# Patient Record
Sex: FEMALE | Race: WHITE | NOT HISPANIC OR LATINO
[De-identification: names, ages, dates, MRNs, and addresses within clinical notes are randomized per-mention and may not be internally consistent; named-entity substitution may affect disease eponyms.]

---

## 2017-01-17 ENCOUNTER — APPOINTMENT (OUTPATIENT)
Dept: GASTROENTEROLOGY | Facility: CLINIC | Age: 72
End: 2017-01-17

## 2017-01-17 VITALS
BODY MASS INDEX: 27.82 KG/M2 | RESPIRATION RATE: 16 BRPM | WEIGHT: 157 LBS | OXYGEN SATURATION: 95 % | SYSTOLIC BLOOD PRESSURE: 150 MMHG | TEMPERATURE: 98.5 F | HEIGHT: 63 IN | DIASTOLIC BLOOD PRESSURE: 68 MMHG | HEART RATE: 75 BPM

## 2017-03-12 ENCOUNTER — RESULT REVIEW (OUTPATIENT)
Age: 72
End: 2017-03-12

## 2017-03-13 ENCOUNTER — OUTPATIENT (OUTPATIENT)
Dept: OUTPATIENT SERVICES | Facility: HOSPITAL | Age: 72
LOS: 1 days | End: 2017-03-13
Payer: MEDICARE

## 2017-03-13 ENCOUNTER — APPOINTMENT (OUTPATIENT)
Dept: GASTROENTEROLOGY | Facility: HOSPITAL | Age: 72
End: 2017-03-13

## 2017-03-13 DIAGNOSIS — K21.9 GASTRO-ESOPHAGEAL REFLUX DISEASE WITHOUT ESOPHAGITIS: ICD-10-CM

## 2017-03-13 DIAGNOSIS — Z90.49 ACQUIRED ABSENCE OF OTHER SPECIFIED PARTS OF DIGESTIVE TRACT: Chronic | ICD-10-CM

## 2017-03-13 PROCEDURE — 43239 EGD BIOPSY SINGLE/MULTIPLE: CPT

## 2017-03-15 LAB — SURGICAL PATHOLOGY STUDY: SIGNIFICANT CHANGE UP

## 2017-03-16 ENCOUNTER — MESSAGE (OUTPATIENT)
Age: 72
End: 2017-03-16

## 2017-04-19 ENCOUNTER — APPOINTMENT (OUTPATIENT)
Dept: PULMONOLOGY | Facility: CLINIC | Age: 72
End: 2017-04-19

## 2017-04-19 VITALS
WEIGHT: 157 LBS | RESPIRATION RATE: 19 BRPM | SYSTOLIC BLOOD PRESSURE: 160 MMHG | DIASTOLIC BLOOD PRESSURE: 90 MMHG | HEIGHT: 63 IN | OXYGEN SATURATION: 97 % | HEART RATE: 100 BPM | BODY MASS INDEX: 27.82 KG/M2

## 2017-04-19 RX ORDER — ALBUTEROL SULFATE 90 UG/1
108 (90 BASE) AEROSOL, METERED RESPIRATORY (INHALATION)
Qty: 1 | Refills: 1 | Status: ACTIVE | COMMUNITY
Start: 2017-04-19 | End: 1900-01-01

## 2017-04-28 RX ORDER — AZELASTINE HYDROCHLORIDE 137 UG/1
0.1 SPRAY, METERED NASAL
Qty: 30 | Refills: 0 | Status: COMPLETED | COMMUNITY
Start: 2016-10-25

## 2017-04-28 RX ORDER — CELECOXIB 200 MG/1
200 CAPSULE ORAL
Qty: 30 | Refills: 0 | Status: COMPLETED | COMMUNITY
Start: 2017-01-03

## 2017-04-28 RX ORDER — TIZANIDINE 4 MG/1
4 TABLET ORAL
Qty: 30 | Refills: 0 | Status: COMPLETED | COMMUNITY
Start: 2017-01-03

## 2017-04-28 RX ORDER — TRAMADOL HYDROCHLORIDE 50 MG/1
50 TABLET, COATED ORAL
Qty: 30 | Refills: 0 | Status: COMPLETED | COMMUNITY
Start: 2016-11-23

## 2017-04-30 ENCOUNTER — FORM ENCOUNTER (OUTPATIENT)
Age: 72
End: 2017-04-30

## 2017-05-01 ENCOUNTER — LABORATORY RESULT (OUTPATIENT)
Age: 72
End: 2017-05-01

## 2017-05-01 ENCOUNTER — OUTPATIENT (OUTPATIENT)
Dept: OUTPATIENT SERVICES | Facility: HOSPITAL | Age: 72
LOS: 1 days | End: 2017-05-01
Payer: MEDICARE

## 2017-05-01 ENCOUNTER — APPOINTMENT (OUTPATIENT)
Dept: CT IMAGING | Facility: CLINIC | Age: 72
End: 2017-05-01

## 2017-05-01 DIAGNOSIS — R05 COUGH: ICD-10-CM

## 2017-05-01 DIAGNOSIS — R06.2 WHEEZING: ICD-10-CM

## 2017-05-01 DIAGNOSIS — Z90.49 ACQUIRED ABSENCE OF OTHER SPECIFIED PARTS OF DIGESTIVE TRACT: Chronic | ICD-10-CM

## 2017-05-01 PROCEDURE — 71250 CT THORAX DX C-: CPT

## 2017-05-02 ENCOUNTER — APPOINTMENT (OUTPATIENT)
Dept: PULMONOLOGY | Facility: CLINIC | Age: 72
End: 2017-05-02

## 2017-05-02 VITALS
SYSTOLIC BLOOD PRESSURE: 140 MMHG | DIASTOLIC BLOOD PRESSURE: 78 MMHG | HEART RATE: 107 BPM | BODY MASS INDEX: 27.46 KG/M2 | RESPIRATION RATE: 17 BRPM | OXYGEN SATURATION: 95 % | HEIGHT: 63 IN | WEIGHT: 155 LBS

## 2017-05-02 DIAGNOSIS — Z82.62 FAMILY HISTORY OF OSTEOPOROSIS: ICD-10-CM

## 2017-05-02 DIAGNOSIS — Z84.1 FAMILY HISTORY OF DISORDERS OF KIDNEY AND URETER: ICD-10-CM

## 2017-05-02 LAB
BASOPHILS # BLD AUTO: 0.02 K/UL
BASOPHILS NFR BLD AUTO: 0.3 %
EOSINOPHIL # BLD AUTO: 0.01 K/UL
EOSINOPHIL NFR BLD AUTO: 0.1 %
HCT VFR BLD CALC: 39.6 %
HGB BLD-MCNC: 13.8 G/DL
HIV1+2 AB SPEC QL IA.RAPID: NONREACTIVE
HIVABINT: NORMAL
IMM GRANULOCYTES NFR BLD AUTO: 0.3 %
LYMPHOCYTES # BLD AUTO: 0.88 K/UL
LYMPHOCYTES NFR BLD AUTO: 11.8 %
MAN DIFF?: NORMAL
MCHC RBC-ENTMCNC: 33.1 PG
MCHC RBC-ENTMCNC: 34.8 GM/DL
MCV RBC AUTO: 95 FL
MONOCYTES # BLD AUTO: 0.21 K/UL
MONOCYTES NFR BLD AUTO: 2.8 %
NEUTROPHILS # BLD AUTO: 6.33 K/UL
NEUTROPHILS NFR BLD AUTO: 84.7 %
NT-PROBNP SERPL-MCNC: 143 PG/ML
PLATELET # BLD AUTO: 274 K/UL
RBC # BLD: 4.17 M/UL
RBC # FLD: 15.1 %
WBC # FLD AUTO: 7.47 K/UL

## 2017-05-02 RX ORDER — LEVOFLOXACIN 500 MG/1
500 TABLET, FILM COATED ORAL DAILY
Qty: 10 | Refills: 0 | Status: DISCONTINUED | COMMUNITY
Start: 2017-04-19 | End: 2017-05-02

## 2017-05-03 LAB
B PERT IGG SER-ACNC: <0.95 INDEX
B PERT IGG SER-ACNC: <0.95 INDEX
B PERT IGM SER-ACNC: <1 INDEX
B PERT IGM SER-ACNC: <1 INDEX

## 2017-05-04 LAB
CHLAMYDIA PNEUMONIAE AB IGA: NORMAL TITER
CHLAMYDIA PNEUMONIAE AB IGG: NORMAL TITER
CHLAMYDIA PNEUMONIAE AB IGM: NORMAL TITER
M PNEUMO IGG SER IA-ACNC: POSITIVE
M PNEUMO IGG SER QL IA: 2.8 INDEX
M PNEUMO IGM SER QL IA: 1577 UNITS/ML
MYCOPLASMA AG SPEC QL: POSITIVE

## 2017-05-08 LAB
A FLAVUS AB FLD QL: NEGATIVE
A FUMIGATUS AB FLD QL: NEGATIVE
A NIGER AB FLD QL: NEGATIVE
ASPERGILLUS FLAVUS PRECIPITINS: NEGATIVE
ASPERGILLUS FUMIGATES PRECIPTINS: NEGATIVE
ASPERGILLUS NIGER PRECIPITINS: NEGATIVE

## 2017-05-18 ENCOUNTER — APPOINTMENT (OUTPATIENT)
Dept: THORACIC SURGERY | Facility: CLINIC | Age: 72
End: 2017-05-18

## 2017-05-30 ENCOUNTER — APPOINTMENT (OUTPATIENT)
Dept: PULMONOLOGY | Facility: CLINIC | Age: 72
End: 2017-05-30

## 2017-05-30 VITALS
BODY MASS INDEX: 28.35 KG/M2 | RESPIRATION RATE: 17 BRPM | DIASTOLIC BLOOD PRESSURE: 80 MMHG | WEIGHT: 160 LBS | HEART RATE: 79 BPM | HEIGHT: 63 IN | SYSTOLIC BLOOD PRESSURE: 120 MMHG | OXYGEN SATURATION: 96 %

## 2017-05-30 RX ORDER — BENZONATATE 200 MG/1
200 CAPSULE ORAL
Qty: 90 | Refills: 1 | Status: DISCONTINUED | COMMUNITY
Start: 2017-04-19 | End: 2017-05-30

## 2017-05-30 RX ORDER — BUDESONIDE 0.5 MG/2ML
0.5 INHALANT ORAL
Qty: 60 | Refills: 5 | Status: DISCONTINUED | COMMUNITY
Start: 2017-05-02 | End: 2017-05-30

## 2017-05-30 RX ORDER — HYDROCODONE BITARTRATE AND HOMATROPINE METHYLBROMIDE 5; 1.5 MG/5ML; MG/5ML
5-1.5 SYRUP ORAL
Qty: 240 | Refills: 0 | Status: DISCONTINUED | COMMUNITY
Start: 2017-04-19 | End: 2017-05-30

## 2017-05-30 RX ORDER — LEVALBUTEROL HYDROCHLORIDE 1.25 MG/3ML
1.25 SOLUTION RESPIRATORY (INHALATION)
Qty: 90 | Refills: 2 | Status: DISCONTINUED | COMMUNITY
Start: 2017-05-02 | End: 2017-05-30

## 2017-05-30 RX ORDER — PREDNISONE 10 MG/1
10 TABLET ORAL
Qty: 100 | Refills: 0 | Status: DISCONTINUED | COMMUNITY
Start: 2017-05-02 | End: 2017-05-30

## 2017-06-20 ENCOUNTER — APPOINTMENT (OUTPATIENT)
Dept: PULMONOLOGY | Facility: CLINIC | Age: 72
End: 2017-06-20

## 2017-06-20 VITALS
HEART RATE: 76 BPM | SYSTOLIC BLOOD PRESSURE: 130 MMHG | DIASTOLIC BLOOD PRESSURE: 80 MMHG | RESPIRATION RATE: 17 BRPM | OXYGEN SATURATION: 99 % | HEIGHT: 63 IN | BODY MASS INDEX: 28.35 KG/M2 | WEIGHT: 160 LBS

## 2017-07-11 ENCOUNTER — APPOINTMENT (OUTPATIENT)
Dept: PULMONOLOGY | Facility: CLINIC | Age: 72
End: 2017-07-11

## 2017-07-11 VITALS
RESPIRATION RATE: 16 BRPM | WEIGHT: 160 LBS | OXYGEN SATURATION: 95 % | HEIGHT: 63 IN | HEART RATE: 84 BPM | SYSTOLIC BLOOD PRESSURE: 135 MMHG | BODY MASS INDEX: 28.35 KG/M2 | DIASTOLIC BLOOD PRESSURE: 60 MMHG

## 2017-07-11 DIAGNOSIS — Z87.01 PERSONAL HISTORY OF PNEUMONIA (RECURRENT): ICD-10-CM

## 2017-07-11 RX ORDER — AMOXICILLIN AND CLAVULANATE POTASSIUM 875; 125 MG/1; MG/1
875-125 TABLET, COATED ORAL
Qty: 20 | Refills: 0 | Status: DISCONTINUED | COMMUNITY
Start: 2017-04-06

## 2017-07-11 RX ORDER — AZITHROMYCIN 250 MG/1
250 TABLET, FILM COATED ORAL
Qty: 27 | Refills: 0 | Status: DISCONTINUED | COMMUNITY
Start: 2017-05-04 | End: 2017-07-11

## 2017-07-13 ENCOUNTER — APPOINTMENT (OUTPATIENT)
Dept: THORACIC SURGERY | Facility: CLINIC | Age: 72
End: 2017-07-13
Payer: MEDICARE

## 2017-07-13 ENCOUNTER — OUTPATIENT (OUTPATIENT)
Dept: OUTPATIENT SERVICES | Facility: HOSPITAL | Age: 72
LOS: 1 days | End: 2017-07-13
Payer: MEDICARE

## 2017-07-13 VITALS
BODY MASS INDEX: 27.82 KG/M2 | WEIGHT: 157 LBS | OXYGEN SATURATION: 98 % | DIASTOLIC BLOOD PRESSURE: 78 MMHG | HEART RATE: 70 BPM | RESPIRATION RATE: 18 BRPM | TEMPERATURE: 98 F | SYSTOLIC BLOOD PRESSURE: 155 MMHG | HEIGHT: 63 IN

## 2017-07-13 DIAGNOSIS — Z87.898 PERSONAL HISTORY OF OTHER SPECIFIED CONDITIONS: ICD-10-CM

## 2017-07-13 DIAGNOSIS — J39.8 OTHER SPECIFIED DISEASES OF UPPER RESPIRATORY TRACT: ICD-10-CM

## 2017-07-13 DIAGNOSIS — Z09 ENCOUNTER FOR FOLLOW-UP EXAMINATION AFTER COMPLETED TREATMENT FOR CONDITIONS OTHER THAN MALIGNANT NEOPLASM: ICD-10-CM

## 2017-07-13 DIAGNOSIS — R53.83 OTHER MALAISE: ICD-10-CM

## 2017-07-13 DIAGNOSIS — R53.81 OTHER MALAISE: ICD-10-CM

## 2017-07-13 DIAGNOSIS — Z90.49 ACQUIRED ABSENCE OF OTHER SPECIFIED PARTS OF DIGESTIVE TRACT: Chronic | ICD-10-CM
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
## 2017-07-13 DIAGNOSIS — K52.831 COLLAGENOUS COLITIS: ICD-10-CM

## 2017-07-13 DIAGNOSIS — R10.31 RIGHT LOWER QUADRANT PAIN: ICD-10-CM

## 2017-07-13 LAB
ALBUMIN SERPL ELPH-MCNC: 4.8 G/DL — SIGNIFICANT CHANGE UP (ref 3.3–5)
ALP SERPL-CCNC: 57 U/L — SIGNIFICANT CHANGE UP (ref 40–120)
ALT FLD-CCNC: 12 U/L — SIGNIFICANT CHANGE UP (ref 10–45)
ANION GAP SERPL CALC-SCNC: 15 MMOL/L — SIGNIFICANT CHANGE UP (ref 5–17)
APPEARANCE UR: CLEAR — SIGNIFICANT CHANGE UP
APTT BLD: 28.7 SEC — SIGNIFICANT CHANGE UP (ref 27.5–37.4)
AST SERPL-CCNC: 20 U/L — SIGNIFICANT CHANGE UP (ref 10–40)
BASOPHILS NFR BLD AUTO: 0.1 % — SIGNIFICANT CHANGE UP (ref 0–2)
BILIRUB SERPL-MCNC: 0.5 MG/DL — SIGNIFICANT CHANGE UP (ref 0.2–1.2)
BILIRUB UR-MCNC: NEGATIVE — SIGNIFICANT CHANGE UP
BLD GP AB SCN SERPL QL: NEGATIVE — SIGNIFICANT CHANGE UP
BLD GP AB SCN SERPL QL: NEGATIVE — SIGNIFICANT CHANGE UP
BUN SERPL-MCNC: 17 MG/DL — SIGNIFICANT CHANGE UP (ref 7–23)
CALCIUM SERPL-MCNC: 9.7 MG/DL — SIGNIFICANT CHANGE UP (ref 8.4–10.5)
CHLORIDE SERPL-SCNC: 102 MMOL/L — SIGNIFICANT CHANGE UP (ref 96–108)
CHOLEST SERPL-MCNC: 257 MG/DL — HIGH (ref 10–199)
CO2 SERPL-SCNC: 24 MMOL/L — SIGNIFICANT CHANGE UP (ref 22–31)
COLOR SPEC: YELLOW — SIGNIFICANT CHANGE UP
CREAT SERPL-MCNC: 1 MG/DL — SIGNIFICANT CHANGE UP (ref 0.5–1.3)
DIFF PNL FLD: NEGATIVE — SIGNIFICANT CHANGE UP
EOSINOPHIL NFR BLD AUTO: 1.7 % — SIGNIFICANT CHANGE UP (ref 0–6)
GLUCOSE SERPL-MCNC: 100 MG/DL — HIGH (ref 70–99)
GLUCOSE UR QL: NEGATIVE — SIGNIFICANT CHANGE UP
HBA1C BLD-MCNC: 5.7 % — HIGH (ref 4–5.6)
HBV SURFACE AG SER-ACNC: SIGNIFICANT CHANGE UP
HCT VFR BLD CALC: 41.9 % — SIGNIFICANT CHANGE UP (ref 34.5–45)
HDLC SERPL-MCNC: 101 MG/DL — SIGNIFICANT CHANGE UP (ref 40–125)
HGB BLD-MCNC: 14.1 G/DL — SIGNIFICANT CHANGE UP (ref 11.5–15.5)
INR BLD: 1.04 — SIGNIFICANT CHANGE UP (ref 0.88–1.16)
KETONES UR-MCNC: (no result) MG/DL
LEUKOCYTE ESTERASE UR-ACNC: NEGATIVE — SIGNIFICANT CHANGE UP
LIPID PNL WITH DIRECT LDL SERPL: 145 MG/DL — HIGH
LYMPHOCYTES # BLD AUTO: 34.8 % — SIGNIFICANT CHANGE UP (ref 13–44)
MCHC RBC-ENTMCNC: 31.1 PG — SIGNIFICANT CHANGE UP (ref 27–34)
MCHC RBC-ENTMCNC: 33.7 G/DL — SIGNIFICANT CHANGE UP (ref 32–36)
MCV RBC AUTO: 92.5 FL — SIGNIFICANT CHANGE UP (ref 80–100)
MONOCYTES NFR BLD AUTO: 8.4 % — SIGNIFICANT CHANGE UP (ref 2–14)
NEUTROPHILS NFR BLD AUTO: 55 % — SIGNIFICANT CHANGE UP (ref 43–77)
NITRITE UR-MCNC: NEGATIVE — SIGNIFICANT CHANGE UP
PH UR: 6 — SIGNIFICANT CHANGE UP (ref 5–8)
PLATELET # BLD AUTO: 302 K/UL — SIGNIFICANT CHANGE UP (ref 150–400)
POTASSIUM SERPL-MCNC: 4.4 MMOL/L — SIGNIFICANT CHANGE UP (ref 3.5–5.3)
POTASSIUM SERPL-SCNC: 4.4 MMOL/L — SIGNIFICANT CHANGE UP (ref 3.5–5.3)
PROT SERPL-MCNC: 7.7 G/DL — SIGNIFICANT CHANGE UP (ref 6–8.3)
PROT UR-MCNC: NEGATIVE MG/DL — SIGNIFICANT CHANGE UP
PROTHROM AB SERPL-ACNC: 11.5 SEC — SIGNIFICANT CHANGE UP (ref 9.8–12.7)
RBC # BLD: 4.53 M/UL — SIGNIFICANT CHANGE UP (ref 3.8–5.2)
RBC # FLD: 13.5 % — SIGNIFICANT CHANGE UP (ref 10.3–16.9)
RH IG SCN BLD-IMP: POSITIVE — SIGNIFICANT CHANGE UP
RH IG SCN BLD-IMP: POSITIVE — SIGNIFICANT CHANGE UP
SODIUM SERPL-SCNC: 141 MMOL/L — SIGNIFICANT CHANGE UP (ref 135–145)
SP GR SPEC: 1.02 — SIGNIFICANT CHANGE UP (ref 1–1.03)
TOTAL CHOLESTEROL/HDL RATIO MEASUREMENT: 2.5 RATIO — LOW (ref 3.3–7.1)
TRIGL SERPL-MCNC: 57 MG/DL — SIGNIFICANT CHANGE UP (ref 10–149)
TSH SERPL-MCNC: 0.78 UIU/ML — SIGNIFICANT CHANGE UP (ref 0.35–4.94)
UROBILINOGEN FLD QL: 0.2 E.U./DL — SIGNIFICANT CHANGE UP
WBC # BLD: 7.3 K/UL — SIGNIFICANT CHANGE UP (ref 3.8–10.5)
WBC # FLD AUTO: 7.3 K/UL — SIGNIFICANT CHANGE UP (ref 3.8–10.5)

## 2017-07-13 PROCEDURE — 80061 LIPID PANEL: CPT

## 2017-07-13 PROCEDURE — 80053 COMPREHEN METABOLIC PANEL: CPT

## 2017-07-13 PROCEDURE — 84443 ASSAY THYROID STIM HORMONE: CPT

## 2017-07-13 PROCEDURE — 99205 OFFICE O/P NEW HI 60 MIN: CPT

## 2017-07-13 PROCEDURE — 85730 THROMBOPLASTIN TIME PARTIAL: CPT

## 2017-07-13 PROCEDURE — 85025 COMPLETE CBC W/AUTO DIFF WBC: CPT

## 2017-07-13 PROCEDURE — 85610 PROTHROMBIN TIME: CPT

## 2017-07-13 PROCEDURE — 81003 URINALYSIS AUTO W/O SCOPE: CPT

## 2017-07-13 PROCEDURE — 93010 ELECTROCARDIOGRAM REPORT: CPT

## 2017-07-13 PROCEDURE — 87340 HEPATITIS B SURFACE AG IA: CPT

## 2017-07-13 PROCEDURE — 93005 ELECTROCARDIOGRAM TRACING: CPT

## 2017-07-13 PROCEDURE — 86850 RBC ANTIBODY SCREEN: CPT

## 2017-07-13 PROCEDURE — 86900 BLOOD TYPING SEROLOGIC ABO: CPT

## 2017-07-13 PROCEDURE — 86901 BLOOD TYPING SEROLOGIC RH(D): CPT

## 2017-07-13 PROCEDURE — 83036 HEMOGLOBIN GLYCOSYLATED A1C: CPT

## 2017-07-24 ENCOUNTER — FORM ENCOUNTER (OUTPATIENT)
Age: 72
End: 2017-07-24

## 2017-07-25 ENCOUNTER — APPOINTMENT (OUTPATIENT)
Dept: THORACIC SURGERY | Facility: HOSPITAL | Age: 72
End: 2017-07-25

## 2017-07-25 ENCOUNTER — OUTPATIENT (OUTPATIENT)
Dept: OUTPATIENT SERVICES | Facility: HOSPITAL | Age: 72
LOS: 1 days | Discharge: HOME | End: 2017-07-25
Payer: MEDICARE

## 2017-07-25 VITALS — WEIGHT: 153.44 LBS | OXYGEN SATURATION: 99 % | RESPIRATION RATE: 18 BRPM | HEIGHT: 63.5 IN

## 2017-07-25 VITALS
DIASTOLIC BLOOD PRESSURE: 98 MMHG | OXYGEN SATURATION: 100 % | SYSTOLIC BLOOD PRESSURE: 166 MMHG | HEART RATE: 72 BPM | RESPIRATION RATE: 15 BRPM

## 2017-07-25 DIAGNOSIS — J98.19 OTHER PULMONARY COLLAPSE: ICD-10-CM

## 2017-07-25 DIAGNOSIS — J39.8 OTHER SPECIFIED DISEASES OF UPPER RESPIRATORY TRACT: ICD-10-CM

## 2017-07-25 DIAGNOSIS — Z90.49 ACQUIRED ABSENCE OF OTHER SPECIFIED PARTS OF DIGESTIVE TRACT: Chronic | ICD-10-CM

## 2017-07-25 DIAGNOSIS — K21.9 GASTRO-ESOPHAGEAL REFLUX DISEASE WITHOUT ESOPHAGITIS: ICD-10-CM

## 2017-07-25 DIAGNOSIS — J98.09 OTHER DISEASES OF BRONCHUS, NOT ELSEWHERE CLASSIFIED: ICD-10-CM

## 2017-07-25 PROCEDURE — 31622 DX BRONCHOSCOPE/WASH: CPT

## 2017-07-25 PROCEDURE — 71045 X-RAY EXAM CHEST 1 VIEW: CPT

## 2017-07-25 PROCEDURE — 71010: CPT | Mod: 26

## 2017-07-25 PROCEDURE — 31624 DX BRONCHOSCOPE/LAVAGE: CPT

## 2017-07-25 RX ORDER — LISINOPRIL 2.5 MG/1
5 TABLET ORAL DAILY
Qty: 0 | Refills: 0 | Status: DISCONTINUED | OUTPATIENT
Start: 2017-07-25 | End: 2017-07-25

## 2017-07-25 RX ADMIN — LISINOPRIL 5 MILLIGRAM(S): 2.5 TABLET ORAL at 13:37

## 2017-07-25 NOTE — BRIEF OPERATIVE NOTE - PROCEDURE
Flexible bronchoscopy  07/25/2017    Active  TAMY Flexible bronchoscopy  07/25/2017    Active  Dwayne Whitmore

## 2017-07-25 NOTE — BRIEF OPERATIVE NOTE - OPERATION/FINDINGS
Left bronchus with 100% collapse  Right bronchus with 90% collapse  Distal Trachea with 70% collapse

## 2017-08-08 ENCOUNTER — APPOINTMENT (OUTPATIENT)
Dept: PULMONOLOGY | Facility: CLINIC | Age: 72
End: 2017-08-08
Payer: MEDICARE

## 2017-08-08 VITALS
SYSTOLIC BLOOD PRESSURE: 125 MMHG | DIASTOLIC BLOOD PRESSURE: 80 MMHG | WEIGHT: 157 LBS | BODY MASS INDEX: 26.8 KG/M2 | HEIGHT: 64 IN | RESPIRATION RATE: 15 BRPM | HEART RATE: 75 BPM | OXYGEN SATURATION: 97 %

## 2017-08-08 PROCEDURE — 99214 OFFICE O/P EST MOD 30 MIN: CPT | Mod: 25

## 2017-08-08 PROCEDURE — 94010 BREATHING CAPACITY TEST: CPT

## 2017-08-10 ENCOUNTER — APPOINTMENT (OUTPATIENT)
Dept: THORACIC SURGERY | Facility: CLINIC | Age: 72
End: 2017-08-10
Payer: MEDICARE

## 2017-08-10 VITALS
SYSTOLIC BLOOD PRESSURE: 156 MMHG | TEMPERATURE: 98 F | OXYGEN SATURATION: 97 % | BODY MASS INDEX: 28.32 KG/M2 | HEART RATE: 82 BPM | DIASTOLIC BLOOD PRESSURE: 90 MMHG | WEIGHT: 165 LBS | RESPIRATION RATE: 18 BRPM

## 2017-08-10 PROCEDURE — 99214 OFFICE O/P EST MOD 30 MIN: CPT

## 2017-08-22 ENCOUNTER — APPOINTMENT (OUTPATIENT)
Dept: PULMONOLOGY | Facility: CLINIC | Age: 72
End: 2017-08-22
Payer: MEDICARE

## 2017-08-22 VITALS
HEART RATE: 83 BPM | BODY MASS INDEX: 28.17 KG/M2 | WEIGHT: 165 LBS | HEIGHT: 64 IN | OXYGEN SATURATION: 97 % | SYSTOLIC BLOOD PRESSURE: 120 MMHG | DIASTOLIC BLOOD PRESSURE: 70 MMHG | RESPIRATION RATE: 17 BRPM

## 2017-08-22 PROCEDURE — 94010 BREATHING CAPACITY TEST: CPT

## 2017-08-22 PROCEDURE — 99214 OFFICE O/P EST MOD 30 MIN: CPT | Mod: 25

## 2017-09-10 ENCOUNTER — RX RENEWAL (OUTPATIENT)
Age: 72
End: 2017-09-10

## 2017-09-28 ENCOUNTER — APPOINTMENT (OUTPATIENT)
Dept: SLEEP CENTER | Facility: CLINIC | Age: 72
End: 2017-09-28
Payer: MEDICARE

## 2017-09-28 ENCOUNTER — OUTPATIENT (OUTPATIENT)
Dept: OUTPATIENT SERVICES | Facility: HOSPITAL | Age: 72
LOS: 1 days | End: 2017-09-28
Payer: MEDICARE

## 2017-09-28 DIAGNOSIS — Z90.49 ACQUIRED ABSENCE OF OTHER SPECIFIED PARTS OF DIGESTIVE TRACT: Chronic | ICD-10-CM

## 2017-09-28 PROCEDURE — G0400: CPT | Mod: 26

## 2017-09-28 PROCEDURE — G0400: CPT

## 2017-10-02 ENCOUNTER — APPOINTMENT (OUTPATIENT)
Dept: PULMONOLOGY | Facility: CLINIC | Age: 72
End: 2017-10-02
Payer: MEDICARE

## 2017-10-02 VITALS
BODY MASS INDEX: 26.22 KG/M2 | SYSTOLIC BLOOD PRESSURE: 154 MMHG | RESPIRATION RATE: 17 BRPM | WEIGHT: 148 LBS | HEART RATE: 79 BPM | OXYGEN SATURATION: 97 % | HEIGHT: 63 IN | DIASTOLIC BLOOD PRESSURE: 86 MMHG

## 2017-10-02 PROCEDURE — 99214 OFFICE O/P EST MOD 30 MIN: CPT | Mod: 25

## 2017-10-02 PROCEDURE — 71020: CPT

## 2017-10-06 ENCOUNTER — APPOINTMENT (OUTPATIENT)
Dept: THORACIC SURGERY | Facility: CLINIC | Age: 72
End: 2017-10-06
Payer: MEDICARE

## 2017-10-06 DIAGNOSIS — I10 ESSENTIAL (PRIMARY) HYPERTENSION: ICD-10-CM

## 2017-10-06 DIAGNOSIS — G47.33 OBSTRUCTIVE SLEEP APNEA (ADULT) (PEDIATRIC): ICD-10-CM

## 2017-10-06 PROCEDURE — 99215 OFFICE O/P EST HI 40 MIN: CPT

## 2017-10-08 ENCOUNTER — EMERGENCY (EMERGENCY)
Facility: HOSPITAL | Age: 72
LOS: 1 days | Discharge: HOME | End: 2017-10-08
Attending: EMERGENCY MEDICINE | Admitting: EMERGENCY MEDICINE
Payer: MEDICARE

## 2017-10-08 VITALS
SYSTOLIC BLOOD PRESSURE: 163 MMHG | RESPIRATION RATE: 18 BRPM | DIASTOLIC BLOOD PRESSURE: 79 MMHG | TEMPERATURE: 98 F | OXYGEN SATURATION: 95 % | HEART RATE: 102 BPM

## 2017-10-08 VITALS
SYSTOLIC BLOOD PRESSURE: 135 MMHG | OXYGEN SATURATION: 94 % | TEMPERATURE: 98 F | DIASTOLIC BLOOD PRESSURE: 76 MMHG | RESPIRATION RATE: 20 BRPM | HEART RATE: 102 BPM | WEIGHT: 147.05 LBS | HEIGHT: 63 IN

## 2017-10-08 DIAGNOSIS — Z90.49 ACQUIRED ABSENCE OF OTHER SPECIFIED PARTS OF DIGESTIVE TRACT: Chronic | ICD-10-CM

## 2017-10-08 LAB
ANION GAP SERPL CALC-SCNC: 19 MMOL/L — HIGH (ref 5–17)
BASOPHILS # BLD AUTO: 0 K/UL — SIGNIFICANT CHANGE UP (ref 0–0.2)
BASOPHILS NFR BLD AUTO: 0.3 % — SIGNIFICANT CHANGE UP (ref 0–2)
BUN SERPL-MCNC: 13 MG/DL — SIGNIFICANT CHANGE UP (ref 7–23)
CALCIUM SERPL-MCNC: 9.5 MG/DL — SIGNIFICANT CHANGE UP (ref 8.4–10.5)
CHLORIDE SERPL-SCNC: 100 MMOL/L — SIGNIFICANT CHANGE UP (ref 96–108)
CO2 SERPL-SCNC: 21 MMOL/L — LOW (ref 22–31)
CREAT SERPL-MCNC: 0.88 MG/DL — SIGNIFICANT CHANGE UP (ref 0.5–1.3)
EOSINOPHIL # BLD AUTO: 1 K/UL — HIGH (ref 0–0.5)
EOSINOPHIL NFR BLD AUTO: 6.7 % — HIGH (ref 0–6)
GLUCOSE SERPL-MCNC: 61 MG/DL — LOW (ref 70–99)
HCT VFR BLD CALC: 41.2 % — SIGNIFICANT CHANGE UP (ref 34.5–45)
HGB BLD-MCNC: 13.7 G/DL — SIGNIFICANT CHANGE UP (ref 11.5–15.5)
LYMPHOCYTES # BLD AUTO: 1.5 K/UL — SIGNIFICANT CHANGE UP (ref 1–3.3)
LYMPHOCYTES # BLD AUTO: 10.6 % — LOW (ref 13–44)
MCHC RBC-ENTMCNC: 32.9 PG — SIGNIFICANT CHANGE UP (ref 27–34)
MCHC RBC-ENTMCNC: 33.2 GM/DL — SIGNIFICANT CHANGE UP (ref 32–36)
MCV RBC AUTO: 99.2 FL — SIGNIFICANT CHANGE UP (ref 80–100)
MONOCYTES # BLD AUTO: 1.4 K/UL — HIGH (ref 0–0.9)
MONOCYTES NFR BLD AUTO: 10.3 % — SIGNIFICANT CHANGE UP (ref 2–14)
NEUTROPHILS # BLD AUTO: 10.2 K/UL — HIGH (ref 1.8–7.4)
NEUTROPHILS NFR BLD AUTO: 72.1 % — SIGNIFICANT CHANGE UP (ref 43–77)
PLATELET # BLD AUTO: 310 K/UL — SIGNIFICANT CHANGE UP (ref 150–400)
POTASSIUM SERPL-MCNC: 4.5 MMOL/L — SIGNIFICANT CHANGE UP (ref 3.5–5.3)
POTASSIUM SERPL-SCNC: 4.5 MMOL/L — SIGNIFICANT CHANGE UP (ref 3.5–5.3)
RAPID RVP RESULT: SIGNIFICANT CHANGE UP
RBC # BLD: 4.15 M/UL — SIGNIFICANT CHANGE UP (ref 3.8–5.2)
RBC # FLD: 12.9 % — SIGNIFICANT CHANGE UP (ref 10.3–14.5)
SODIUM SERPL-SCNC: 140 MMOL/L — SIGNIFICANT CHANGE UP (ref 135–145)
WBC # BLD: 14.1 K/UL — HIGH (ref 3.8–10.5)
WBC # FLD AUTO: 14.1 K/UL — HIGH (ref 3.8–10.5)

## 2017-10-08 PROCEDURE — 96375 TX/PRO/DX INJ NEW DRUG ADDON: CPT

## 2017-10-08 PROCEDURE — 87581 M.PNEUMON DNA AMP PROBE: CPT

## 2017-10-08 PROCEDURE — 71046 X-RAY EXAM CHEST 2 VIEWS: CPT

## 2017-10-08 PROCEDURE — 87486 CHLMYD PNEUM DNA AMP PROBE: CPT

## 2017-10-08 PROCEDURE — 96374 THER/PROPH/DIAG INJ IV PUSH: CPT

## 2017-10-08 PROCEDURE — 71020: CPT | Mod: 26

## 2017-10-08 PROCEDURE — 85027 COMPLETE CBC AUTOMATED: CPT

## 2017-10-08 PROCEDURE — 99284 EMERGENCY DEPT VISIT MOD MDM: CPT | Mod: 25

## 2017-10-08 PROCEDURE — 87633 RESP VIRUS 12-25 TARGETS: CPT

## 2017-10-08 PROCEDURE — 87798 DETECT AGENT NOS DNA AMP: CPT

## 2017-10-08 PROCEDURE — 99284 EMERGENCY DEPT VISIT MOD MDM: CPT

## 2017-10-08 PROCEDURE — 80048 BASIC METABOLIC PNL TOTAL CA: CPT

## 2017-10-08 RX ORDER — AZITHROMYCIN 500 MG/1
500 TABLET, FILM COATED ORAL ONCE
Qty: 0 | Refills: 0 | Status: COMPLETED | OUTPATIENT
Start: 2017-10-08 | End: 2017-10-08

## 2017-10-08 RX ORDER — AZITHROMYCIN 500 MG/1
1 TABLET, FILM COATED ORAL
Qty: 4 | Refills: 0 | OUTPATIENT
Start: 2017-10-08 | End: 2017-10-12

## 2017-10-08 RX ORDER — ONDANSETRON 8 MG/1
1 TABLET, FILM COATED ORAL
Qty: 9 | Refills: 0 | OUTPATIENT
Start: 2017-10-08 | End: 2017-10-11

## 2017-10-08 RX ORDER — SODIUM CHLORIDE 9 MG/ML
1000 INJECTION INTRAMUSCULAR; INTRAVENOUS; SUBCUTANEOUS ONCE
Qty: 0 | Refills: 0 | Status: COMPLETED | OUTPATIENT
Start: 2017-10-08 | End: 2017-10-08

## 2017-10-08 RX ORDER — CEFUROXIME AXETIL 250 MG
1 TABLET ORAL
Qty: 18 | Refills: 0 | OUTPATIENT
Start: 2017-10-08 | End: 2017-10-17

## 2017-10-08 RX ORDER — ACETAMINOPHEN 500 MG
975 TABLET ORAL ONCE
Qty: 0 | Refills: 0 | Status: COMPLETED | OUTPATIENT
Start: 2017-10-08 | End: 2017-10-08

## 2017-10-08 RX ORDER — ONDANSETRON 8 MG/1
4 TABLET, FILM COATED ORAL ONCE
Qty: 0 | Refills: 0 | Status: COMPLETED | OUTPATIENT
Start: 2017-10-08 | End: 2017-10-08

## 2017-10-08 RX ORDER — FAMOTIDINE 10 MG/ML
20 INJECTION INTRAVENOUS ONCE
Qty: 0 | Refills: 0 | Status: COMPLETED | OUTPATIENT
Start: 2017-10-08 | End: 2017-10-08

## 2017-10-08 RX ADMIN — Medication 975 MILLIGRAM(S): at 11:55

## 2017-10-08 RX ADMIN — AZITHROMYCIN 500 MILLIGRAM(S): 500 TABLET, FILM COATED ORAL at 16:09

## 2017-10-08 RX ADMIN — Medication 60 MILLIGRAM(S): at 11:55

## 2017-10-08 RX ADMIN — Medication 30 MILLILITER(S): at 20:07

## 2017-10-08 RX ADMIN — ONDANSETRON 4 MILLIGRAM(S): 8 TABLET, FILM COATED ORAL at 20:03

## 2017-10-08 RX ADMIN — Medication 100 MILLIGRAM(S): at 11:55

## 2017-10-08 RX ADMIN — FAMOTIDINE 20 MILLIGRAM(S): 10 INJECTION INTRAVENOUS at 19:40

## 2017-10-08 RX ADMIN — SODIUM CHLORIDE 1000 MILLILITER(S): 9 INJECTION INTRAMUSCULAR; INTRAVENOUS; SUBCUTANEOUS at 12:00

## 2017-10-08 NOTE — ED PROVIDER NOTE - NS_ ATTENDINGSCRIBEDETAILS _ED_A_ED_FT
I, Warren Tabor, performed the initial face to face bedside interview with this patient regarding history of present illness, review of symptoms and relevant past medical, social and family history.  I completed an independent physical examination.  I was the initial provider who evaluated this patient. The history, relevant review of systems, past medical and surgical history, medical decision making, and physical examination was documented by the scribe in my presence and I attest to the accuracy of the documentation.

## 2017-10-08 NOTE — ED PROVIDER NOTE - PHYSICAL EXAMINATION
VITALS: reviewed  GEN: NAD, A & O x 4  HEAD/EYES: NCAT, PERRL, EOMI, anicteric sclerae, no conjunctival pallor  ENT: mild pharyngeal edema.  CHEST: diffuse crackles and wheezing throughout lungs  CV: heart with reg rhythm S1, S2, no murmur; distal pulses intact and symmetric bilaterally  ABDOMEN: normoactive bowel sounds, soft, nondistended, nontender, no masses  : no CVAT  MSK: extremities atraumatic and nontender, no edema. the back is without midline or lateral tenderness, there is no spinal deformity or stepoff and the back is ranged painlessly. the neck has no midline tenderness, deformity, or stepoff, and is ranged painlessly.  SKIN: warm, dry, no rash, no bruising, no cyanosis. color appropriate for ethnicity  NEURO: alert, mentating appropriately, no facial asymmetry. gross sensation, motor, coordination are intact  PSYCH: Affect appropriate VITALS: reviewed  GEN: NAD, A & O x 4  HEAD/EYES: NCAT, PERRL, EOMI, anicteric sclerae, no conjunctival pallor  ENT: mild pharyngeal edema, pharyngeal cobblestonining. mucus membranes are moist, neck supple, no sinus tenderness  CHEST: diffuse crackles and wheezing throughout lungs  CV: heart with reg rhythm S1, S2, no murmur; distal pulses intact and symmetric bilaterally  ABDOMEN: normoactive bowel sounds, soft, nondistended, nontender, no masses  : no CVAT  MSK: extremities atraumatic and nontender, no edema, no assymetry. the back is without midline or lateral tenderness, there is no spinal deformity or stepoff and the back is ranged painlessly. the neck has no midline tenderness, deformity, or stepoff, and is ranged painlessly.  SKIN: warm, dry, no rash, no bruising, no cyanosis. color appropriate for ethnicity  NEURO: alert, mentating appropriately, no facial asymmetry. gross sensation, motor, coordination are intact  PSYCH: Affect appropriate

## 2017-10-08 NOTE — ED CDU PROVIDER NOTE - MEDICAL DECISION MAKING DETAILS
ATTENDING MD Leander: Please see original ED provider note MDM and progress notes for full medical decision making leading to CDU stay.

## 2017-10-08 NOTE — CONSULT NOTE ADULT - ASSESSMENT
Ms. Phipps is a 72 year old woman with tracheobronchomalacia, GERD, mild SUSAN, presents with 4-5 d of worsening cough. Found to have focal inspiratory crackles with likely LLL infiltrate. Likely PNA.     Normal oxygenation. Walking around.     Recommend:   - broaden to levofloxacin 750 mg - total of 5 d   - was noted to be wheezing on presentation -- can get prednisone 20 mg x4 more d.   - she will f/u with Dr. Saunders in 1 wk for the resolution of her sx.   - should get a rpt CXR in 4-6 wks.   - c/w her home inhalers and nebulizers.     Yoni Young MD   Pulmonary Fellow

## 2017-10-08 NOTE — ED CDU PROVIDER NOTE - ATTENDING CONTRIBUTION TO CARE
ATTENDING MD: I have personally performed a face to face diagnostic evaluation on this patient.  I have reviewed the ACP note and agree with the history, exam, and plan of care, except as noted here. Progress notes and further evaluation to be reviewed by observation and discharging attending.    Charts made in real time. Please forgive typos.

## 2017-10-08 NOTE — ED ADULT NURSE NOTE - OBJECTIVE STATEMENT
71 y/o female c/o excessive, persistent  cough x 3-4 days, even with nebulizer treatment, "nonstop 24/7"  with shortness of breath, neck pain, and headache. Symptoms worsens when laying down.   Pt denies fever, chills,  pain, n/v/d, or any other complaints at this time.  (+) wheezing, HOB elevated.

## 2017-10-08 NOTE — ED PROVIDER NOTE - CARE PLAN
Principal Discharge DX:	Bronchitis  Instructions for follow-up, activity and diet:	You were seen and evaluated in the emergency department for cough. You improved with medication. You will take a short course of steroids. You should stop the augmentin and instead teak the azithromycin as prescribed. Use your nebulizers and inhalers as needed. Please follow up with your pulmonologist, Dr. Saunders, this week.    Please return if you develop pain, worsening trouble breathing, fevers, chills, weakness, or other concerning or worsening new symptoms.  Secondary Diagnosis:	Bronchomalacia, acquired Principal Discharge DX:	Bronchitis  Instructions for follow-up, activity and diet:	You were seen and evaluated in the emergency department for cough. You improved with medication. You will take a short course of steroids. You should stop the augmentin and instead take the levofloxacin as prescribed. Use your nebulizers and inhalers as needed. Please follow up with your pulmonologist, Dr. Saunders, this week.    Please return if you develop pain, worsening trouble breathing, fevers, chills, weakness, or other concerning or worsening new symptoms.  Secondary Diagnosis:	Bronchomalacia, acquired

## 2017-10-08 NOTE — ED ADULT NURSE REASSESSMENT NOTE - NS ED NURSE REASSESS COMMENT FT1
Addendum note:  As per Dr. Tabor , pt was discharged at 18:15pm under stable condition with instructions.  Pt verbalizes understanding,  IV lock removed.  At about 18:50pm, pt c/o nausea, states she's not feeling well.  Dr Tabor informed and MD spoke to pt.   Discharge held as per MD.  Report given to night nurse Najma Piña.

## 2017-10-08 NOTE — ED PROVIDER NOTE - PLAN OF CARE
You were seen and evaluated in the emergency department for cough. You improved with medication. You will take a short course of steroids. You should stop the augmentin and instead teak the azithromycin as prescribed. Use your nebulizers and inhalers as needed. Please follow up with your pulmonologist, Dr. Saunders, this week.    Please return if you develop pain, worsening trouble breathing, fevers, chills, weakness, or other concerning or worsening new symptoms. You were seen and evaluated in the emergency department for cough. You improved with medication. You will take a short course of steroids. You should stop the augmentin and instead take the levofloxacin as prescribed. Use your nebulizers and inhalers as needed. Please follow up with your pulmonologist, Dr. Saunders, this week.    Please return if you develop pain, worsening trouble breathing, fevers, chills, weakness, or other concerning or worsening new symptoms.

## 2017-10-08 NOTE — ED PROVIDER NOTE - MEDICAL DECISION MAKING DETAILS
72 y.o. F with persistent cough in bronchotracheal malesia, similar to previous episodes. Not responding to usual treatment, no red flag symptoms. Will obtain CXR, RBP to rule out infectious causes, give nebulizer, steroids and reevaluate. 72 y.o. F with persistent cough in bronchotracheomalacia, similar to previous episodes. Exam suggests mild dehydration, clear evidence of pharyngeal cobblestoning and irritation, clear clinical signs of bronchospasm. Insufficient responding to usual nebs, no red flag symptoms. No concern for VTE clinically .Will obtain CXR, RBP to rule out infectious causes, give nebulizer, steroids and reevaluate.

## 2017-10-08 NOTE — ED PROVIDER NOTE - OBJECTIVE STATEMENT
72 y.o. F with PMHx of HTN, colitis, ulcer, PSHx of appendectomy, presents to the ED today with complaints of excessive cough x3-4 days. Pt admits that cough is persistent even with nebulizer treatment, "nonstop 24/7" associated with shortness of breath, neck pain, and headache. Symptoms exacerbated when laying down. Denies pain, fever, chills, or any other complaints at this time.     Dr. Denzel Saunders (PMD/Pulm.) #[phone] 72 y.o. F with PMHx of HTN, colitis, ulcer, PSHx of appendectomy, presents to the ED today with complaints of excessive cough x3-4 days. Pt admits that cough is persistent even with nebulizer treatment, "nonstop 24/7" associated with shortness of breath, neck pain, and headache. Symptoms exacerbated when laying down. Denies pain, fever, chills, or any other complaints at this time.     Dr. Denzel Saunders (PMD/Pulm.) #[phone]  Pharmacy: [facility] [id number] [place] [address] #[phone] 72 y.o. F with PMHx of HTN, colitis, ulcer, PSHx of appendectomy, presents to the ED today with complaints of excessive cough x3-4 days. Pt admits that cough is persistent, mildly improvd with even with nebulizer treatment, "nonstop 24/7" associated with shortness of breath, neck pain, and headache. Symptoms exacerbated when laying down. Denies pain, fever, chills, or any other complaints at this time.     Dr. Denzel Saunders (PMD/Pulm.) #[phone]  Pharmacy: [facility] [id number] [place] [address] #[phone]

## 2017-10-08 NOTE — ED PROVIDER NOTE - PROGRESS NOTE DETAILS
called Dr. Saunders called Dr. Saunders's coverage, Dr. Rozina, agrees to treat with Z-melony, stop Augmentin. Agrees steroid burst. Recommends follow up with clinic pulmonary came by, feels she has pneumonia, recommends levofloxacin instead of Zithromax and discharge Pt feels very nauseated after levofloxain not tolerating PO, will place in CDU for obs until symptoms control. If taking PO and feel simproved no contraindication to DC Pt was offered observation in the CDU to make sure she could tolerate PO medications for her bronchitis, declined.  Feeling better now, tolerationg PO and wishes to go home.  Will give ceftin, azithromycin, and zofran if needed for nausea.  Return to ER if any concerns.  Mohsen called Dr. Saunders's coverage, Dr. Rozina, agrees to treat with Z-melony, stop Augmentin. Agrees steroid burst. Recommends follow up with clinic. Pt markedly improved after nebs and benzonatate, neg RVP, neg CXR pulmonary came by, feels she has clinical pneumonia despite, recommends levofloxacin instead of Zithromax and discharge Pt feels very nauseated after levofloxacin not tolerating PO, will place in CDU for obs until symptoms control. If taking PO and feels improved no contraindication to DC Pt was offered observation in the CDU to make sure she could tolerate PO medications for her bronchitis, declined.  Feeling better now, tolerationg PO and wishes to go home.  Will give ceftin, azithromycin, and zofran if needed for nausea.  Hold levofloxacin given nausea. Return to ER if any concerns.  ~ Mohsen

## 2017-10-08 NOTE — CONSULT NOTE ADULT - SUBJECTIVE AND OBJECTIVE BOX
CHIEF COMPLAINT: cough    HPI: Ms. Phipps is a 72 year old woman with tracheobronchomalacia, GERD, mild SUSAN, presents with 4-5 d of worsening cough. She has not been able to bring it up. Minimal post nasal drip. No fevers. Cough is worsening. No myalgias. She went to her pulmonologist, and was given Augmentin. She took it for 4-5 d without much relief. Decided to come to the ED. She is on inhalers and nebulizers at home, they are not helping.     in the ED, she was given IVF, prednisone 60 mg.     PAST MEDICAL & SURGICAL HISTORY:  Colitis  Ulcer  Hypertension  History of appendectomy      FAMILY HISTORY:  No pertinent family history in first degree relatives      SOCIAL HISTORY:  Smoking: none  EtOH Use: none  Lives alone    Allergies  Breo Ellipta (Other)  quinidine (Unknown)    HOME MEDICATIONS: reviewed    REVIEW OF SYSTEMS:  [x] All other systems negative  [ ] Unable to assess ROS because ________    OBJECTIVE:  ICU Vital Signs Last 24 Hrs  T(C): 36.6 (08 Oct 2017 15:43), Max: 36.7 (08 Oct 2017 11:05)  T(F): 97.9 (08 Oct 2017 15:43), Max: 98.1 (08 Oct 2017 11:05)  HR: 90 (08 Oct 2017 15:43) (90 - 111)  BP: 150/80 (08 Oct 2017 15:43) (135/76 - 150/80)  RR: 18 (08 Oct 2017 16:09) (18 - 20)  SpO2: 95% (08 Oct 2017 16:09) (94% - 96%)    PHYSICAL EXAM:  General: no respiratory distress; but continued cough.   HEENT: mild erythema in the posterior pharynx  Respiratory:  focal LLL inspiratory crackles. no significant wheezing.  Cardiovascular:  nl rate and reg rhythm. nl s1, s2  Abdomen: soft. nt. nd  Extremities: no edema  Neurological: AOx3    HOSPITAL MEDICATIONS:  MEDICATIONS  (STANDING):  levoFLOXacin  Tablet 750 milliGRAM(s) Oral every 24 hours    MEDICATIONS  (PRN):      LABS:    MICROBIOLOGY:   RVP: negative    RADIOLOGY:  CXR: likely LLL infiltrate

## 2017-10-08 NOTE — ED ADULT NURSE NOTE - HARM RISK FACTORS
Obstetrical Discharge Summary     Name: Stef Brownlee MRN: [id number]  SSN: [id number]    YOB: 1993  Age: 21 y.o. Sex: female      Admit Date: 2017    Discharge Date: 2017     Admitting Physician: Kade Townsend MD     Attending Physician:  Kade Townsend MD     Admission Diagnoses: Induction ; Active labor    Discharge Diagnoses:   Information for the patient's :  Peyton Reis, Female [[id number]]   Delivery of a 7 lb 7.6 oz (3.39 kg) female infant via Vaginal, Spontaneous Delivery on 2017 at 6:44 PM  by . Apgars were 9 and 9. Additional Diagnoses:   Hospital Problems  Never Reviewed          Codes Class Noted POA    Normal delivery ICD-10-CM: [de-identified], Z37.9  ICD-9-CM: 542  2017 Unknown             Lab Results   Component Value Date/Time    Rubella, External Non-immune 2016    GrBStrep, External Negative 2017       Immunization(s):   Immunization History   Administered Date(s) Administered    Influenza Vaccine (Quad) PF 2016    Tdap 2016        Hospital Course: Normal hospital course following the delivery. The patient was released to her home in good condition. Patient Instructions:     Reference my discharge instructions.       Follow-up Appointments   Procedures    FOLLOW UP VISIT Appointment in: 6 Weeks     Standing Status:   Standing     Number of Occurrences:   1     Order Specific Question:   Appointment in     Answer:   6 Weeks        Signed By:  Kade Townsend MD     2017 no

## 2017-10-09 VITALS
HEIGHT: 63 IN | OXYGEN SATURATION: 95 % | DIASTOLIC BLOOD PRESSURE: 82 MMHG | WEIGHT: 148 LBS | HEART RATE: 80 BPM | BODY MASS INDEX: 26.22 KG/M2 | TEMPERATURE: 98 F | SYSTOLIC BLOOD PRESSURE: 154 MMHG | RESPIRATION RATE: 18 BRPM

## 2017-10-09 RX ORDER — RANITIDINE 300 MG/1
300 TABLET ORAL
Qty: 30 | Refills: 3 | Status: DISCONTINUED | COMMUNITY
Start: 2017-04-19 | End: 2017-10-09

## 2017-10-10 ENCOUNTER — APPOINTMENT (OUTPATIENT)
Dept: PULMONOLOGY | Facility: CLINIC | Age: 72
End: 2017-10-10
Payer: MEDICARE

## 2017-10-10 VITALS
DIASTOLIC BLOOD PRESSURE: 62 MMHG | HEART RATE: 100 BPM | SYSTOLIC BLOOD PRESSURE: 140 MMHG | WEIGHT: 147 LBS | BODY MASS INDEX: 26.05 KG/M2 | RESPIRATION RATE: 17 BRPM | OXYGEN SATURATION: 93 % | HEIGHT: 63 IN

## 2017-10-10 PROCEDURE — 99214 OFFICE O/P EST MOD 30 MIN: CPT | Mod: 25

## 2017-10-10 PROCEDURE — 71020: CPT

## 2017-10-10 RX ORDER — AMOXICILLIN 500 MG/1
500 TABLET, FILM COATED ORAL
Qty: 21 | Refills: 0 | Status: DISCONTINUED | COMMUNITY
Start: 2017-08-19 | End: 2017-10-10

## 2017-10-30 ENCOUNTER — APPOINTMENT (OUTPATIENT)
Dept: PULMONOLOGY | Facility: CLINIC | Age: 72
End: 2017-10-30
Payer: MEDICARE

## 2017-10-30 ENCOUNTER — APPOINTMENT (OUTPATIENT)
Dept: CV DIAGNOSITCS | Facility: HOSPITAL | Age: 72
End: 2017-10-30

## 2017-10-30 PROCEDURE — 94727 GAS DIL/WSHOT DETER LNG VOL: CPT

## 2017-10-30 PROCEDURE — 94010 BREATHING CAPACITY TEST: CPT | Mod: 59

## 2017-10-30 PROCEDURE — 94620 PULMONARY STRESS TESTING SIMPLE: CPT

## 2017-10-30 PROCEDURE — 94729 DIFFUSING CAPACITY: CPT

## 2017-10-31 VITALS
RESPIRATION RATE: 16 BRPM | SYSTOLIC BLOOD PRESSURE: 165 MMHG | WEIGHT: 149.69 LBS | HEIGHT: 63 IN | TEMPERATURE: 98 F | HEART RATE: 75 BPM | DIASTOLIC BLOOD PRESSURE: 67 MMHG | OXYGEN SATURATION: 98 %

## 2017-10-31 RX ORDER — CELECOXIB 200 MG/1
0 CAPSULE ORAL
Qty: 0 | Refills: 0 | COMMUNITY

## 2017-10-31 NOTE — PATIENT PROFILE ADULT. - PMH
Asthma    Colitis    GERD (gastroesophageal reflux disease)    Hiatal hernia    Hypertension    Nephrolithiasis  2006  Ulcer Asthma    Colitis    GERD (gastroesophageal reflux disease)    H/O gastric ulcer    Hiatal hernia    HLD (hyperlipidemia)    Hypertension    Nephrolithiasis  2006  Sleep apnea    Tracheobronchomalacia    Ulcer

## 2017-11-01 ENCOUNTER — INPATIENT (INPATIENT)
Facility: HOSPITAL | Age: 72
LOS: 3 days | Discharge: HOME HEALTH | DRG: 165 | End: 2017-11-05
Attending: SPECIALIST | Admitting: SPECIALIST
Payer: MEDICARE

## 2017-11-01 ENCOUNTER — RESULT REVIEW (OUTPATIENT)
Age: 72
End: 2017-11-01

## 2017-11-01 ENCOUNTER — APPOINTMENT (OUTPATIENT)
Dept: THORACIC SURGERY | Facility: HOSPITAL | Age: 72
End: 2017-11-01
Payer: MEDICARE

## 2017-11-01 DIAGNOSIS — Z90.49 ACQUIRED ABSENCE OF OTHER SPECIFIED PARTS OF DIGESTIVE TRACT: Chronic | ICD-10-CM

## 2017-11-01 DIAGNOSIS — Z98.41 CATARACT EXTRACTION STATUS, RIGHT EYE: Chronic | ICD-10-CM

## 2017-11-01 LAB
ANION GAP SERPL CALC-SCNC: 14 MMOL/L — SIGNIFICANT CHANGE UP (ref 5–17)
APTT BLD: 27 SEC — LOW (ref 27.5–37.4)
APTT BLD: 28.9 SEC — SIGNIFICANT CHANGE UP (ref 27.5–37.4)
BASE EXCESS BLDA CALC-SCNC: -2.8 MMOL/L — LOW (ref -2–3)
BASE EXCESS BLDA CALC-SCNC: 0.3 MMOL/L — SIGNIFICANT CHANGE UP (ref -2–3)
BASOPHILS NFR BLD AUTO: 0.1 % — SIGNIFICANT CHANGE UP (ref 0–2)
BUN SERPL-MCNC: 11 MG/DL — SIGNIFICANT CHANGE UP (ref 7–23)
CA-I BLDA-SCNC: 1.12 MMOL/L — SIGNIFICANT CHANGE UP (ref 1.12–1.3)
CALCIUM SERPL-MCNC: 8.6 MG/DL — SIGNIFICANT CHANGE UP (ref 8.4–10.5)
CHLORIDE SERPL-SCNC: 102 MMOL/L — SIGNIFICANT CHANGE UP (ref 96–108)
CO2 SERPL-SCNC: 21 MMOL/L — LOW (ref 22–31)
COHGB MFR BLDA: 0.7 % — SIGNIFICANT CHANGE UP
CREAT SERPL-MCNC: 0.74 MG/DL — SIGNIFICANT CHANGE UP (ref 0.5–1.3)
EOSINOPHIL NFR BLD AUTO: 0.2 % — SIGNIFICANT CHANGE UP (ref 0–6)
GAS PNL BLDA: SIGNIFICANT CHANGE UP
GLUCOSE BLDC GLUCOMTR-MCNC: 104 MG/DL — HIGH (ref 70–99)
GLUCOSE SERPL-MCNC: 178 MG/DL — HIGH (ref 70–99)
HCO3 BLDA-SCNC: 21 MMOL/L — SIGNIFICANT CHANGE UP (ref 21–28)
HCO3 BLDA-SCNC: 24 MMOL/L — SIGNIFICANT CHANGE UP (ref 21–28)
HCT VFR BLD CALC: 33.3 % — LOW (ref 34.5–45)
HCT VFR BLD CALC: 36.1 % — SIGNIFICANT CHANGE UP (ref 34.5–45)
HGB BLD-MCNC: 11.2 G/DL — LOW (ref 11.5–15.5)
HGB BLD-MCNC: 12.4 G/DL — SIGNIFICANT CHANGE UP (ref 11.5–15.5)
HGB BLDA-MCNC: 13.9 G/DL — SIGNIFICANT CHANGE UP (ref 11.5–15.5)
INR BLD: 1.04 — SIGNIFICANT CHANGE UP (ref 0.88–1.16)
INR BLD: 1.09 — SIGNIFICANT CHANGE UP (ref 0.88–1.16)
LYMPHOCYTES # BLD AUTO: 4.8 % — LOW (ref 13–44)
MCHC RBC-ENTMCNC: 31.4 PG — SIGNIFICANT CHANGE UP (ref 27–34)
MCHC RBC-ENTMCNC: 31.6 PG — SIGNIFICANT CHANGE UP (ref 27–34)
MCHC RBC-ENTMCNC: 33.6 G/DL — SIGNIFICANT CHANGE UP (ref 32–36)
MCHC RBC-ENTMCNC: 34.3 G/DL — SIGNIFICANT CHANGE UP (ref 32–36)
MCV RBC AUTO: 92.1 FL — SIGNIFICANT CHANGE UP (ref 80–100)
MCV RBC AUTO: 93.3 FL — SIGNIFICANT CHANGE UP (ref 80–100)
METHGB MFR BLDA: 0.4 % — SIGNIFICANT CHANGE UP
MONOCYTES NFR BLD AUTO: 1.6 % — LOW (ref 2–14)
NEUTROPHILS NFR BLD AUTO: 93.3 % — HIGH (ref 43–77)
O2 CT VFR BLDA CALC: 19.6 ML/DL — SIGNIFICANT CHANGE UP (ref 15–23)
OXYHGB MFR BLDA: 98 % — SIGNIFICANT CHANGE UP (ref 94–100)
PCO2 BLDA: 34 MMHG — SIGNIFICANT CHANGE UP (ref 32–45)
PCO2 BLDA: 35 MMHG — SIGNIFICANT CHANGE UP (ref 32–45)
PH BLDA: 7.41 — SIGNIFICANT CHANGE UP (ref 7.35–7.45)
PH BLDA: 7.45 — SIGNIFICANT CHANGE UP (ref 7.35–7.45)
PLATELET # BLD AUTO: 235 K/UL — SIGNIFICANT CHANGE UP (ref 150–400)
PLATELET # BLD AUTO: 261 K/UL — SIGNIFICANT CHANGE UP (ref 150–400)
PO2 BLDA: 194 MMHG — HIGH (ref 83–108)
PO2 BLDA: 95 MMHG — SIGNIFICANT CHANGE UP (ref 83–108)
POTASSIUM BLDA-SCNC: 3.8 MMOL/L — SIGNIFICANT CHANGE UP (ref 3.5–4.9)
POTASSIUM SERPL-MCNC: 4.2 MMOL/L — SIGNIFICANT CHANGE UP (ref 3.5–5.3)
POTASSIUM SERPL-SCNC: 4.2 MMOL/L — SIGNIFICANT CHANGE UP (ref 3.5–5.3)
PROTHROM AB SERPL-ACNC: 11.6 SEC — SIGNIFICANT CHANGE UP (ref 9.8–12.7)
PROTHROM AB SERPL-ACNC: 12.1 SEC — SIGNIFICANT CHANGE UP (ref 9.8–12.7)
RBC # BLD: 3.57 M/UL — LOW (ref 3.8–5.2)
RBC # BLD: 3.92 M/UL — SIGNIFICANT CHANGE UP (ref 3.8–5.2)
RBC # FLD: 15.1 % — SIGNIFICANT CHANGE UP (ref 10.3–16.9)
RBC # FLD: 15.3 % — SIGNIFICANT CHANGE UP (ref 10.3–16.9)
SAO2 % BLDA: 98 % — SIGNIFICANT CHANGE UP (ref 95–100)
SAO2 % BLDA: 99 % — SIGNIFICANT CHANGE UP (ref 95–100)
SODIUM BLDA-SCNC: 137 MMOL/L — LOW (ref 138–146)
SODIUM SERPL-SCNC: 137 MMOL/L — SIGNIFICANT CHANGE UP (ref 135–145)
WBC # BLD: 10 K/UL — SIGNIFICANT CHANGE UP (ref 3.8–10.5)
WBC # BLD: 15.3 K/UL — HIGH (ref 3.8–10.5)
WBC # FLD AUTO: 10 K/UL — SIGNIFICANT CHANGE UP (ref 3.8–10.5)
WBC # FLD AUTO: 15.3 K/UL — HIGH (ref 3.8–10.5)

## 2017-11-01 PROCEDURE — S2900 ROBOTIC SURGICAL SYSTEM: CPT | Mod: NC

## 2017-11-01 PROCEDURE — 31760 TRACHEOPLASTY INTRATHORACIC: CPT | Mod: 80

## 2017-11-01 PROCEDURE — 31770 REPAIR/GRAFT OF BRONCHUS: CPT

## 2017-11-01 PROCEDURE — 71010: CPT | Mod: 26

## 2017-11-01 PROCEDURE — 31770 REPAIR/GRAFT OF BRONCHUS: CPT | Mod: 80

## 2017-11-01 PROCEDURE — 31760 TRACHEOPLASTY INTRATHORACIC: CPT

## 2017-11-01 PROCEDURE — 31622 DX BRONCHOSCOPE/WASH: CPT

## 2017-11-01 PROCEDURE — 99291 CRITICAL CARE FIRST HOUR: CPT

## 2017-11-01 RX ORDER — LISINOPRIL 2.5 MG/1
1 TABLET ORAL
Qty: 0 | Refills: 0 | COMMUNITY

## 2017-11-01 RX ORDER — KETOROLAC TROMETHAMINE 30 MG/ML
15 SYRINGE (ML) INJECTION ONCE
Qty: 0 | Refills: 0 | Status: DISCONTINUED | OUTPATIENT
Start: 2017-11-01 | End: 2017-11-01

## 2017-11-01 RX ORDER — SENNA PLUS 8.6 MG/1
2 TABLET ORAL AT BEDTIME
Qty: 0 | Refills: 0 | Status: DISCONTINUED | OUTPATIENT
Start: 2017-11-01 | End: 2017-11-05

## 2017-11-01 RX ORDER — HYDROMORPHONE HYDROCHLORIDE 2 MG/ML
0.5 INJECTION INTRAMUSCULAR; INTRAVENOUS; SUBCUTANEOUS ONCE
Qty: 0 | Refills: 0 | Status: DISCONTINUED | OUTPATIENT
Start: 2017-11-01 | End: 2017-11-01

## 2017-11-01 RX ORDER — INFLUENZA VIRUS VACCINE 15; 15; 15; 15 UG/.5ML; UG/.5ML; UG/.5ML; UG/.5ML
0.5 SUSPENSION INTRAMUSCULAR ONCE
Qty: 0 | Refills: 0 | Status: DISCONTINUED | OUTPATIENT
Start: 2017-11-01 | End: 2017-11-02

## 2017-11-01 RX ORDER — BUPIVACAINE 13.3 MG/ML
20 INJECTION, SUSPENSION, LIPOSOMAL INFILTRATION ONCE
Qty: 0 | Refills: 0 | Status: DISCONTINUED | OUTPATIENT
Start: 2017-11-01 | End: 2017-11-02

## 2017-11-01 RX ORDER — KETOROLAC TROMETHAMINE 30 MG/ML
30 SYRINGE (ML) INJECTION ONCE
Qty: 0 | Refills: 0 | Status: DISCONTINUED | OUTPATIENT
Start: 2017-11-01 | End: 2017-11-01

## 2017-11-01 RX ORDER — BUDESONIDE AND FORMOTEROL FUMARATE DIHYDRATE 160; 4.5 UG/1; UG/1
2 AEROSOL RESPIRATORY (INHALATION)
Qty: 0 | Refills: 0 | Status: DISCONTINUED | OUTPATIENT
Start: 2017-11-01 | End: 2017-11-05

## 2017-11-01 RX ORDER — SODIUM CHLORIDE 9 MG/ML
1000 INJECTION, SOLUTION INTRAVENOUS
Qty: 0 | Refills: 0 | Status: DISCONTINUED | OUTPATIENT
Start: 2017-11-01 | End: 2017-11-02

## 2017-11-01 RX ORDER — LIDOCAINE 4 G/100G
1 CREAM TOPICAL DAILY
Qty: 0 | Refills: 0 | Status: DISCONTINUED | OUTPATIENT
Start: 2017-11-01 | End: 2017-11-05

## 2017-11-01 RX ORDER — DOCUSATE SODIUM 100 MG
100 CAPSULE ORAL THREE TIMES A DAY
Qty: 0 | Refills: 0 | Status: DISCONTINUED | OUTPATIENT
Start: 2017-11-01 | End: 2017-11-05

## 2017-11-01 RX ORDER — MONTELUKAST 4 MG/1
10 TABLET, CHEWABLE ORAL AT BEDTIME
Qty: 0 | Refills: 0 | Status: DISCONTINUED | OUTPATIENT
Start: 2017-11-01 | End: 2017-11-05

## 2017-11-01 RX ORDER — PANTOPRAZOLE SODIUM 20 MG/1
40 TABLET, DELAYED RELEASE ORAL
Qty: 0 | Refills: 0 | Status: DISCONTINUED | OUTPATIENT
Start: 2017-11-01 | End: 2017-11-05

## 2017-11-01 RX ORDER — CEFAZOLIN SODIUM 1 G
2000 VIAL (EA) INJECTION EVERY 8 HOURS
Qty: 0 | Refills: 0 | Status: COMPLETED | OUTPATIENT
Start: 2017-11-01 | End: 2017-11-03

## 2017-11-01 RX ORDER — ALBUTEROL 90 UG/1
2 AEROSOL, METERED ORAL EVERY 6 HOURS
Qty: 0 | Refills: 0 | Status: DISCONTINUED | OUTPATIENT
Start: 2017-11-01 | End: 2017-11-05

## 2017-11-01 RX ORDER — TIOTROPIUM BROMIDE 18 UG/1
1 CAPSULE ORAL; RESPIRATORY (INHALATION) DAILY
Qty: 0 | Refills: 0 | Status: DISCONTINUED | OUTPATIENT
Start: 2017-11-01 | End: 2017-11-05

## 2017-11-01 RX ADMIN — Medication 15 MILLIGRAM(S): at 22:32

## 2017-11-01 RX ADMIN — HYDROMORPHONE HYDROCHLORIDE 0.5 MILLIGRAM(S): 2 INJECTION INTRAMUSCULAR; INTRAVENOUS; SUBCUTANEOUS at 14:30

## 2017-11-01 RX ADMIN — Medication 30 MILLIGRAM(S): at 13:20

## 2017-11-01 RX ADMIN — HYDROMORPHONE HYDROCHLORIDE 0.5 MILLIGRAM(S): 2 INJECTION INTRAMUSCULAR; INTRAVENOUS; SUBCUTANEOUS at 13:40

## 2017-11-01 RX ADMIN — HYDROMORPHONE HYDROCHLORIDE 0.5 MILLIGRAM(S): 2 INJECTION INTRAMUSCULAR; INTRAVENOUS; SUBCUTANEOUS at 16:00

## 2017-11-01 RX ADMIN — Medication 100 MILLIGRAM(S): at 17:58

## 2017-11-01 RX ADMIN — LIDOCAINE 1 PATCH: 4 CREAM TOPICAL at 22:32

## 2017-11-01 RX ADMIN — MONTELUKAST 10 MILLIGRAM(S): 4 TABLET, CHEWABLE ORAL at 22:32

## 2017-11-01 RX ADMIN — Medication 15 MILLIGRAM(S): at 22:47

## 2017-11-01 RX ADMIN — HYDROMORPHONE HYDROCHLORIDE 0.5 MILLIGRAM(S): 2 INJECTION INTRAMUSCULAR; INTRAVENOUS; SUBCUTANEOUS at 16:15

## 2017-11-01 RX ADMIN — Medication 30 MILLIGRAM(S): at 14:00

## 2017-11-01 RX ADMIN — SENNA PLUS 2 TABLET(S): 8.6 TABLET ORAL at 22:32

## 2017-11-01 RX ADMIN — Medication 100 MILLIGRAM(S): at 22:32

## 2017-11-01 NOTE — H&P PST ADULT - PMH
Asthma    Colitis    GERD (gastroesophageal reflux disease)    H/O gastric ulcer    Hiatal hernia    HLD (hyperlipidemia)    Hypertension    Nephrolithiasis  2006  Sleep apnea    Tracheobronchomalacia    Ulcer

## 2017-11-01 NOTE — H&P PST ADULT - HISTORY OF PRESENT ILLNESS
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  SHe was referred by Dr. Denzel Saunders and presents today for a follow up visit for her persistent cough, GUALLPA, and TBM found on CT chest.    The patient had an EGD on 3/13/17 revealing:  -Normal esophagus  -Gastric mucosal atrophy  -Normal examined duodenum  -Pathology revealing collagenous colitis    Her CXR on 5/1/17 revealed:  -small cluster of tree-in-bud opacities within the right lower lobe new since March 2016 representing impacted distal airway likely due to mucous  -Flattening of the trachea on dynamic expiration by about 75%, with significant flattening of themain stem bronchi    PFTs on 5/30/17 then showed FEV1 2.33, 96% pre, DLCO 19.2    She then underwent an awake bronchoscopy on July 25 2017 revealing:  -approximately 70% collapse of the distal trachea   -near complete collapse of the left mainstem bronchus  - 80-90% of collapse of the right mainstem bronchus    Since her last visit she has continued to follow up with her pulmonologist Dr. Saunders whom had the patient undergo a sleep study on 8/30/17 revealing mild sleep apnea - AHI 8.5, CARLOS 8.5, snore index 2.2%    She presented with persistent worsening cough rarely productive of yellow/white mucous and worse for the past 2 weeks after patient clean an air conditioning unit at home.  She self prescribed augmentin which she has been on for 8 days without relief.  She denies hemoptysis, SOB, chest pain, palpitations, H/A's, dizziness, PND, orthopnea, LE edema, F/C/N/V/D, syncope, or any recent illneess.

## 2017-11-01 NOTE — H&P PST ADULT - ASSESSMENT
70 y/o F with tracheobronchomalacia who presents today for elective R VATS RA tracheobronchoplasty   - type and screen x 2   - 2 u PRBC on hold for OR   - pt consented for above procedure

## 2017-11-01 NOTE — PROGRESS NOTE ADULT - SUBJECTIVE AND OBJECTIVE BOX
CTICU  CRITICAL  CARE  attending     Hand off received 					   Pertinent clinical, laboratory, radiographic, hemodynamic, echocardiographic, respiratory data, microbiologic data and chart were reviewed and analyzed frequently throughout the course of the day and night  Patient seen and examined with CTS/ SH attending at bedside  Pt is a 72y , Female, HEALTH ISSUES - PROBLEM Dx:      , FAMILY HISTORY:  No pertinent family history in first degree relatives  PAST MEDICAL & SURGICAL HISTORY:  Tracheobronchomalacia  Sleep apnea  H/O gastric ulcer  HLD (hyperlipidemia)  GERD (gastroesophageal reflux disease)  Nephrolithiasis: 2006  Hiatal hernia  Asthma  Colitis  Ulcer  Hypertension  History of cataract surgery, right  History of appendectomy    Patient is a 72y old  Female who presents with a chief complaint of TBM (01 Nov 2017 12:52)      14 system review was unremarkable  acute changes include acute respiratory failure  Vital signs, hemodynamic and respiratory parameters were reviewed from the bedside nursing flowsheet.  ICU Vital Signs Last 24 Hrs  T(C): 36.3 (01 Nov 2017 17:31), Max: 36.4 (01 Nov 2017 14:24)  T(F): 97.4 (01 Nov 2017 17:31), Max: 97.6 (01 Nov 2017 14:24)  HR: 88 (01 Nov 2017 20:00) (64 - 90)  BP: 126/65 (01 Nov 2017 13:00) (126/65 - 126/65)  BP(mean): 85 (01 Nov 2017 13:00) (85 - 85)  ABP: 128/52 (01 Nov 2017 20:00) (122/48 - 192/70)  ABP(mean): 80 (01 Nov 2017 20:00) (74 - 108)  RR: 21 (01 Nov 2017 20:00) (11 - 28)  SpO2: 98% (01 Nov 2017 20:00) (97% - 100%)    Adult Advanced Hemodynamics Last 24 Hrs  CVP(mm Hg): --  CVP(cm H2O): --  CO: --  CI: --  PA: --  PA(mean): --  PCWP: --  SVR: --  SVRI: --  PVR: --  PVRI: --, ABG - ( 01 Nov 2017 13:46 )  pH: 7.39  /  pCO2: 37    /  pO2: 91    / HCO3: 22    / Base Excess: -2.5  /  SaO2: 97                  Intake and output was reviewed and the fluid balance was calculated  Daily     Daily   I&O's Summary    01 Nov 2017 07:01  -  01 Nov 2017 20:54  --------------------------------------------------------  IN: 690 mL / OUT: 860 mL / NET: -170 mL        All lines and drain sites were assessed  Glycemic trend was reviewedCAPILLARY BLOOD GLUCOSE  104 (01 Nov 2017 09:00)      POCT Blood Glucose.: 104 mg/dL (01 Nov 2017 09:01)    No acute change in mental status  Auscultation of the chest reveals equal bs  Abdomen is soft  Extremities are warm and well perfused  Wounds appear clean and unremarkable  Antibiotics are periop    labs  CBC Full  -  ( 01 Nov 2017 13:47 )  WBC Count : 15.3 K/uL  Hemoglobin : 12.4 g/dL  Hematocrit : 36.1 %  Platelet Count - Automated : 261 K/uL  Mean Cell Volume : 92.1 fL  Mean Cell Hemoglobin : 31.6 pg  Mean Cell Hemoglobin Concentration : 34.3 g/dL  Auto Neutrophil # : x  Auto Lymphocyte # : x  Auto Monocyte # : x  Auto Eosinophil # : x  Auto Basophil # : x  Auto Neutrophil % : 93.3 %  Auto Lymphocyte % : 4.8 %  Auto Monocyte % : 1.6 %  Auto Eosinophil % : 0.2 %  Auto Basophil % : 0.1 %    11-01    137  |  102  |  11  ----------------------------<  178<H>  4.2   |  21<L>  |  0.74    Ca    8.6      01 Nov 2017 13:47      PT/INR - ( 01 Nov 2017 13:47 )   PT: 11.6 sec;   INR: 1.04          PTT - ( 01 Nov 2017 13:47 )  PTT:28.9 sec  The current medications were reviewed   MEDICATIONS  (STANDING):  buDESOnide 160 MICROgram(s)/formoterol 4.5 MICROgram(s) Inhaler 2 Puff(s) Inhalation two times a day  BUpivacaine liposome 1.3% Injectable (no eMAR) 20 milliLiter(s) Local Injection once  ceFAZolin   IVPB 2000 milliGRAM(s) IV Intermittent every 8 hours  docusate sodium 100 milliGRAM(s) Oral three times a day  influenza   Vaccine 0.5 milliLiter(s) IntraMuscular once  lactated ringers. 1000 milliLiter(s) (50 mL/Hr) IV Continuous <Continuous>  montelukast 10 milliGRAM(s) Oral at bedtime  pantoprazole    Tablet 40 milliGRAM(s) Oral before breakfast  senna 2 Tablet(s) Oral at bedtime  tiotropium 18 MICROgram(s) Capsule 1 Capsule(s) Inhalation daily    MEDICATIONS  (PRN):  ALBUTerol    90 MICROgram(s) HFA Inhaler 2 Puff(s) Inhalation every 6 hours PRN Shortness of Breath and/or Wheezing       PROBLEM LIST/ ASSESSMENT:  HEALTH ISSUES - PROBLEM Dx:      ,   Patient is a 72y old  Female who presents with a chief complaint of TBM (01 Nov 2017 12:52)     s/p tbm repair  acute changes include acute respiratory failure    My plan includes :  close hemodynamic, ventilatory and drain monitoring and management per post op routine    Monitor for arrhythmias and monitor parameters for organ perfusion  monitor neurologic status  Head of the bed should remain elevated to 45 deg .   chest PT and IS will be encouraged  monitor adequacy of oxygenation and ventilation and attempt to wean oxygen  Nutritional goals will be met using po eventually , ensure adequate caloric intake and montior the same  Stress ulcer and VTE prophylaxis will be achieved    Glycemic control is satisfactory  Electrolytes have been repleted as necessary and wound care has been carried out. Pain control has been achieved.   agressive physical therapy and early mobility and ambulation goals will be met   The family was updated about the course and plan  CRITICAL CARE TIME SPENT in evaluation and management, reassessments, review and interpretation of labs and x-rays, ventilator and hemodynamic management, formulating a plan and coordinating care: ___90____ MIN.  Time does not include procedural time.  CTICU ATTENDING     					    Donte Lepe MD

## 2017-11-02 LAB
ANION GAP SERPL CALC-SCNC: 11 MMOL/L — SIGNIFICANT CHANGE UP (ref 5–17)
ANION GAP SERPL CALC-SCNC: 13 MMOL/L — SIGNIFICANT CHANGE UP (ref 5–17)
APTT BLD: 27.3 SEC — LOW (ref 27.5–37.4)
BASOPHILS NFR BLD AUTO: 0 % — SIGNIFICANT CHANGE UP (ref 0–2)
BUN SERPL-MCNC: 10 MG/DL — SIGNIFICANT CHANGE UP (ref 7–23)
BUN SERPL-MCNC: 9 MG/DL — SIGNIFICANT CHANGE UP (ref 7–23)
CALCIUM SERPL-MCNC: 8.2 MG/DL — LOW (ref 8.4–10.5)
CALCIUM SERPL-MCNC: 8.5 MG/DL — SIGNIFICANT CHANGE UP (ref 8.4–10.5)
CHLORIDE SERPL-SCNC: 102 MMOL/L — SIGNIFICANT CHANGE UP (ref 96–108)
CHLORIDE SERPL-SCNC: 102 MMOL/L — SIGNIFICANT CHANGE UP (ref 96–108)
CO2 SERPL-SCNC: 23 MMOL/L — SIGNIFICANT CHANGE UP (ref 22–31)
CO2 SERPL-SCNC: 23 MMOL/L — SIGNIFICANT CHANGE UP (ref 22–31)
CREAT SERPL-MCNC: 0.74 MG/DL — SIGNIFICANT CHANGE UP (ref 0.5–1.3)
CREAT SERPL-MCNC: 0.75 MG/DL — SIGNIFICANT CHANGE UP (ref 0.5–1.3)
EOSINOPHIL NFR BLD AUTO: 1.2 % — SIGNIFICANT CHANGE UP (ref 0–6)
GAS PNL BLDA: SIGNIFICANT CHANGE UP
GLUCOSE SERPL-MCNC: 102 MG/DL — HIGH (ref 70–99)
GLUCOSE SERPL-MCNC: 134 MG/DL — HIGH (ref 70–99)
HCT VFR BLD CALC: 32.5 % — LOW (ref 34.5–45)
HGB BLD-MCNC: 11 G/DL — LOW (ref 11.5–15.5)
INR BLD: 1.12 — SIGNIFICANT CHANGE UP (ref 0.88–1.16)
LYMPHOCYTES # BLD AUTO: 15.9 % — SIGNIFICANT CHANGE UP (ref 13–44)
MCHC RBC-ENTMCNC: 31.3 PG — SIGNIFICANT CHANGE UP (ref 27–34)
MCHC RBC-ENTMCNC: 33.8 G/DL — SIGNIFICANT CHANGE UP (ref 32–36)
MCV RBC AUTO: 92.6 FL — SIGNIFICANT CHANGE UP (ref 80–100)
MONOCYTES NFR BLD AUTO: 7.8 % — SIGNIFICANT CHANGE UP (ref 2–14)
NEUTROPHILS NFR BLD AUTO: 75.1 % — SIGNIFICANT CHANGE UP (ref 43–77)
PLATELET # BLD AUTO: 225 K/UL — SIGNIFICANT CHANGE UP (ref 150–400)
POTASSIUM SERPL-MCNC: 3.8 MMOL/L — SIGNIFICANT CHANGE UP (ref 3.5–5.3)
POTASSIUM SERPL-MCNC: 4 MMOL/L — SIGNIFICANT CHANGE UP (ref 3.5–5.3)
POTASSIUM SERPL-SCNC: 3.8 MMOL/L — SIGNIFICANT CHANGE UP (ref 3.5–5.3)
POTASSIUM SERPL-SCNC: 4 MMOL/L — SIGNIFICANT CHANGE UP (ref 3.5–5.3)
PROTHROM AB SERPL-ACNC: 12.5 SEC — SIGNIFICANT CHANGE UP (ref 9.8–12.7)
RBC # BLD: 3.51 M/UL — LOW (ref 3.8–5.2)
RBC # FLD: 15.4 % — SIGNIFICANT CHANGE UP (ref 10.3–16.9)
SODIUM SERPL-SCNC: 136 MMOL/L — SIGNIFICANT CHANGE UP (ref 135–145)
SODIUM SERPL-SCNC: 138 MMOL/L — SIGNIFICANT CHANGE UP (ref 135–145)
WBC # BLD: 12.9 K/UL — HIGH (ref 3.8–10.5)
WBC # FLD AUTO: 12.9 K/UL — HIGH (ref 3.8–10.5)

## 2017-11-02 PROCEDURE — 71010: CPT | Mod: 26,76

## 2017-11-02 RX ORDER — OXYCODONE HYDROCHLORIDE 5 MG/1
5 TABLET ORAL EVERY 6 HOURS
Qty: 0 | Refills: 0 | Status: DISCONTINUED | OUTPATIENT
Start: 2017-11-02 | End: 2017-11-05

## 2017-11-02 RX ORDER — KETOROLAC TROMETHAMINE 30 MG/ML
15 SYRINGE (ML) INJECTION EVERY 8 HOURS
Qty: 0 | Refills: 0 | Status: DISCONTINUED | OUTPATIENT
Start: 2017-11-02 | End: 2017-11-03

## 2017-11-02 RX ORDER — SODIUM CHLORIDE 9 MG/ML
3 INJECTION INTRAMUSCULAR; INTRAVENOUS; SUBCUTANEOUS EVERY 8 HOURS
Qty: 0 | Refills: 0 | Status: DISCONTINUED | OUTPATIENT
Start: 2017-11-02 | End: 2017-11-05

## 2017-11-02 RX ORDER — POTASSIUM CHLORIDE 20 MEQ
20 PACKET (EA) ORAL ONCE
Qty: 0 | Refills: 0 | Status: COMPLETED | OUTPATIENT
Start: 2017-11-02 | End: 2017-11-02

## 2017-11-02 RX ORDER — ACETAMINOPHEN 500 MG
1000 TABLET ORAL ONCE
Qty: 0 | Refills: 0 | Status: COMPLETED | OUTPATIENT
Start: 2017-11-02 | End: 2017-11-02

## 2017-11-02 RX ORDER — HEPARIN SODIUM 5000 [USP'U]/ML
5000 INJECTION INTRAVENOUS; SUBCUTANEOUS EVERY 8 HOURS
Qty: 0 | Refills: 0 | Status: DISCONTINUED | OUTPATIENT
Start: 2017-11-02 | End: 2017-11-05

## 2017-11-02 RX ORDER — ACETAMINOPHEN 500 MG
650 TABLET ORAL EVERY 6 HOURS
Qty: 0 | Refills: 0 | Status: DISCONTINUED | OUTPATIENT
Start: 2017-11-02 | End: 2017-11-05

## 2017-11-02 RX ORDER — FAMOTIDINE 10 MG/ML
20 INJECTION INTRAVENOUS
Qty: 0 | Refills: 0 | Status: DISCONTINUED | OUTPATIENT
Start: 2017-11-02 | End: 2017-11-05

## 2017-11-02 RX ORDER — HYDROMORPHONE HYDROCHLORIDE 2 MG/ML
0.5 INJECTION INTRAMUSCULAR; INTRAVENOUS; SUBCUTANEOUS ONCE
Qty: 0 | Refills: 0 | Status: DISCONTINUED | OUTPATIENT
Start: 2017-11-02 | End: 2017-11-02

## 2017-11-02 RX ORDER — AMITRIPTYLINE HCL 25 MG
10 TABLET ORAL
Qty: 0 | Refills: 0 | Status: DISCONTINUED | OUTPATIENT
Start: 2017-11-02 | End: 2017-11-05

## 2017-11-02 RX ADMIN — Medication 100 MILLIGRAM(S): at 17:36

## 2017-11-02 RX ADMIN — SENNA PLUS 2 TABLET(S): 8.6 TABLET ORAL at 22:41

## 2017-11-02 RX ADMIN — SODIUM CHLORIDE 3 MILLILITER(S): 9 INJECTION INTRAMUSCULAR; INTRAVENOUS; SUBCUTANEOUS at 10:52

## 2017-11-02 RX ADMIN — MONTELUKAST 10 MILLIGRAM(S): 4 TABLET, CHEWABLE ORAL at 22:41

## 2017-11-02 RX ADMIN — Medication 100 MILLIGRAM(S): at 09:37

## 2017-11-02 RX ADMIN — Medication 0.5 MILLIGRAM(S): at 02:33

## 2017-11-02 RX ADMIN — OXYCODONE HYDROCHLORIDE 5 MILLIGRAM(S): 5 TABLET ORAL at 16:00

## 2017-11-02 RX ADMIN — BUDESONIDE AND FORMOTEROL FUMARATE DIHYDRATE 2 PUFF(S): 160; 4.5 AEROSOL RESPIRATORY (INHALATION) at 21:41

## 2017-11-02 RX ADMIN — Medication 100 MILLIGRAM(S): at 22:41

## 2017-11-02 RX ADMIN — Medication 100 MILLIGRAM(S): at 06:30

## 2017-11-02 RX ADMIN — TIOTROPIUM BROMIDE 1 CAPSULE(S): 18 CAPSULE ORAL; RESPIRATORY (INHALATION) at 22:47

## 2017-11-02 RX ADMIN — OXYCODONE HYDROCHLORIDE 5 MILLIGRAM(S): 5 TABLET ORAL at 23:19

## 2017-11-02 RX ADMIN — Medication 15 MILLIGRAM(S): at 16:00

## 2017-11-02 RX ADMIN — BUDESONIDE AND FORMOTEROL FUMARATE DIHYDRATE 2 PUFF(S): 160; 4.5 AEROSOL RESPIRATORY (INHALATION) at 05:47

## 2017-11-02 RX ADMIN — OXYCODONE HYDROCHLORIDE 5 MILLIGRAM(S): 5 TABLET ORAL at 15:08

## 2017-11-02 RX ADMIN — Medication 400 MILLIGRAM(S): at 08:10

## 2017-11-02 RX ADMIN — HEPARIN SODIUM 5000 UNIT(S): 5000 INJECTION INTRAVENOUS; SUBCUTANEOUS at 22:41

## 2017-11-02 RX ADMIN — Medication 100 MILLIGRAM(S): at 00:48

## 2017-11-02 RX ADMIN — HYDROMORPHONE HYDROCHLORIDE 0.5 MILLIGRAM(S): 2 INJECTION INTRAMUSCULAR; INTRAVENOUS; SUBCUTANEOUS at 08:25

## 2017-11-02 RX ADMIN — LIDOCAINE 1 PATCH: 4 CREAM TOPICAL at 15:08

## 2017-11-02 RX ADMIN — Medication 10 MILLIGRAM(S): at 17:36

## 2017-11-02 RX ADMIN — PANTOPRAZOLE SODIUM 40 MILLIGRAM(S): 20 TABLET, DELAYED RELEASE ORAL at 06:30

## 2017-11-02 RX ADMIN — SODIUM CHLORIDE 3 MILLILITER(S): 9 INJECTION INTRAMUSCULAR; INTRAVENOUS; SUBCUTANEOUS at 22:21

## 2017-11-02 RX ADMIN — FAMOTIDINE 20 MILLIGRAM(S): 10 INJECTION INTRAVENOUS at 17:36

## 2017-11-02 RX ADMIN — Medication 100 MILLIEQUIVALENT(S): at 00:48

## 2017-11-02 RX ADMIN — Medication 15 MILLIGRAM(S): at 15:08

## 2017-11-02 RX ADMIN — HYDROMORPHONE HYDROCHLORIDE 0.5 MILLIGRAM(S): 2 INJECTION INTRAMUSCULAR; INTRAVENOUS; SUBCUTANEOUS at 08:10

## 2017-11-02 NOTE — PROGRESS NOTE ADULT - SUBJECTIVE AND OBJECTIVE BOX
CTICU TO 9LA TRANSFER NOTE     Operation / Date:     SUBJECTIVE ASSESSMENT:  72y Female     Acute overnight Events:  Post-op BM: y/n  Incentive spirometry:     Vital Signs Last 24 Hrs  T(C): 36.9 (02 Nov 2017 14:46), Max: 37.7 (02 Nov 2017 02:00)  T(F): 98.4 (02 Nov 2017 14:46), Max: 99.8 (02 Nov 2017 02:00)  HR: 784 (02 Nov 2017 10:24) (74 - 784)  BP: 104/53 (02 Nov 2017 10:24) (104/53 - 117/56)  BP(mean): 74 (02 Nov 2017 09:00) (74 - 74)  RR: 14 (02 Nov 2017 10:24) (13 - 28)  SpO2: 95% (02 Nov 2017 10:24) (95% - 99%)  I&O's Detail    01 Nov 2017 07:01  -  02 Nov 2017 07:00  --------------------------------------------------------  IN:    IV PiggyBack: 250 mL    lactated ringers.: 110 mL    Oral Fluid: 940 mL  Total IN: 1300 mL    OUT:    Chest Tube: 135 mL    Indwelling Catheter - Urethral: 1830 mL  Total OUT: 1965 mL    Total NET: -665 mL      02 Nov 2017 07:01  -  02 Nov 2017 15:08  --------------------------------------------------------  IN:    Oral Fluid: 120 mL  Total IN: 120 mL    OUT:    Chest Tube: 20 mL    Indwelling Catheter - Urethral: 150 mL  Total OUT: 170 mL    Total NET: -50 mL          CHEST TUBE:  Yes/No. AIR LEAKS: Yes/No. Suction / H2O SEAL.   LETI DRAIN:  Yes/No.  EPICARDIAL WIRES: Yes/No.  TIE DOWNS: Yes/No.  OMER: Yes/No.    PHYSICAL EXAM:    General:     Neurological:    Cardiovascular:    Respiratory:    Gastrointestinal:    Extremities:    Vascular:    Incision Sites:    LABS:                        11.0   12.9  )-----------( 225      ( 02 Nov 2017 03:57 )             32.5       COUMADIN:  Yes/No. REASON: .    PT/INR - ( 02 Nov 2017 03:57 )   PT: 12.5 sec;   INR: 1.12          PTT - ( 02 Nov 2017 03:57 )  PTT:27.3 sec    11-02    136  |  102  |  9   ----------------------------<  102<H>  4.0   |  23  |  0.74    Ca    8.2<L>      02 Nov 2017 03:57            MEDICATIONS  (STANDING):  amitriptyline 10 milliGRAM(s) Oral two times a day  buDESOnide 160 MICROgram(s)/formoterol 4.5 MICROgram(s) Inhaler 2 Puff(s) Inhalation two times a day  ceFAZolin   IVPB 2000 milliGRAM(s) IV Intermittent every 8 hours  docusate sodium 100 milliGRAM(s) Oral three times a day  famotidine    Tablet 20 milliGRAM(s) Oral two times a day  lidocaine   Patch 1 Patch Transdermal daily  montelukast 10 milliGRAM(s) Oral at bedtime  pantoprazole    Tablet 40 milliGRAM(s) Oral before breakfast  senna 2 Tablet(s) Oral at bedtime  sodium chloride 0.9% lock flush 3 milliLiter(s) IV Push every 8 hours  tiotropium 18 MICROgram(s) Capsule 1 Capsule(s) Inhalation daily    MEDICATIONS  (PRN):  acetaminophen   Tablet. 650 milliGRAM(s) Oral every 6 hours PRN Mild Pain (1 - 3)  ALBUTerol    90 MICROgram(s) HFA Inhaler 2 Puff(s) Inhalation every 6 hours PRN Shortness of Breath and/or Wheezing  ketorolac   Injectable 15 milliGRAM(s) IV Push every 8 hours PRN Moderate Pain (4 - 6)  oxyCODONE    IR 5 milliGRAM(s) Oral every 6 hours PRN Severe Pain (7 - 10)        RADIOLOGY & ADDITIONAL TESTS: CTICU TO [facility] TRANSFER NOTE     Operation / Date: R VATS Tracheobronchoplasty on 11/1/17    SUBJECTIVE ASSESSMENT:  72y Female seen at bed after transfer from CTICU.  She is doing much better after the removal of her chest tube this AM with notably less pain.  She is tolerating room air and denies any acute overnight events as well as HA, AMS, CP, palpitations, SOB, n/v/d, fever.       Vital Signs Last 24 Hrs  T(C): 36.9 (02 Nov 2017 14:46), Max: 37.7 (02 Nov 2017 02:00)  T(F): 98.4 (02 Nov 2017 14:46), Max: 99.8 (02 Nov 2017 02:00)  HR: 784 (02 Nov 2017 10:24) (74 - 784)  BP: 104/53 (02 Nov 2017 10:24) (104/53 - 117/56)  BP(mean): 74 (02 Nov 2017 09:00) (74 - 74)  RR: 14 (02 Nov 2017 10:24) (13 - 28)  SpO2: 95% (02 Nov 2017 10:24) (95% - 99%)  I&O's Detail    01 Nov 2017 07:01  -  02 Nov 2017 07:00  --------------------------------------------------------  IN:    IV PiggyBack: 250 mL    lactated ringers.: 110 mL    Oral Fluid: 940 mL  Total IN: 1300 mL    OUT:    Chest Tube: 135 mL    Indwelling Catheter - Urethral: 1830 mL  Total OUT: 1965 mL    Total NET: -665 mL      02 Nov 2017 07:01  -  02 Nov 2017 15:08  --------------------------------------------------------  IN:    Oral Fluid: 120 mL  Total IN: 120 mL    OUT:    Chest Tube: 20 mL    Indwelling Catheter - Urethral: 150 mL  Total OUT: 170 mL    Total NET: -50 mL    CHEST TUBE:  No.   LETI DRAIN:  No.  EPICARDIAL WIRES:No.  TIE DOWNS: Yes.  OMER: No.    PHYSICAL EXAM:    General: OOB to chair, no acute distress.     Neurological: AAOx3, no AMS or focal deficits.     Cardiovascular: RRR, S1/S2, no m/r/g.     Respiratory: No acute distress on RA, intermittent cough, CTA b/l, no w/r/r.     Gastrointestinal: ND, NBS, non-TTP.    Extremities: Warm and well perfused, no calf ttp or edema b/l.     Vascular: Pulses 2+    Incision Sites: R VATS incisions: C/D/I    LABS:                        11.0   12.9  )-----------( 225      ( 02 Nov 2017 03:57 )             32.5       COUMADIN:  No  PT/INR - ( 02 Nov 2017 03:57 )   PT: 12.5 sec;   INR: 1.12          PTT - ( 02 Nov 2017 03:57 )  PTT:27.3 sec    11-02    136  |  102  |  9   ----------------------------<  102<H>  4.0   |  23  |  0.74    Ca    8.2<L>      02 Nov 2017 03:57    MEDICATIONS  (STANDING):  amitriptyline 10 milliGRAM(s) Oral two times a day  buDESOnide 160 MICROgram(s)/formoterol 4.5 MICROgram(s) Inhaler 2 Puff(s) Inhalation two times a day  ceFAZolin   IVPB 2000 milliGRAM(s) IV Intermittent every 8 hours  docusate sodium 100 milliGRAM(s) Oral three times a day  famotidine    Tablet 20 milliGRAM(s) Oral two times a day  lidocaine   Patch 1 Patch Transdermal daily  montelukast 10 milliGRAM(s) Oral at bedtime  pantoprazole    Tablet 40 milliGRAM(s) Oral before breakfast  senna 2 Tablet(s) Oral at bedtime  sodium chloride 0.9% lock flush 3 milliLiter(s) IV Push every 8 hours  tiotropium 18 MICROgram(s) Capsule 1 Capsule(s) Inhalation daily    MEDICATIONS  (PRN):  acetaminophen   Tablet. 650 milliGRAM(s) Oral every 6 hours PRN Mild Pain (1 - 3)  ALBUTerol    90 MICROgram(s) HFA Inhaler 2 Puff(s) Inhalation every 6 hours PRN Shortness of Breath and/or Wheezing  ketorolac   Injectable 15 milliGRAM(s) IV Push every 8 hours PRN Moderate Pain (4 - 6)  oxyCODONE    IR 5 milliGRAM(s) Oral every 6 hours PRN Severe Pain (7 - 10)      RADIOLOGY & ADDITIONAL TESTS:  < from: Xray Chest 1 View AP-PORTABLE IMMEDIATE (11.02.17 @ 08:42) >    EXAM:  XR CHEST 1 VIEW PORT IMMEDIATE                          PROCEDURE DATE:  11/02/2017        INTERPRETATION:    PORTABLE CHEST X-RAY    Indication: CT removal    Bedside examination of the chest dated 11/2/2017 8:42 AM is comparedto   the previous study of 11/2/2017.    Findings: Right-sided chest tube has been removed. No pneumothorax is   seen. No new pleural parenchymal abnormality. There is still small   subcutaneous emphysema.    Impression: Right chest tube removed. No pneumothorax seen.    < end of copied text >

## 2017-11-02 NOTE — PHYSICAL THERAPY INITIAL EVALUATION ADULT - PERTINENT HX OF CURRENT PROBLEM, REHAB EVAL
71 year old female referred from follow up visit presenting with persistent cough, GUALLPA, and TBM found on CT chest.

## 2017-11-03 PROBLEM — E78.5 HYPERLIPIDEMIA, UNSPECIFIED: Chronic | Status: ACTIVE | Noted: 2017-10-31

## 2017-11-03 PROBLEM — G47.30 SLEEP APNEA, UNSPECIFIED: Chronic | Status: ACTIVE | Noted: 2017-10-31

## 2017-11-03 PROBLEM — K44.9 DIAPHRAGMATIC HERNIA WITHOUT OBSTRUCTION OR GANGRENE: Chronic | Status: ACTIVE | Noted: 2017-10-31

## 2017-11-03 PROBLEM — N20.0 CALCULUS OF KIDNEY: Chronic | Status: ACTIVE | Noted: 2017-10-31

## 2017-11-03 PROBLEM — K21.9 GASTRO-ESOPHAGEAL REFLUX DISEASE WITHOUT ESOPHAGITIS: Chronic | Status: ACTIVE | Noted: 2017-10-31

## 2017-11-03 PROBLEM — J39.8 OTHER SPECIFIED DISEASES OF UPPER RESPIRATORY TRACT: Chronic | Status: ACTIVE | Noted: 2017-10-31

## 2017-11-03 PROBLEM — J45.909 UNSPECIFIED ASTHMA, UNCOMPLICATED: Chronic | Status: ACTIVE | Noted: 2017-10-31

## 2017-11-03 PROBLEM — Z87.19 PERSONAL HISTORY OF OTHER DISEASES OF THE DIGESTIVE SYSTEM: Chronic | Status: ACTIVE | Noted: 2017-10-31

## 2017-11-03 LAB
ANION GAP SERPL CALC-SCNC: 13 MMOL/L — SIGNIFICANT CHANGE UP (ref 5–17)
APTT BLD: 26.9 SEC — LOW (ref 27.5–37.4)
BUN SERPL-MCNC: 10 MG/DL — SIGNIFICANT CHANGE UP (ref 7–23)
CALCIUM SERPL-MCNC: 8.5 MG/DL — SIGNIFICANT CHANGE UP (ref 8.4–10.5)
CHLORIDE SERPL-SCNC: 101 MMOL/L — SIGNIFICANT CHANGE UP (ref 96–108)
CO2 SERPL-SCNC: 23 MMOL/L — SIGNIFICANT CHANGE UP (ref 22–31)
CREAT SERPL-MCNC: 0.87 MG/DL — SIGNIFICANT CHANGE UP (ref 0.5–1.3)
GLUCOSE SERPL-MCNC: 97 MG/DL — SIGNIFICANT CHANGE UP (ref 70–99)
HCT VFR BLD CALC: 32.1 % — LOW (ref 34.5–45)
HGB BLD-MCNC: 10.7 G/DL — LOW (ref 11.5–15.5)
INR BLD: 1.15 — SIGNIFICANT CHANGE UP (ref 0.88–1.16)
MAGNESIUM SERPL-MCNC: 2.1 MG/DL — SIGNIFICANT CHANGE UP (ref 1.6–2.6)
MCHC RBC-ENTMCNC: 31.4 PG — SIGNIFICANT CHANGE UP (ref 27–34)
MCHC RBC-ENTMCNC: 33.3 G/DL — SIGNIFICANT CHANGE UP (ref 32–36)
MCV RBC AUTO: 94.1 FL — SIGNIFICANT CHANGE UP (ref 80–100)
PHOSPHATE SERPL-MCNC: 2.6 MG/DL — SIGNIFICANT CHANGE UP (ref 2.5–4.5)
PLATELET # BLD AUTO: 206 K/UL — SIGNIFICANT CHANGE UP (ref 150–400)
POTASSIUM SERPL-MCNC: 3.8 MMOL/L — SIGNIFICANT CHANGE UP (ref 3.5–5.3)
POTASSIUM SERPL-SCNC: 3.8 MMOL/L — SIGNIFICANT CHANGE UP (ref 3.5–5.3)
PROTHROM AB SERPL-ACNC: 12.8 SEC — HIGH (ref 9.8–12.7)
RBC # BLD: 3.41 M/UL — LOW (ref 3.8–5.2)
RBC # FLD: 15.7 % — SIGNIFICANT CHANGE UP (ref 10.3–16.9)
SODIUM SERPL-SCNC: 137 MMOL/L — SIGNIFICANT CHANGE UP (ref 135–145)
SURGICAL PATHOLOGY STUDY: SIGNIFICANT CHANGE UP
WBC # BLD: 9.6 K/UL — SIGNIFICANT CHANGE UP (ref 3.8–10.5)
WBC # FLD AUTO: 9.6 K/UL — SIGNIFICANT CHANGE UP (ref 3.8–10.5)

## 2017-11-03 PROCEDURE — 71010: CPT | Mod: 26

## 2017-11-03 RX ORDER — LISINOPRIL 2.5 MG/1
5 TABLET ORAL DAILY
Qty: 0 | Refills: 0 | Status: DISCONTINUED | OUTPATIENT
Start: 2017-11-03 | End: 2017-11-05

## 2017-11-03 RX ORDER — ACETYLCYSTEINE 200 MG/ML
3 VIAL (ML) MISCELLANEOUS EVERY 6 HOURS
Qty: 0 | Refills: 0 | Status: DISCONTINUED | OUTPATIENT
Start: 2017-11-03 | End: 2017-11-05

## 2017-11-03 RX ORDER — ACETYLCYSTEINE 200 MG/ML
3 VIAL (ML) MISCELLANEOUS EVERY 6 HOURS
Qty: 0 | Refills: 0 | Status: DISCONTINUED | OUTPATIENT
Start: 2017-11-03 | End: 2017-11-03

## 2017-11-03 RX ORDER — POTASSIUM CHLORIDE 20 MEQ
20 PACKET (EA) ORAL ONCE
Qty: 0 | Refills: 0 | Status: COMPLETED | OUTPATIENT
Start: 2017-11-03 | End: 2017-11-03

## 2017-11-03 RX ADMIN — FAMOTIDINE 20 MILLIGRAM(S): 10 INJECTION INTRAVENOUS at 17:01

## 2017-11-03 RX ADMIN — OXYCODONE HYDROCHLORIDE 5 MILLIGRAM(S): 5 TABLET ORAL at 16:03

## 2017-11-03 RX ADMIN — LISINOPRIL 5 MILLIGRAM(S): 2.5 TABLET ORAL at 12:34

## 2017-11-03 RX ADMIN — Medication 15 MILLIGRAM(S): at 17:01

## 2017-11-03 RX ADMIN — Medication 100 MILLIGRAM(S): at 13:33

## 2017-11-03 RX ADMIN — MONTELUKAST 10 MILLIGRAM(S): 4 TABLET, CHEWABLE ORAL at 21:43

## 2017-11-03 RX ADMIN — SODIUM CHLORIDE 3 MILLILITER(S): 9 INJECTION INTRAMUSCULAR; INTRAVENOUS; SUBCUTANEOUS at 20:24

## 2017-11-03 RX ADMIN — Medication 100 MILLIGRAM(S): at 21:43

## 2017-11-03 RX ADMIN — SODIUM CHLORIDE 3 MILLILITER(S): 9 INJECTION INTRAMUSCULAR; INTRAVENOUS; SUBCUTANEOUS at 07:20

## 2017-11-03 RX ADMIN — Medication 3 MILLILITER(S): at 12:04

## 2017-11-03 RX ADMIN — Medication 20 MILLIEQUIVALENT(S): at 09:04

## 2017-11-03 RX ADMIN — Medication 10 MILLIGRAM(S): at 07:44

## 2017-11-03 RX ADMIN — SENNA PLUS 2 TABLET(S): 8.6 TABLET ORAL at 21:43

## 2017-11-03 RX ADMIN — Medication 15 MILLIGRAM(S): at 03:04

## 2017-11-03 RX ADMIN — FAMOTIDINE 20 MILLIGRAM(S): 10 INJECTION INTRAVENOUS at 07:40

## 2017-11-03 RX ADMIN — HEPARIN SODIUM 5000 UNIT(S): 5000 INJECTION INTRAVENOUS; SUBCUTANEOUS at 07:40

## 2017-11-03 RX ADMIN — TIOTROPIUM BROMIDE 1 CAPSULE(S): 18 CAPSULE ORAL; RESPIRATORY (INHALATION) at 12:05

## 2017-11-03 RX ADMIN — Medication 15 MILLIGRAM(S): at 18:01

## 2017-11-03 RX ADMIN — OXYCODONE HYDROCHLORIDE 5 MILLIGRAM(S): 5 TABLET ORAL at 14:36

## 2017-11-03 RX ADMIN — Medication 10 MILLIGRAM(S): at 17:01

## 2017-11-03 RX ADMIN — Medication 3 MILLILITER(S): at 23:21

## 2017-11-03 RX ADMIN — SODIUM CHLORIDE 3 MILLILITER(S): 9 INJECTION INTRAMUSCULAR; INTRAVENOUS; SUBCUTANEOUS at 13:27

## 2017-11-03 RX ADMIN — ALBUTEROL 2 PUFF(S): 90 AEROSOL, METERED ORAL at 03:17

## 2017-11-03 RX ADMIN — HEPARIN SODIUM 5000 UNIT(S): 5000 INJECTION INTRAVENOUS; SUBCUTANEOUS at 21:44

## 2017-11-03 RX ADMIN — Medication 3 MILLILITER(S): at 17:00

## 2017-11-03 RX ADMIN — BUDESONIDE AND FORMOTEROL FUMARATE DIHYDRATE 2 PUFF(S): 160; 4.5 AEROSOL RESPIRATORY (INHALATION) at 21:43

## 2017-11-03 RX ADMIN — Medication 100 MILLIGRAM(S): at 07:40

## 2017-11-03 RX ADMIN — Medication 15 MILLIGRAM(S): at 04:00

## 2017-11-03 RX ADMIN — Medication 100 MILLIGRAM(S): at 01:16

## 2017-11-03 RX ADMIN — LIDOCAINE 1 PATCH: 4 CREAM TOPICAL at 02:57

## 2017-11-03 RX ADMIN — LIDOCAINE 1 PATCH: 4 CREAM TOPICAL at 12:05

## 2017-11-03 RX ADMIN — LIDOCAINE 1 PATCH: 4 CREAM TOPICAL at 21:49

## 2017-11-03 RX ADMIN — ALBUTEROL 2 PUFF(S): 90 AEROSOL, METERED ORAL at 09:04

## 2017-11-03 RX ADMIN — PANTOPRAZOLE SODIUM 40 MILLIGRAM(S): 20 TABLET, DELAYED RELEASE ORAL at 07:40

## 2017-11-03 RX ADMIN — BUDESONIDE AND FORMOTEROL FUMARATE DIHYDRATE 2 PUFF(S): 160; 4.5 AEROSOL RESPIRATORY (INHALATION) at 09:06

## 2017-11-03 RX ADMIN — OXYCODONE HYDROCHLORIDE 5 MILLIGRAM(S): 5 TABLET ORAL at 00:57

## 2017-11-03 RX ADMIN — HEPARIN SODIUM 5000 UNIT(S): 5000 INJECTION INTRAVENOUS; SUBCUTANEOUS at 13:33

## 2017-11-03 NOTE — PROGRESS NOTE ADULT - SUBJECTIVE AND OBJECTIVE BOX
Patient discussed on morning rounds with       Operation / Date:     Operation / Date: R VATS Tracheobronchoplasty on 11/1/17    SUBJECTIVE ASSESSMENT:  72y Female seen at bedside this AM.  She is doing well today though she feels that her breathing is worse today compared to yesterday with more difficulty expectorating.  She is tolerating room air and denies any acute overnight events as well as HA, AMS, CP, palpitations, SOB, n/v/d, fever.     Vital Signs Last 24 Hrs  T(C): 36.8 (03 Nov 2017 17:55), Max: 37.3 (03 Nov 2017 14:00)  T(F): 98.3 (03 Nov 2017 17:55), Max: 99.1 (03 Nov 2017 14:00)  HR: 87 (03 Nov 2017 16:56) (81 - 100)  BP: 144/69 (03 Nov 2017 16:56) (133/67 - 167/64)  BP(mean): 87 (03 Nov 2017 09:07) (87 - 96)  RR: 15 (03 Nov 2017 16:56) (14 - 16)  SpO2: 97% (03 Nov 2017 16:56) (92% - 97%)  I&O's Detail    02 Nov 2017 07:01  -  03 Nov 2017 07:00  --------------------------------------------------------  IN:    Oral Fluid: 220 mL  Total IN: 220 mL    OUT:    Chest Tube: 20 mL    Indwelling Catheter - Urethral: 150 mL    Voided: 750 mL  Total OUT: 920 mL    Total NET: -700 mL      03 Nov 2017 07:01  -  03 Nov 2017 18:03  --------------------------------------------------------  IN:  Total IN: 0 mL    OUT:    Voided: 500 mL  Total OUT: 500 mL    Total NET: -500 mL    CHEST TUBE:  No.   LETI DRAIN:  No.  EPICARDIAL WIRES:No.  TIE DOWNS: Yes.  OMER: No.    PHYSICAL EXAM:    General: OOB to chair, no acute distress.     Neurological: AAOx3, no AMS or focal deficits.     Cardiovascular: RRR, S1/S2, no m/r/g.     Respiratory: No acute distress on RA, intermittent cough.  +Expiratory wheeze in b/l lung fields, no rhonchi/rales.     Gastrointestinal: ND, NBS, non-TTP.    Extremities: Warm and well perfused, no calf ttp or edema b/l.     Vascular: Pulses 2+    Incision Sites: R VATS incisions: C/D/I    LABS:                        10.7   9.6   )-----------( 206      ( 03 Nov 2017 06:52 )             32.1       COUMADIN:  No.   PT/INR - ( 03 Nov 2017 06:52 )   PT: 12.8 sec;   INR: 1.15          PTT - ( 03 Nov 2017 06:52 )  PTT:26.9 sec    11-03    137  |  101  |  10  ----------------------------<  97  3.8   |  23  |  0.87    Ca    8.5      03 Nov 2017 07:56  Phos  2.6     11-03  Mg     2.1     11-03    MEDICATIONS  (STANDING):  acetylcysteine 20% Inhalation 3 milliLiter(s) Inhalation every 6 hours  amitriptyline 10 milliGRAM(s) Oral two times a day  buDESOnide 160 MICROgram(s)/formoterol 4.5 MICROgram(s) Inhaler 2 Puff(s) Inhalation two times a day  docusate sodium 100 milliGRAM(s) Oral three times a day  famotidine    Tablet 20 milliGRAM(s) Oral two times a day  heparin  Injectable 5000 Unit(s) SubCutaneous every 8 hours  lidocaine   Patch 1 Patch Transdermal daily  lisinopril 5 milliGRAM(s) Oral daily  montelukast 10 milliGRAM(s) Oral at bedtime  pantoprazole    Tablet 40 milliGRAM(s) Oral before breakfast  senna 2 Tablet(s) Oral at bedtime  sodium chloride 0.9% lock flush 3 milliLiter(s) IV Push every 8 hours  tiotropium 18 MICROgram(s) Capsule 1 Capsule(s) Inhalation daily    MEDICATIONS  (PRN):  acetaminophen   Tablet. 650 milliGRAM(s) Oral every 6 hours PRN Mild Pain (1 - 3)  ALBUTerol    90 MICROgram(s) HFA Inhaler 2 Puff(s) Inhalation every 6 hours PRN Shortness of Breath and/or Wheezing  oxyCODONE    IR 5 milliGRAM(s) Oral every 6 hours PRN Severe Pain (7 - 10)      RADIOLOGY & ADDITIONAL TESTS:  < from: Xray Chest 1 View AP -PORTABLE-Routine (11.03.17 @ 06:46) >  EXAM:  XR CHEST 1 VIEW PORT ROUTINE                          PROCEDURE DATE:  11/03/2017           INTERPRETATION:    PORTABLE CHEST X-RAY    Indication: sob    Bedside examination of the chest dated 11/3/2017 6:46 AM is compared to   theprevious study of 11/2/2017.    Findings: Increased right-sided infiltrate. There is again discoid   atelectasis left midlung. No pleural effusion.    Impression: Increased right-sided infiltrate. This could represent   infection versus asymmetric pulmonary edema.      < end of copied text >

## 2017-11-04 ENCOUNTER — TRANSCRIPTION ENCOUNTER (OUTPATIENT)
Age: 72
End: 2017-11-04

## 2017-11-04 LAB
ANION GAP SERPL CALC-SCNC: 13 MMOL/L — SIGNIFICANT CHANGE UP (ref 5–17)
APPEARANCE UR: CLEAR — SIGNIFICANT CHANGE UP
BILIRUB UR-MCNC: NEGATIVE — SIGNIFICANT CHANGE UP
BUN SERPL-MCNC: 9 MG/DL — SIGNIFICANT CHANGE UP (ref 7–23)
CALCIUM SERPL-MCNC: 9 MG/DL — SIGNIFICANT CHANGE UP (ref 8.4–10.5)
CHLORIDE SERPL-SCNC: 99 MMOL/L — SIGNIFICANT CHANGE UP (ref 96–108)
CO2 SERPL-SCNC: 24 MMOL/L — SIGNIFICANT CHANGE UP (ref 22–31)
COLOR SPEC: YELLOW — SIGNIFICANT CHANGE UP
CREAT SERPL-MCNC: 0.85 MG/DL — SIGNIFICANT CHANGE UP (ref 0.5–1.3)
DIFF PNL FLD: NEGATIVE — SIGNIFICANT CHANGE UP
GLUCOSE SERPL-MCNC: 121 MG/DL — HIGH (ref 70–99)
GLUCOSE UR QL: NEGATIVE — SIGNIFICANT CHANGE UP
HCT VFR BLD CALC: 33.8 % — LOW (ref 34.5–45)
HGB BLD-MCNC: 11.2 G/DL — LOW (ref 11.5–15.5)
KETONES UR-MCNC: NEGATIVE — SIGNIFICANT CHANGE UP
LEUKOCYTE ESTERASE UR-ACNC: NEGATIVE — SIGNIFICANT CHANGE UP
MAGNESIUM SERPL-MCNC: 1.9 MG/DL — SIGNIFICANT CHANGE UP (ref 1.6–2.6)
MCHC RBC-ENTMCNC: 31.1 PG — SIGNIFICANT CHANGE UP (ref 27–34)
MCHC RBC-ENTMCNC: 33.1 G/DL — SIGNIFICANT CHANGE UP (ref 32–36)
MCV RBC AUTO: 93.9 FL — SIGNIFICANT CHANGE UP (ref 80–100)
NITRITE UR-MCNC: NEGATIVE — SIGNIFICANT CHANGE UP
PH UR: 7.5 — SIGNIFICANT CHANGE UP (ref 5–8)
PLATELET # BLD AUTO: 220 K/UL — SIGNIFICANT CHANGE UP (ref 150–400)
POTASSIUM SERPL-MCNC: 3.9 MMOL/L — SIGNIFICANT CHANGE UP (ref 3.5–5.3)
POTASSIUM SERPL-SCNC: 3.9 MMOL/L — SIGNIFICANT CHANGE UP (ref 3.5–5.3)
PROT UR-MCNC: NEGATIVE MG/DL — SIGNIFICANT CHANGE UP
RBC # BLD: 3.6 M/UL — LOW (ref 3.8–5.2)
RBC # FLD: 15.3 % — SIGNIFICANT CHANGE UP (ref 10.3–16.9)
SODIUM SERPL-SCNC: 136 MMOL/L — SIGNIFICANT CHANGE UP (ref 135–145)
SP GR SPEC: <=1.005 — SIGNIFICANT CHANGE UP (ref 1–1.03)
UROBILINOGEN FLD QL: 0.2 E.U./DL — SIGNIFICANT CHANGE UP
WBC # BLD: 9.4 K/UL — SIGNIFICANT CHANGE UP (ref 3.8–10.5)
WBC # FLD AUTO: 9.4 K/UL — SIGNIFICANT CHANGE UP (ref 3.8–10.5)

## 2017-11-04 PROCEDURE — 71010: CPT | Mod: 26

## 2017-11-04 RX ORDER — ACETAMINOPHEN 500 MG
1000 TABLET ORAL ONCE
Qty: 0 | Refills: 0 | Status: COMPLETED | OUTPATIENT
Start: 2017-11-04 | End: 2017-11-04

## 2017-11-04 RX ORDER — MAGNESIUM OXIDE 400 MG ORAL TABLET 241.3 MG
800 TABLET ORAL ONCE
Qty: 0 | Refills: 0 | Status: COMPLETED | OUTPATIENT
Start: 2017-11-04 | End: 2017-11-04

## 2017-11-04 RX ORDER — SODIUM CHLORIDE 9 MG/ML
1000 INJECTION INTRAMUSCULAR; INTRAVENOUS; SUBCUTANEOUS
Qty: 0 | Refills: 0 | Status: DISCONTINUED | OUTPATIENT
Start: 2017-11-04 | End: 2017-11-05

## 2017-11-04 RX ORDER — POTASSIUM CHLORIDE 20 MEQ
20 PACKET (EA) ORAL ONCE
Qty: 0 | Refills: 0 | Status: COMPLETED | OUTPATIENT
Start: 2017-11-04 | End: 2017-11-04

## 2017-11-04 RX ORDER — AMLODIPINE BESYLATE 2.5 MG/1
5 TABLET ORAL DAILY
Qty: 0 | Refills: 0 | Status: DISCONTINUED | OUTPATIENT
Start: 2017-11-04 | End: 2017-11-05

## 2017-11-04 RX ADMIN — SENNA PLUS 2 TABLET(S): 8.6 TABLET ORAL at 21:12

## 2017-11-04 RX ADMIN — LIDOCAINE 1 PATCH: 4 CREAM TOPICAL at 11:46

## 2017-11-04 RX ADMIN — Medication 20 MILLIEQUIVALENT(S): at 18:09

## 2017-11-04 RX ADMIN — Medication 3 MILLILITER(S): at 12:24

## 2017-11-04 RX ADMIN — BUDESONIDE AND FORMOTEROL FUMARATE DIHYDRATE 2 PUFF(S): 160; 4.5 AEROSOL RESPIRATORY (INHALATION) at 07:36

## 2017-11-04 RX ADMIN — Medication 400 MILLIGRAM(S): at 12:59

## 2017-11-04 RX ADMIN — SODIUM CHLORIDE 3 MILLILITER(S): 9 INJECTION INTRAMUSCULAR; INTRAVENOUS; SUBCUTANEOUS at 00:25

## 2017-11-04 RX ADMIN — Medication 3 MILLILITER(S): at 18:14

## 2017-11-04 RX ADMIN — AMLODIPINE BESYLATE 5 MILLIGRAM(S): 2.5 TABLET ORAL at 18:10

## 2017-11-04 RX ADMIN — TIOTROPIUM BROMIDE 1 CAPSULE(S): 18 CAPSULE ORAL; RESPIRATORY (INHALATION) at 11:48

## 2017-11-04 RX ADMIN — OXYCODONE HYDROCHLORIDE 5 MILLIGRAM(S): 5 TABLET ORAL at 21:40

## 2017-11-04 RX ADMIN — OXYCODONE HYDROCHLORIDE 5 MILLIGRAM(S): 5 TABLET ORAL at 01:39

## 2017-11-04 RX ADMIN — Medication 100 MILLIGRAM(S): at 14:29

## 2017-11-04 RX ADMIN — SODIUM CHLORIDE 3 MILLILITER(S): 9 INJECTION INTRAMUSCULAR; INTRAVENOUS; SUBCUTANEOUS at 21:15

## 2017-11-04 RX ADMIN — OXYCODONE HYDROCHLORIDE 5 MILLIGRAM(S): 5 TABLET ORAL at 07:42

## 2017-11-04 RX ADMIN — MONTELUKAST 10 MILLIGRAM(S): 4 TABLET, CHEWABLE ORAL at 21:11

## 2017-11-04 RX ADMIN — BUDESONIDE AND FORMOTEROL FUMARATE DIHYDRATE 2 PUFF(S): 160; 4.5 AEROSOL RESPIRATORY (INHALATION) at 07:37

## 2017-11-04 RX ADMIN — Medication 100 MILLIGRAM(S): at 05:36

## 2017-11-04 RX ADMIN — FAMOTIDINE 20 MILLIGRAM(S): 10 INJECTION INTRAVENOUS at 05:36

## 2017-11-04 RX ADMIN — HEPARIN SODIUM 5000 UNIT(S): 5000 INJECTION INTRAVENOUS; SUBCUTANEOUS at 21:11

## 2017-11-04 RX ADMIN — MAGNESIUM OXIDE 400 MG ORAL TABLET 800 MILLIGRAM(S): 241.3 TABLET ORAL at 18:09

## 2017-11-04 RX ADMIN — HEPARIN SODIUM 5000 UNIT(S): 5000 INJECTION INTRAVENOUS; SUBCUTANEOUS at 14:29

## 2017-11-04 RX ADMIN — BUDESONIDE AND FORMOTEROL FUMARATE DIHYDRATE 2 PUFF(S): 160; 4.5 AEROSOL RESPIRATORY (INHALATION) at 21:19

## 2017-11-04 RX ADMIN — SODIUM CHLORIDE 3 MILLILITER(S): 9 INJECTION INTRAMUSCULAR; INTRAVENOUS; SUBCUTANEOUS at 14:05

## 2017-11-04 RX ADMIN — Medication 10 MILLIGRAM(S): at 18:11

## 2017-11-04 RX ADMIN — Medication 10 MILLIGRAM(S): at 05:36

## 2017-11-04 RX ADMIN — HEPARIN SODIUM 5000 UNIT(S): 5000 INJECTION INTRAVENOUS; SUBCUTANEOUS at 05:38

## 2017-11-04 RX ADMIN — LISINOPRIL 5 MILLIGRAM(S): 2.5 TABLET ORAL at 05:37

## 2017-11-04 RX ADMIN — FAMOTIDINE 20 MILLIGRAM(S): 10 INJECTION INTRAVENOUS at 18:10

## 2017-11-04 RX ADMIN — Medication 3 MILLILITER(S): at 05:39

## 2017-11-04 RX ADMIN — Medication 100 MILLIGRAM(S): at 21:12

## 2017-11-04 RX ADMIN — OXYCODONE HYDROCHLORIDE 5 MILLIGRAM(S): 5 TABLET ORAL at 21:11

## 2017-11-04 RX ADMIN — LIDOCAINE 1 PATCH: 4 CREAM TOPICAL at 23:11

## 2017-11-04 RX ADMIN — PANTOPRAZOLE SODIUM 40 MILLIGRAM(S): 20 TABLET, DELAYED RELEASE ORAL at 09:27

## 2017-11-04 NOTE — PROGRESS NOTE ADULT - ASSESSMENT
72y F with history of diverticulosis, collagenous colitis, Emmanuel's esophagus, asthmatic bronchitis, GERD/LRPD and PND syndrome referred by Dr. 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  She was evaluated by Dr. Ramirez as an outpatient, completed pre-surgical evaluation and was admitted to [facility] on 11/1/17 where she underwent elective R VATS Tracheobronchoplasty which was uncomplicated.  She arrived extubated to CTICU, remained stable overnight and was transferred to step down care on POD 1 after chest tube removal in AM. She remained HD stable overnight. Today she is POD#3. Today patient had temp of 100.8 with rigors and chills. Pan cultured.       Neurovascular: pain well controlled.   - continue Tylenol, Oxycodone and lidocaine patch for pain     Respiratory: Saturating well on RA; POD 3 s/p VATS Tracheobronchoplasty; hx of asthma still with rhonchi and secretions.   - CXR in AM stable   - Encourage IS and ambulation   - continue albuterol, symbicort,   - continue Singulair   - continue mucomyst and amtriptoline for TBM  - Accapella valve at bedside  - conitnue Chest PT     Cardiovascular: History of HTN  -Resumed Lisinopril. Still hypertensive this afternoon. Started on Norvasc.    -Monitor HR/BP/Tele    GI: No active issues.   -GI PPX: Pepcid   -Colace and senna    /Renal: BUN/Cr: 9/0.85   -Trend AM Cr  -Monitor I/O    ID: Fever to 100.8 WBC wnl and stable. UA negative. pending sputum and blood cultures.   - pan cultured   - WCC 9.4, continue to trend on AM labs    Endo:   -C/w ISS for glycemic control  -Trend FS    Heme: H/H 11.2/33.8  - HSQ [id number] and SCDs  - follow up CBC     Dispo: Home when medically ready.
72y F with history of diverticulosis, collagenous colitis, Emmanuel's esophagus, asthmatic bronchitis, GERD/LRPD and PND syndrome referred by Dr. 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  She was evaluated by Dr. Ramirez as an outpatient, completed pre-surgical evaluation and was admitted to [facility] on 11/1/17 where she underwent elective R VATS Tracheobronchoplasty which was uncomplicated.  She arrived extubated to CTICU, remained stable overnight and was transferred to step down care on POD 1 after chest tube removal in AM. She remained HD stable overnight, now POD 2.        Neurovascular: No delirium, pain well controlled on current regimen.  -C/w PRNs for pain control    Respiratory: Saturating well on RA; POD 2 s/p VATS Tracheobronchoplasty; hx of asthma  -Chest tube out in AM, f/u CXR stable.  -CXR in AM  -Encourage IS 10x/hour while awake; C+DB; Ambulation  -Nebs and Inhalers PRN  -C/w Mucomyst   -Inspiratory muscle  provided by RT today.   -Monitor SpO2 for respiratory status    Cardiovascular: HD Stable, HR controlled. Hx of HTN, HLD  -Resume home antihypertensive medications (lisinopril 5mg QD) when medically appropriate. Currently holding for risk of hypotension.   -Monitor HR/BP/Tele    GI: Stable, tolerating PO  -GI PPX: Pepcid   -Colace and senna    /Renal: BUN/Cr: 10/0.87; No urinary issues  -Trend AM Cr  -Monitor I/O    ID: Afebrile, Asymptomatic  -Complete periop abx  -WCC 9.6, continue to trend on AM labs  -No acute issues at this time, continue to monitor for SIRS    Endo:   -C/w ISS for glycemic control  -Trend FS    Heme: H/H Stable  -DVT PPX: HSQ [id number] and SCDs  -CBC, chem in AM    Dispo: Home when medically ready.
72y F with history of diverticulosis, collagenous colitis, Emmanuel's esophagus, asthmatic bronchitis, GERD/LRPD and PND syndrome referred by Dr. 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  She was evaluated by Dr. Ramirez as an outpatient, completed pre-surgical evaluation and was admitted to [facility] on 11/1/17 where she underwent elective R VATS Tracheobronchoplasty which was uncomplicated.  She arrived extubated to CTICU, remained stable overnight and is now ready for transfer to step down care on POD 1 after chest tube removal in AM.        Neurovascular: No delirium, pain well controlled on current regimen.  -C/w PRNs for pain control    Respiratory: Saturating well on RA; POD 1 s/p VATS Tracheobronchoplasty; hx of asthma  -Chest tube out in AM, f/u CXR stable.  -CXR in AM  -Encourage IS 10x/hour while awake; C+DB; Ambulation  -Nebs and Inhalers PRN  -Monitor SpO2 for respiratory status    Cardiovascular: HD Stable, HR controlled. Hx of HTN, HLD  -Resume home antihypertensive medications (lisinopril 5mg QD) when medically appropriate. Currently holding for risk of hypotension.   -Monitor HR/BP/Tele    GI: Stable, tolerating PO  -GI PPX: Pepcid   -Colace and senna    /Renal: BUN/Cr: 7/0.74; No urinary issues  -Trend AM Cr  -Monitor I/O    ID: Afebrile, Asymptomatic  -Complete periop abx  -WCC 12.9, continue to trend on AM labs  -No acute issues at this time, continue to monitor for SIRS    Endo:   -C/w ISS for glycemic control  -Trend FS    Heme: H/H Stable  -DVT PPX: HSQ [id number] and SCDs  -CBC, chem in AM    Dispo: Home when medically ready.

## 2017-11-04 NOTE — DISCHARGE NOTE ADULT - CARE PROVIDER_API CALL
Matt Ramirez (MD), Surgery; Thoracic Surgery  [address]  [address]  [place], [place] [address]  Phone: [phone]  Fax: [phone]

## 2017-11-04 NOTE — DISCHARGE NOTE ADULT - HOSPITAL COURSE
72y F with history of diverticulosis, collagenous colitis, Emmanuel's esophagus, asthmatic bronchitis, GERD/LRPD and PND syndrome referred by Dr. 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  She was evaluated by Dr. Ramirez as an outpatient, completed pre-surgical evaluation and was admitted to [facility] on 11/1/17 where she underwent elective R VATS Tracheobronchoplasty which was uncomplicated.  She arrived extubated to CTICU, remained stable overnight and was transferred to step down care on POD 1 after chest tube removal in AM. She remained HD stable overnight. On POD#3, patient had temp of 100.8 with rigors and chills. Her WBC was wnl. She was pan cultured and cultures came back negative for infection. On POD#4 patient improved clinically and reports feeling much better. She has remained afebrile overnight and wbc remains wnl. As per Dr. Ramirez patient is medically ready for discharge home. Instructions for discharge gone over at length and all questions have been answered.

## 2017-11-04 NOTE — DISCHARGE NOTE ADULT - PATIENT PORTAL LINK FT
“You can access the Follow[employer] Patient Portal, offered by [facility], by registering with the following website: [email or web address]”

## 2017-11-04 NOTE — DISCHARGE NOTE ADULT - PLAN OF CARE
Recover from Surgery -Please follow up with Dr. Ramirez on Thursday 11/9/17. The office will call you tomorrow to set up a time.  The office is located at [facility], [facility], 4th floor. Call us with any questions #[phone].    -Walk daily as tolerated and use your incentive spirometer every hour.    -No driving or strenuous activity/exercise for 6 weeks, or until cleared by your surgeon.    -Gently clean your incisions with anti-bacterial soap and water, pat dry.  You may leave them open to air.    -Call your doctor if you have shortness of breath, chest pain not relieved by pain medication, dizziness, fever >101.5, or increased redness or drainage from incisions.

## 2017-11-04 NOTE — DISCHARGE NOTE ADULT - CARE PLAN
Principal Discharge DX:	Tracheobronchomalacia  Goal:	Recover from Surgery  Instructions for follow-up, activity and diet:	-Please follow up with Dr. Ramirez on Thursday 11/9/17. The office will call you tomorrow to set up a time.  The office is located at [facility], [facility], 4th floor. Call us with any questions #[phone].    -Walk daily as tolerated and use your incentive spirometer every hour.    -No driving or strenuous activity/exercise for 6 weeks, or until cleared by your surgeon.    -Gently clean your incisions with anti-bacterial soap and water, pat dry.  You may leave them open to air.    -Call your doctor if you have shortness of breath, chest pain not relieved by pain medication, dizziness, fever >101.5, or increased redness or drainage from incisions.

## 2017-11-04 NOTE — DISCHARGE NOTE ADULT - MEDICATION SUMMARY - MEDICATIONS TO TAKE
I will START or STAY ON the medications listed below when I get home from the hospital:    acetaminophen 325 mg oral tablet  -- 2 tab(s) by mouth every 6 hours, As needed, Mild Pain (1 - 3)  -- Indication: For Pain control     oxyCODONE 5 mg oral tablet  -- 1 tab(s) by mouth every 6 hours, As needed, Severe Pain (7 - 10) MDD:4  -- Indication: For moderate to severe pain control     lisinopril 5 mg oral tablet  -- 1 tab(s) by mouth once a day  -- Indication: For blood pressure medication     Elavil 10 mg oral tablet  -- 1 tab(s) by mouth 2 times a day  -- Indication: For for TBM    Symbicort 160 mcg-4.5 mcg/inh inhalation aerosol  -- 2 puff(s) inhaled 2 times a day  -- Indication: For breathing treatment     Spiriva 18 mcg inhalation capsule  -- 1 cap(s) inhaled once a day  -- Indication: For breathing treatmetn     amLODIPine 5 mg oral tablet  -- 1 tab(s) by mouth once a day  -- Indication: For blood pressure medication    raNITIdine 150 mg oral capsule  -- 1 cap(s) by mouth 2 times a day, As Needed  -- Indication: For Acid reducer    docusate sodium 100 mg oral capsule  -- 1 cap(s) by mouth 3 times a day  -- Indication: For constipation as needed    senna oral tablet  -- 2 tab(s) by mouth once a day (at bedtime)  -- Indication: For constipation as needed    montelukast 10 mg oral tablet  -- 1 tab(s) by mouth once a day  -- Indication: For breathing treatmetn     pantoprazole 40 mg oral delayed release tablet  -- 1 tab(s) by mouth once a day  -- Indication: For Acid reducer

## 2017-11-04 NOTE — PROGRESS NOTE ADULT - SUBJECTIVE AND OBJECTIVE BOX
Patient discussed on morning rounds with Dr. Ramirez     Operation / Date: 11/1 tracheobronchoplasty     SUBJECTIVE ASSESSMENT:  72y Female reports that her cough has improved slightly but that she is still coughing up yellow-jae mucus.. In the afternoon, she reports chills and rigors. Temperature at this time was 100.8. She is reports no dysuria, abdominal pain, diarrhea, n/v. Denies chest pain. Still reports incisional pain         Vital Signs Last 24 Hrs  T(C): 37.8 (04 Nov 2017 13:54), Max: 37.8 (04 Nov 2017 13:54)  T(F): 100 (04 Nov 2017 13:54), Max: 100 (04 Nov 2017 13:54)  HR: 100 (04 Nov 2017 12:00) (75 - 100)  BP: 165/75 (04 Nov 2017 12:00) (134/- - 171/82)  BP(mean): 108 (04 Nov 2017 12:00) (62 - 119)  RR: 17 (04 Nov 2017 12:00) (15 - 17)  SpO2: 94% (04 Nov 2017 12:00) (94% - 97%)  I&O's Detail    03 Nov 2017 07:01  -  04 Nov 2017 07:00  --------------------------------------------------------  IN:  Total IN: 0 mL    OUT:    Voided: 1500 mL  Total OUT: 1500 mL    Total NET: -1500 mL      04 Nov 2017 08:01  -  04 Nov 2017 16:28  --------------------------------------------------------  IN:    IV PiggyBack: 100 mL  Total IN: 100 mL    OUT:  Total OUT: 0 mL    Total NET: 100 mL          CHEST TUBE:  No.   LETI DRAIN: No.  EPICARDIAL WIRES:No.  TIE DOWNS: Yes.  OMER: No.    PHYSICAL EXAM:    General: AOx3 in no acute distress. Resting comfortably in chair.    Neurological: No focal neuro deficit. No speech or gait abnormality.     Cardiovascular: RRR no murmurs rubs or gallops.     Respiratory: bilateral rhonchi with mild expiratory wheeze. No rales.     Gastrointestinal: soft non tender non distended normal bowel sounds.     Extremities: WWP no lower extremity edema.     Vascular: 2+ DP and radial pulses bilaterally     Incision Sites: Right VATs incision c/d/i     LABS:                        11.2   9.4   )-----------( 220      ( 04 Nov 2017 10:39 )             33.8       COUMADIN:  No    PT/INR - ( 03 Nov 2017 06:52 )   PT: 12.8 sec;   INR: 1.15          PTT - ( 03 Nov 2017 06:52 )  PTT:26.9 sec    11-04    136  |  99  |  9   ----------------------------<  121<H>  3.9   |  24  |  0.85    Ca    9.0      04 Nov 2017 10:39  Phos  2.6     11-03  Mg     1.9     11-04        Urinalysis Basic - ( 04 Nov 2017 15:38 )    Color: Yellow / Appearance: Clear / SG: <=1.005 / pH: x  Gluc: x / Ketone: NEGATIVE  / Bili: Negative / Urobili: 0.2 E.U./dL   Blood: x / Protein: NEGATIVE mg/dL / Nitrite: NEGATIVE   Leuk Esterase: NEGATIVE / RBC: x / WBC x   Sq Epi: x / Non Sq Epi: x / Bacteria: x        MEDICATIONS  (STANDING):  acetylcysteine 20% Inhalation 3 milliLiter(s) Inhalation every 6 hours  amitriptyline 10 milliGRAM(s) Oral two times a day  buDESOnide 160 MICROgram(s)/formoterol 4.5 MICROgram(s) Inhaler 2 Puff(s) Inhalation two times a day  docusate sodium 100 milliGRAM(s) Oral three times a day  famotidine    Tablet 20 milliGRAM(s) Oral two times a day  heparin  Injectable 5000 Unit(s) SubCutaneous every 8 hours  lidocaine   Patch 1 Patch Transdermal daily  lisinopril 5 milliGRAM(s) Oral daily  montelukast 10 milliGRAM(s) Oral at bedtime  pantoprazole    Tablet 40 milliGRAM(s) Oral before breakfast  senna 2 Tablet(s) Oral at bedtime  sodium chloride 0.9% lock flush 3 milliLiter(s) IV Push every 8 hours  tiotropium 18 MICROgram(s) Capsule 1 Capsule(s) Inhalation daily    MEDICATIONS  (PRN):  acetaminophen   Tablet. 650 milliGRAM(s) Oral every 6 hours PRN Mild Pain (1 - 3)  ALBUTerol    90 MICROgram(s) HFA Inhaler 2 Puff(s) Inhalation every 6 hours PRN Shortness of Breath and/or Wheezing  oxyCODONE    IR 5 milliGRAM(s) Oral every 6 hours PRN Severe Pain (7 - 10)        RADIOLOGY & ADDITIONAL TESTS:    < from: Xray Chest 1 View AP -PORTABLE-Routine (11.04.17 @ 07:18) >  INTERPRETATION:  Clinical History: Shortness of breath    Portable examination chest demonstrates limited inspiration. Congestion   and/or infiltrates have. Visualized osseous structures are within normal   limits.    Impression: Limited inspiration. Congestion and/or infiltrates    < end of copied text >

## 2017-11-05 VITALS
HEART RATE: 82 BPM | RESPIRATION RATE: 22 BRPM | OXYGEN SATURATION: 96 % | DIASTOLIC BLOOD PRESSURE: 67 MMHG | SYSTOLIC BLOOD PRESSURE: 144 MMHG

## 2017-11-05 LAB
ANION GAP SERPL CALC-SCNC: 14 MMOL/L — SIGNIFICANT CHANGE UP (ref 5–17)
BASOPHILS NFR BLD AUTO: 0.3 % — SIGNIFICANT CHANGE UP (ref 0–2)
BUN SERPL-MCNC: 9 MG/DL — SIGNIFICANT CHANGE UP (ref 7–23)
CALCIUM SERPL-MCNC: 8.8 MG/DL — SIGNIFICANT CHANGE UP (ref 8.4–10.5)
CHLORIDE SERPL-SCNC: 99 MMOL/L — SIGNIFICANT CHANGE UP (ref 96–108)
CO2 SERPL-SCNC: 24 MMOL/L — SIGNIFICANT CHANGE UP (ref 22–31)
CREAT SERPL-MCNC: 0.77 MG/DL — SIGNIFICANT CHANGE UP (ref 0.5–1.3)
CULTURE RESULTS: NO GROWTH — SIGNIFICANT CHANGE UP
EOSINOPHIL NFR BLD AUTO: 6 % — SIGNIFICANT CHANGE UP (ref 0–6)
GLUCOSE SERPL-MCNC: 97 MG/DL — SIGNIFICANT CHANGE UP (ref 70–99)
GRAM STN FLD: SIGNIFICANT CHANGE UP
HCT VFR BLD CALC: 33.3 % — LOW (ref 34.5–45)
HGB BLD-MCNC: 11 G/DL — LOW (ref 11.5–15.5)
LYMPHOCYTES # BLD AUTO: 20.3 % — SIGNIFICANT CHANGE UP (ref 13–44)
MAGNESIUM SERPL-MCNC: 2 MG/DL — SIGNIFICANT CHANGE UP (ref 1.6–2.6)
MCHC RBC-ENTMCNC: 31.5 PG — SIGNIFICANT CHANGE UP (ref 27–34)
MCHC RBC-ENTMCNC: 33 G/DL — SIGNIFICANT CHANGE UP (ref 32–36)
MCV RBC AUTO: 95.4 FL — SIGNIFICANT CHANGE UP (ref 80–100)
MONOCYTES NFR BLD AUTO: 9.9 % — SIGNIFICANT CHANGE UP (ref 2–14)
NEUTROPHILS NFR BLD AUTO: 63.5 % — SIGNIFICANT CHANGE UP (ref 43–77)
PHOSPHATE SERPL-MCNC: 3.2 MG/DL — SIGNIFICANT CHANGE UP (ref 2.5–4.5)
PLATELET # BLD AUTO: 216 K/UL — SIGNIFICANT CHANGE UP (ref 150–400)
POTASSIUM SERPL-MCNC: 3.9 MMOL/L — SIGNIFICANT CHANGE UP (ref 3.5–5.3)
POTASSIUM SERPL-SCNC: 3.9 MMOL/L — SIGNIFICANT CHANGE UP (ref 3.5–5.3)
RBC # BLD: 3.49 M/UL — LOW (ref 3.8–5.2)
RBC # FLD: 14.9 % — SIGNIFICANT CHANGE UP (ref 10.3–16.9)
SODIUM SERPL-SCNC: 137 MMOL/L — SIGNIFICANT CHANGE UP (ref 135–145)
SPECIMEN SOURCE: SIGNIFICANT CHANGE UP
SPECIMEN SOURCE: SIGNIFICANT CHANGE UP
WBC # BLD: 8 K/UL — SIGNIFICANT CHANGE UP (ref 3.8–10.5)
WBC # FLD AUTO: 8 K/UL — SIGNIFICANT CHANGE UP (ref 3.8–10.5)

## 2017-11-05 PROCEDURE — 71010: CPT | Mod: 26

## 2017-11-05 RX ORDER — BUDESONIDE AND FORMOTEROL FUMARATE DIHYDRATE 160; 4.5 UG/1; UG/1
2 AEROSOL RESPIRATORY (INHALATION)
Qty: 1 | Refills: 0
Start: 2017-11-05

## 2017-11-05 RX ORDER — TIOTROPIUM BROMIDE 18 UG/1
1 CAPSULE ORAL; RESPIRATORY (INHALATION)
Qty: 1 | Refills: 0
Start: 2017-11-05

## 2017-11-05 RX ORDER — ACETAMINOPHEN 500 MG
2 TABLET ORAL
Qty: 56 | Refills: 0 | OUTPATIENT
Start: 2017-11-05 | End: 2017-11-12

## 2017-11-05 RX ORDER — BUDESONIDE AND FORMOTEROL FUMARATE DIHYDRATE 160; 4.5 UG/1; UG/1
2 AEROSOL RESPIRATORY (INHALATION)
Qty: 0 | Refills: 0 | COMMUNITY

## 2017-11-05 RX ORDER — TIOTROPIUM BROMIDE 18 UG/1
1 CAPSULE ORAL; RESPIRATORY (INHALATION)
Qty: 0 | Refills: 0 | COMMUNITY

## 2017-11-05 RX ORDER — PANTOPRAZOLE SODIUM 20 MG/1
1 TABLET, DELAYED RELEASE ORAL
Qty: 0 | Refills: 0 | COMMUNITY

## 2017-11-05 RX ORDER — DOCUSATE SODIUM 100 MG
1 CAPSULE ORAL
Qty: 21 | Refills: 0 | OUTPATIENT
Start: 2017-11-05 | End: 2017-11-12

## 2017-11-05 RX ORDER — PANTOPRAZOLE SODIUM 20 MG/1
1 TABLET, DELAYED RELEASE ORAL
Qty: 30 | Refills: 0
Start: 2017-11-05 | End: 2017-12-05

## 2017-11-05 RX ORDER — AMLODIPINE BESYLATE 2.5 MG/1
1 TABLET ORAL
Qty: 30 | Refills: 0
Start: 2017-11-05 | End: 2017-12-05

## 2017-11-05 RX ORDER — LISINOPRIL 2.5 MG/1
1 TABLET ORAL
Qty: 30 | Refills: 0
Start: 2017-11-05 | End: 2017-12-05

## 2017-11-05 RX ORDER — SENNA PLUS 8.6 MG/1
2 TABLET ORAL
Qty: 14 | Refills: 0 | OUTPATIENT
Start: 2017-11-05 | End: 2017-11-12

## 2017-11-05 RX ORDER — AMITRIPTYLINE HCL 25 MG
1 TABLET ORAL
Qty: 60 | Refills: 0
Start: 2017-11-05 | End: 2017-12-05

## 2017-11-05 RX ORDER — RANITIDINE HYDROCHLORIDE 150 MG/1
1 TABLET, FILM COATED ORAL
Qty: 0 | Refills: 0 | COMMUNITY

## 2017-11-05 RX ORDER — MONTELUKAST 4 MG/1
1 TABLET, CHEWABLE ORAL
Qty: 0 | Refills: 0 | COMMUNITY

## 2017-11-05 RX ORDER — RANITIDINE HYDROCHLORIDE 150 MG/1
1 TABLET, FILM COATED ORAL
Qty: 60 | Refills: 0
Start: 2017-11-05 | End: 2017-12-05

## 2017-11-05 RX ORDER — AMITRIPTYLINE HCL 25 MG
1 TABLET ORAL
Qty: 0 | Refills: 0 | COMMUNITY

## 2017-11-05 RX ORDER — MONTELUKAST 4 MG/1
1 TABLET, CHEWABLE ORAL
Qty: 30 | Refills: 0 | OUTPATIENT
Start: 2017-11-05 | End: 2017-12-05

## 2017-11-05 RX ORDER — OXYCODONE HYDROCHLORIDE 5 MG/1
1 TABLET ORAL
Qty: 28 | Refills: 0 | OUTPATIENT
Start: 2017-11-05 | End: 2017-11-12

## 2017-11-05 RX ADMIN — AMLODIPINE BESYLATE 5 MILLIGRAM(S): 2.5 TABLET ORAL at 06:11

## 2017-11-05 RX ADMIN — BUDESONIDE AND FORMOTEROL FUMARATE DIHYDRATE 2 PUFF(S): 160; 4.5 AEROSOL RESPIRATORY (INHALATION) at 09:16

## 2017-11-05 RX ADMIN — SODIUM CHLORIDE 3 MILLILITER(S): 9 INJECTION INTRAMUSCULAR; INTRAVENOUS; SUBCUTANEOUS at 05:35

## 2017-11-05 RX ADMIN — TIOTROPIUM BROMIDE 1 CAPSULE(S): 18 CAPSULE ORAL; RESPIRATORY (INHALATION) at 11:11

## 2017-11-05 RX ADMIN — Medication 1000 MILLIGRAM(S): at 08:58

## 2017-11-05 RX ADMIN — FAMOTIDINE 20 MILLIGRAM(S): 10 INJECTION INTRAVENOUS at 06:11

## 2017-11-05 RX ADMIN — LIDOCAINE 1 PATCH: 4 CREAM TOPICAL at 09:55

## 2017-11-05 RX ADMIN — Medication 100 MILLIGRAM(S): at 13:30

## 2017-11-05 RX ADMIN — Medication 650 MILLIGRAM(S): at 08:15

## 2017-11-05 RX ADMIN — HEPARIN SODIUM 5000 UNIT(S): 5000 INJECTION INTRAVENOUS; SUBCUTANEOUS at 06:11

## 2017-11-05 RX ADMIN — LISINOPRIL 5 MILLIGRAM(S): 2.5 TABLET ORAL at 06:11

## 2017-11-05 RX ADMIN — Medication 3 MILLILITER(S): at 03:57

## 2017-11-05 RX ADMIN — SODIUM CHLORIDE 3 MILLILITER(S): 9 INJECTION INTRAMUSCULAR; INTRAVENOUS; SUBCUTANEOUS at 10:18

## 2017-11-05 RX ADMIN — OXYCODONE HYDROCHLORIDE 5 MILLIGRAM(S): 5 TABLET ORAL at 09:54

## 2017-11-05 RX ADMIN — ALBUTEROL 2 PUFF(S): 90 AEROSOL, METERED ORAL at 04:10

## 2017-11-05 RX ADMIN — OXYCODONE HYDROCHLORIDE 5 MILLIGRAM(S): 5 TABLET ORAL at 04:30

## 2017-11-05 RX ADMIN — PANTOPRAZOLE SODIUM 40 MILLIGRAM(S): 20 TABLET, DELAYED RELEASE ORAL at 07:06

## 2017-11-05 RX ADMIN — Medication 100 MILLIGRAM(S): at 06:11

## 2017-11-05 RX ADMIN — OXYCODONE HYDROCHLORIDE 5 MILLIGRAM(S): 5 TABLET ORAL at 10:54

## 2017-11-05 RX ADMIN — Medication 3 MILLILITER(S): at 11:11

## 2017-11-05 RX ADMIN — LIDOCAINE 1 PATCH: 4 CREAM TOPICAL at 08:58

## 2017-11-05 RX ADMIN — HEPARIN SODIUM 5000 UNIT(S): 5000 INJECTION INTRAVENOUS; SUBCUTANEOUS at 13:30

## 2017-11-05 RX ADMIN — OXYCODONE HYDROCHLORIDE 5 MILLIGRAM(S): 5 TABLET ORAL at 03:57

## 2017-11-05 RX ADMIN — Medication 3 MILLILITER(S): at 06:12

## 2017-11-05 RX ADMIN — Medication 10 MILLIGRAM(S): at 06:11

## 2017-11-05 RX ADMIN — Medication 650 MILLIGRAM(S): at 07:53

## 2017-11-05 NOTE — PROGRESS NOTE ADULT - SUBJECTIVE AND OBJECTIVE BOX
Patient discussed on morning rounds with Dr. Ramirez     Operation / Date: 11/1 tracheobronchoplasty     Surgeon: Dr. Ramirez     Referring Physician: Denzel Saunders     [facility]:  72y F with history of diverticulosis, collagenous colitis, Emmanuel's esophagus, asthmatic bronchitis, GERD/LRPD and PND syndrome referred by Dr. 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  She was evaluated by Dr. Ramirez as an outpatient, completed pre-surgical evaluation and was admitted to [facility] on 11/1/17 where she underwent elective R VATS Tracheobronchoplasty which was uncomplicated.  She arrived extubated to CTICU, remained stable overnight and was transferred to step down care on POD 1 after chest tube removal in AM. She remained HD stable overnight. On POD#3, patient had temp of 100.8 with rigors and chills. Her WBC was wnl. She was pan cultured and cultures came back negative for infection. On POD#4 patient improved clinically and reports feeling much better. She has remained afebrile overnight and wbc remains wnl. As per Dr. Ramirez patient is medically ready for discharge home. Instructions for discharge gone over at length and all questions have been answered.         Vital Signs Last 24 Hrs  T(C): 36.8 (05 Nov 2017 09:11), Max: 38.1 (04 Nov 2017 21:01)  T(F): 98.3 (05 Nov 2017 09:11), Max: 100.6 (04 Nov 2017 21:01)  HR: 82 (05 Nov 2017 13:40) (82 - 122)  BP: 144/67 (05 Nov 2017 13:40) (122/59 - 164/71)  BP(mean): 97 (05 Nov 2017 13:40) (85 - 103)  RR: 22 (05 Nov 2017 13:40) (16 - 24)  SpO2: 96% (05 Nov 2017 13:40) (91% - 97%)  I&O's Detail    04 Nov 2017 08:01  -  05 Nov 2017 07:00  --------------------------------------------------------  IN:    IV PiggyBack: 100 mL    Oral Fluid: 680 mL  Total IN: 780 mL    OUT:    Voided: 3150 mL  Total OUT: 3150 mL    Total NET: -2370 mL      05 Nov 2017 07:01  -  05 Nov 2017 14:11  --------------------------------------------------------  IN:    Oral Fluid: 180 mL  Total IN: 180 mL    OUT:    Voided: 300 mL  Total OUT: 300 mL    Total NET: -120 mL        TIE DOWNS REMOVED: NO    PHYSICAL EXAM:    General: AOx3 in no acute distress. Resting comfortably in chair.    Neurological: No focal neuro deficit. No speech or gait abnormality.     Cardiovascular: RRR no murmurs rubs or gallops.     Respiratory: bilateral rhonchi with mild expiratory wheeze. No rales.     Gastrointestinal: soft non tender non distended normal bowel sounds.     Extremities: WWP no lower extremity edema.     Vascular: 2+ DP and radial pulses bilaterally     Incision Sites: Right VATs incision c/d/i         LABS:                        11.0   8.0   )-----------( 216      ( 05 Nov 2017 05:52 )             33.3       COUMADIN:  No.                11-05    137  |  99  |  9   ----------------------------<  97  3.9   |  24  |  0.77    Ca    8.8      05 Nov 2017 05:52  Phos  3.2     11-05  Mg     2.0     11-05        Urinalysis Basic - ( 04 Nov 2017 15:38 )    Color: Yellow / Appearance: Clear / SG: <=1.005 / pH: x  Gluc: x / Ketone: NEGATIVE  / Bili: Negative / Urobili: 0.2 E.U./dL   Blood: x / Protein: NEGATIVE mg/dL / Nitrite: NEGATIVE   Leuk Esterase: NEGATIVE / RBC: x / WBC x   Sq Epi: x / Non Sq Epi: x / Bacteria: x        MEDICATIONS  (STANDING):  acetylcysteine 20% Inhalation 3 milliLiter(s) Inhalation every 6 hours  amitriptyline 10 milliGRAM(s) Oral two times a day  amLODIPine   Tablet 5 milliGRAM(s) Oral daily  buDESOnide 160 MICROgram(s)/formoterol 4.5 MICROgram(s) Inhaler 2 Puff(s) Inhalation two times a day  docusate sodium 100 milliGRAM(s) Oral three times a day  famotidine    Tablet 20 milliGRAM(s) Oral two times a day  heparin  Injectable 5000 Unit(s) SubCutaneous every 8 hours  lidocaine   Patch 1 Patch Transdermal daily  lisinopril 5 milliGRAM(s) Oral daily  montelukast 10 milliGRAM(s) Oral at bedtime  pantoprazole    Tablet 40 milliGRAM(s) Oral before breakfast  senna 2 Tablet(s) Oral at bedtime  sodium chloride 0.9% lock flush 3 milliLiter(s) IV Push every 8 hours  sodium chloride 0.9%. 1000 milliLiter(s) (50 mL/Hr) IV Continuous <Continuous>  tiotropium 18 MICROgram(s) Capsule 1 Capsule(s) Inhalation daily      Discharge CXR:< from: Xray Chest 1 View AP -PORTABLE-Routine (11.05.17 @ 07:34) >    INTERPRETATION:  Clinical History: Follow-up    Portable examination the chest demonstrates the heart to be within normal   limits in transverse diameter. No acute infiltrates. Visualized osseous   structures are within normal limits.    Impression: No acute infiltrates    < end of copied text >

## 2017-11-06 LAB
CULTURE RESULTS: SIGNIFICANT CHANGE UP
SPECIMEN SOURCE: SIGNIFICANT CHANGE UP

## 2017-11-07 ENCOUNTER — FORM ENCOUNTER (OUTPATIENT)
Age: 72
End: 2017-11-07

## 2017-11-08 ENCOUNTER — APPOINTMENT (OUTPATIENT)
Dept: THORACIC SURGERY | Facility: CLINIC | Age: 72
End: 2017-11-08
Payer: MEDICARE

## 2017-11-08 ENCOUNTER — OUTPATIENT (OUTPATIENT)
Dept: OUTPATIENT SERVICES | Facility: HOSPITAL | Age: 72
LOS: 1 days | End: 2017-11-08
Payer: MEDICARE

## 2017-11-08 VITALS
TEMPERATURE: 98.2 F | HEART RATE: 88 BPM | SYSTOLIC BLOOD PRESSURE: 135 MMHG | WEIGHT: 146 LBS | RESPIRATION RATE: 18 BRPM | OXYGEN SATURATION: 95 % | BODY MASS INDEX: 25.86 KG/M2 | DIASTOLIC BLOOD PRESSURE: 63 MMHG

## 2017-11-08 DIAGNOSIS — G47.30 SLEEP APNEA, UNSPECIFIED: ICD-10-CM

## 2017-11-08 DIAGNOSIS — Z87.11 PERSONAL HISTORY OF PEPTIC ULCER DISEASE: ICD-10-CM

## 2017-11-08 DIAGNOSIS — Z79.899 OTHER LONG TERM (CURRENT) DRUG THERAPY: ICD-10-CM

## 2017-11-08 DIAGNOSIS — Z28.21 IMMUNIZATION NOT CARRIED OUT BECAUSE OF PATIENT REFUSAL: ICD-10-CM

## 2017-11-08 DIAGNOSIS — J45.909 UNSPECIFIED ASTHMA, UNCOMPLICATED: ICD-10-CM

## 2017-11-08 DIAGNOSIS — R05 COUGH: ICD-10-CM

## 2017-11-08 DIAGNOSIS — K22.70 BARRETT'S ESOPHAGUS WITHOUT DYSPLASIA: ICD-10-CM

## 2017-11-08 DIAGNOSIS — N20.0 CALCULUS OF KIDNEY: ICD-10-CM

## 2017-11-08 DIAGNOSIS — E78.5 HYPERLIPIDEMIA, UNSPECIFIED: ICD-10-CM

## 2017-11-08 DIAGNOSIS — I08.1 RHEUMATIC DISORDERS OF BOTH MITRAL AND TRICUSPID VALVES: ICD-10-CM

## 2017-11-08 DIAGNOSIS — K44.9 DIAPHRAGMATIC HERNIA WITHOUT OBSTRUCTION OR GANGRENE: ICD-10-CM

## 2017-11-08 DIAGNOSIS — K21.9 GASTRO-ESOPHAGEAL REFLUX DISEASE WITHOUT ESOPHAGITIS: ICD-10-CM

## 2017-11-08 DIAGNOSIS — R50.9 FEVER, UNSPECIFIED: ICD-10-CM

## 2017-11-08 DIAGNOSIS — I10 ESSENTIAL (PRIMARY) HYPERTENSION: ICD-10-CM

## 2017-11-08 DIAGNOSIS — Z86.010 PERSONAL HISTORY OF COLONIC POLYPS: ICD-10-CM

## 2017-11-08 DIAGNOSIS — J40 BRONCHITIS, NOT SPECIFIED AS ACUTE OR CHRONIC: ICD-10-CM

## 2017-11-08 DIAGNOSIS — J39.8 OTHER SPECIFIED DISEASES OF UPPER RESPIRATORY TRACT: ICD-10-CM

## 2017-11-08 DIAGNOSIS — Z79.51 LONG TERM (CURRENT) USE OF INHALED STEROIDS: ICD-10-CM

## 2017-11-08 DIAGNOSIS — Z98.41 CATARACT EXTRACTION STATUS, RIGHT EYE: ICD-10-CM

## 2017-11-08 DIAGNOSIS — Z98.41 CATARACT EXTRACTION STATUS, RIGHT EYE: Chronic | ICD-10-CM

## 2017-11-08 DIAGNOSIS — R09.82 POSTNASAL DRIP: ICD-10-CM

## 2017-11-08 DIAGNOSIS — Z90.49 ACQUIRED ABSENCE OF OTHER SPECIFIED PARTS OF DIGESTIVE TRACT: ICD-10-CM

## 2017-11-08 DIAGNOSIS — Z88.8 ALLERGY STATUS TO OTHER DRUGS, MEDICAMENTS AND BIOLOGICAL SUBSTANCES STATUS: ICD-10-CM

## 2017-11-08 DIAGNOSIS — Z90.49 ACQUIRED ABSENCE OF OTHER SPECIFIED PARTS OF DIGESTIVE TRACT: Chronic | ICD-10-CM

## 2017-11-08 PROCEDURE — 71046 X-RAY EXAM CHEST 2 VIEWS: CPT

## 2017-11-08 PROCEDURE — 71020: CPT | Mod: 26

## 2017-11-08 PROCEDURE — 99024 POSTOP FOLLOW-UP VISIT: CPT

## 2017-11-08 RX ORDER — HYDROCODONE BITARTRATE AND HOMATROPINE METHYLBROMIDE 5; 1.5 MG/5ML; MG/5ML
5-1.5 SYRUP ORAL
Qty: 473 | Refills: 0 | Status: COMPLETED | COMMUNITY
Start: 2017-10-02 | End: 2017-11-08

## 2017-11-08 RX ORDER — BENZONATATE 100 MG/1
100 CAPSULE ORAL
Qty: 20 | Refills: 0 | Status: COMPLETED | COMMUNITY
Start: 2017-10-08

## 2017-11-09 ENCOUNTER — APPOINTMENT (OUTPATIENT)
Dept: PULMONOLOGY | Facility: CLINIC | Age: 72
End: 2017-11-09
Payer: MEDICARE

## 2017-11-09 VITALS
OXYGEN SATURATION: 97 % | SYSTOLIC BLOOD PRESSURE: 110 MMHG | HEIGHT: 63 IN | BODY MASS INDEX: 25.87 KG/M2 | DIASTOLIC BLOOD PRESSURE: 80 MMHG | HEART RATE: 80 BPM | WEIGHT: 146 LBS

## 2017-11-09 LAB
CULTURE RESULTS: SIGNIFICANT CHANGE UP
SPECIMEN SOURCE: SIGNIFICANT CHANGE UP

## 2017-11-09 PROCEDURE — 99214 OFFICE O/P EST MOD 30 MIN: CPT

## 2017-11-09 RX ORDER — PREDNISONE 10 MG/1
10 TABLET ORAL
Qty: 100 | Refills: 0 | Status: DISCONTINUED | COMMUNITY
Start: 2017-10-10 | End: 2017-11-09

## 2017-11-12 ENCOUNTER — FORM ENCOUNTER (OUTPATIENT)
Age: 72
End: 2017-11-12

## 2017-11-13 ENCOUNTER — APPOINTMENT (OUTPATIENT)
Dept: THORACIC SURGERY | Facility: CLINIC | Age: 72
End: 2017-11-13
Payer: MEDICARE

## 2017-11-13 ENCOUNTER — OUTPATIENT (OUTPATIENT)
Dept: OUTPATIENT SERVICES | Facility: HOSPITAL | Age: 72
LOS: 1 days | End: 2017-11-13
Payer: MEDICARE

## 2017-11-13 VITALS
SYSTOLIC BLOOD PRESSURE: 145 MMHG | DIASTOLIC BLOOD PRESSURE: 68 MMHG | HEART RATE: 73 BPM | RESPIRATION RATE: 18 BRPM | BODY MASS INDEX: 25.51 KG/M2 | WEIGHT: 144 LBS | TEMPERATURE: 98 F | OXYGEN SATURATION: 96 %

## 2017-11-13 DIAGNOSIS — Z87.898 PERSONAL HISTORY OF OTHER SPECIFIED CONDITIONS: ICD-10-CM

## 2017-11-13 DIAGNOSIS — J45.909 UNSPECIFIED ASTHMA, UNCOMPLICATED: ICD-10-CM

## 2017-11-13 DIAGNOSIS — Z72.820 SLEEP DEPRIVATION: ICD-10-CM

## 2017-11-13 DIAGNOSIS — Z87.09 PERSONAL HISTORY OF OTHER DISEASES OF THE RESPIRATORY SYSTEM: ICD-10-CM

## 2017-11-13 DIAGNOSIS — Z90.49 ACQUIRED ABSENCE OF OTHER SPECIFIED PARTS OF DIGESTIVE TRACT: Chronic | ICD-10-CM

## 2017-11-13 DIAGNOSIS — Z98.41 CATARACT EXTRACTION STATUS, RIGHT EYE: Chronic | ICD-10-CM

## 2017-11-13 PROCEDURE — 71020: CPT | Mod: 26

## 2017-11-13 PROCEDURE — 99024 POSTOP FOLLOW-UP VISIT: CPT

## 2017-11-13 PROCEDURE — 71046 X-RAY EXAM CHEST 2 VIEWS: CPT

## 2017-11-14 ENCOUNTER — APPOINTMENT (OUTPATIENT)
Dept: PULMONOLOGY | Facility: CLINIC | Age: 72
End: 2017-11-14

## 2017-11-19 ENCOUNTER — FORM ENCOUNTER (OUTPATIENT)
Age: 72
End: 2017-11-19

## 2017-11-20 ENCOUNTER — OUTPATIENT (OUTPATIENT)
Dept: OUTPATIENT SERVICES | Facility: HOSPITAL | Age: 72
LOS: 1 days | End: 2017-11-20
Payer: MEDICARE

## 2017-11-20 ENCOUNTER — APPOINTMENT (OUTPATIENT)
Dept: THORACIC SURGERY | Facility: CLINIC | Age: 72
End: 2017-11-20
Payer: MEDICARE

## 2017-11-20 VITALS
BODY MASS INDEX: 25.33 KG/M2 | DIASTOLIC BLOOD PRESSURE: 63 MMHG | OXYGEN SATURATION: 95 % | TEMPERATURE: 97.6 F | SYSTOLIC BLOOD PRESSURE: 133 MMHG | HEART RATE: 83 BPM | WEIGHT: 143 LBS | RESPIRATION RATE: 18 BRPM

## 2017-11-20 DIAGNOSIS — Z90.49 ACQUIRED ABSENCE OF OTHER SPECIFIED PARTS OF DIGESTIVE TRACT: Chronic | ICD-10-CM

## 2017-11-20 DIAGNOSIS — Z98.41 CATARACT EXTRACTION STATUS, RIGHT EYE: Chronic | ICD-10-CM

## 2017-11-20 PROCEDURE — 71046 X-RAY EXAM CHEST 2 VIEWS: CPT

## 2017-11-20 PROCEDURE — 71020: CPT | Mod: 26

## 2017-11-20 PROCEDURE — 99024 POSTOP FOLLOW-UP VISIT: CPT

## 2017-11-20 RX ORDER — AMOXICILLIN AND CLAVULANATE POTASSIUM 875; 125 MG/1; MG/1
875-125 TABLET, COATED ORAL
Qty: 20 | Refills: 0 | Status: DISCONTINUED | COMMUNITY
Start: 2017-11-06 | End: 2017-11-20

## 2017-11-24 ENCOUNTER — MESSAGE (OUTPATIENT)
Age: 72
End: 2017-11-24

## 2017-11-28 ENCOUNTER — FORM ENCOUNTER (OUTPATIENT)
Age: 72
End: 2017-11-28

## 2017-11-28 ENCOUNTER — APPOINTMENT (OUTPATIENT)
Dept: GASTROENTEROLOGY | Facility: CLINIC | Age: 72
End: 2017-11-28
Payer: MEDICARE

## 2017-11-28 VITALS
TEMPERATURE: 98.1 F | RESPIRATION RATE: 16 BRPM | SYSTOLIC BLOOD PRESSURE: 120 MMHG | OXYGEN SATURATION: 99 % | DIASTOLIC BLOOD PRESSURE: 68 MMHG | HEIGHT: 63 IN | WEIGHT: 141 LBS | HEART RATE: 78 BPM | BODY MASS INDEX: 24.98 KG/M2

## 2017-11-28 DIAGNOSIS — Z87.19 PERSONAL HISTORY OF OTHER DISEASES OF THE DIGESTIVE SYSTEM: ICD-10-CM

## 2017-11-28 PROCEDURE — 99214 OFFICE O/P EST MOD 30 MIN: CPT

## 2017-11-29 ENCOUNTER — MESSAGE (OUTPATIENT)
Age: 72
End: 2017-11-29

## 2017-11-29 ENCOUNTER — OUTPATIENT (OUTPATIENT)
Dept: OUTPATIENT SERVICES | Facility: HOSPITAL | Age: 72
LOS: 1 days | End: 2017-11-29
Payer: MEDICARE

## 2017-11-29 ENCOUNTER — APPOINTMENT (OUTPATIENT)
Dept: CT IMAGING | Facility: CLINIC | Age: 72
End: 2017-11-29
Payer: MEDICARE

## 2017-11-29 DIAGNOSIS — R10.13 EPIGASTRIC PAIN: ICD-10-CM

## 2017-11-29 DIAGNOSIS — Z98.41 CATARACT EXTRACTION STATUS, RIGHT EYE: Chronic | ICD-10-CM

## 2017-11-29 DIAGNOSIS — Z90.49 ACQUIRED ABSENCE OF OTHER SPECIFIED PARTS OF DIGESTIVE TRACT: Chronic | ICD-10-CM

## 2017-11-29 LAB
AMYLASE/CREAT SERPL: 36 U/L
LPL SERPL-CCNC: 28 U/L

## 2017-11-29 PROCEDURE — 74177 CT ABD & PELVIS W/CONTRAST: CPT

## 2017-11-29 PROCEDURE — 74177 CT ABD & PELVIS W/CONTRAST: CPT | Mod: 26

## 2017-11-30 ENCOUNTER — MESSAGE (OUTPATIENT)
Age: 72
End: 2017-11-30

## 2017-12-06 ENCOUNTER — APPOINTMENT (OUTPATIENT)
Dept: PULMONOLOGY | Facility: CLINIC | Age: 72
End: 2017-12-06
Payer: MEDICARE

## 2017-12-06 VITALS
HEART RATE: 89 BPM | SYSTOLIC BLOOD PRESSURE: 120 MMHG | HEIGHT: 63 IN | OXYGEN SATURATION: 96 % | RESPIRATION RATE: 15 BRPM | BODY MASS INDEX: 26.93 KG/M2 | WEIGHT: 152 LBS | DIASTOLIC BLOOD PRESSURE: 70 MMHG

## 2017-12-06 PROCEDURE — 71020: CPT

## 2017-12-06 PROCEDURE — 99214 OFFICE O/P EST MOD 30 MIN: CPT | Mod: 25

## 2017-12-06 RX ORDER — PREDNISONE 20 MG/1
20 TABLET ORAL
Qty: 12 | Refills: 0 | Status: DISCONTINUED | COMMUNITY
Start: 2017-10-08 | End: 2017-12-06

## 2017-12-06 RX ORDER — LEVOFLOXACIN 750 MG/1
750 TABLET, FILM COATED ORAL
Qty: 7 | Refills: 0 | Status: DISCONTINUED | COMMUNITY
Start: 2017-10-08 | End: 2017-12-06

## 2017-12-06 RX ORDER — METHYLPREDNISOLONE 4 MG/1
4 TABLET ORAL
Qty: 21 | Refills: 0 | Status: DISCONTINUED | COMMUNITY
Start: 2017-08-17 | End: 2017-12-06

## 2017-12-06 RX ORDER — CEFUROXIME AXETIL 500 MG/1
500 TABLET ORAL
Qty: 18 | Refills: 0 | Status: DISCONTINUED | COMMUNITY
Start: 2017-10-08 | End: 2017-12-06

## 2017-12-14 LAB
ALBUMIN SERPL ELPH-MCNC: 4.1 G/DL
ALP BLD-CCNC: 69 U/L
ALT SERPL-CCNC: 20 U/L
ANION GAP SERPL CALC-SCNC: 14 MMOL/L
AST SERPL-CCNC: 22 U/L
BASOPHILS # BLD AUTO: 0.02 K/UL
BASOPHILS NFR BLD AUTO: 0.3 %
BILIRUB SERPL-MCNC: 0.4 MG/DL
BUN SERPL-MCNC: 11 MG/DL
CALCIUM SERPL-MCNC: 9.8 MG/DL
CHLORIDE SERPL-SCNC: 105 MMOL/L
CO2 SERPL-SCNC: 24 MMOL/L
CREAT SERPL-MCNC: 0.93 MG/DL
EOSINOPHIL # BLD AUTO: 0.84 K/UL
EOSINOPHIL NFR BLD AUTO: 12.1 %
GLUCOSE SERPL-MCNC: 103 MG/DL
HCT VFR BLD CALC: 39.4 %
HGB BLD-MCNC: 13.1 G/DL
IMM GRANULOCYTES NFR BLD AUTO: 0 %
LYMPHOCYTES # BLD AUTO: 2.47 K/UL
LYMPHOCYTES NFR BLD AUTO: 35.5 %
MAN DIFF?: NORMAL
MCHC RBC-ENTMCNC: 32.3 PG
MCHC RBC-ENTMCNC: 33.2 GM/DL
MCV RBC AUTO: 97.3 FL
MONOCYTES # BLD AUTO: 0.77 K/UL
MONOCYTES NFR BLD AUTO: 11.1 %
NEUTROPHILS # BLD AUTO: 2.86 K/UL
NEUTROPHILS NFR BLD AUTO: 41 %
PLATELET # BLD AUTO: 298 K/UL
POTASSIUM SERPL-SCNC: 4.5 MMOL/L
PROT SERPL-MCNC: 7.2 G/DL
RBC # BLD: 4.05 M/UL
RBC # FLD: 15.6 %
SODIUM SERPL-SCNC: 143 MMOL/L
WBC # FLD AUTO: 6.96 K/UL

## 2017-12-19 PROCEDURE — 81003 URINALYSIS AUTO W/O SCOPE: CPT

## 2017-12-19 PROCEDURE — 84100 ASSAY OF PHOSPHORUS: CPT

## 2017-12-19 PROCEDURE — 86901 BLOOD TYPING SEROLOGIC RH(D): CPT

## 2017-12-19 PROCEDURE — 82330 ASSAY OF CALCIUM: CPT

## 2017-12-19 PROCEDURE — 85610 PROTHROMBIN TIME: CPT

## 2017-12-19 PROCEDURE — 87040 BLOOD CULTURE FOR BACTERIA: CPT

## 2017-12-19 PROCEDURE — 83735 ASSAY OF MAGNESIUM: CPT

## 2017-12-19 PROCEDURE — 85018 HEMOGLOBIN: CPT

## 2017-12-19 PROCEDURE — 84295 ASSAY OF SERUM SODIUM: CPT

## 2017-12-19 PROCEDURE — 82803 BLOOD GASES ANY COMBINATION: CPT

## 2017-12-19 PROCEDURE — C1889: CPT

## 2017-12-19 PROCEDURE — S2900: CPT

## 2017-12-19 PROCEDURE — 82962 GLUCOSE BLOOD TEST: CPT

## 2017-12-19 PROCEDURE — 85730 THROMBOPLASTIN TIME PARTIAL: CPT

## 2017-12-19 PROCEDURE — C1781: CPT

## 2017-12-19 PROCEDURE — 94640 AIRWAY INHALATION TREATMENT: CPT

## 2017-12-19 PROCEDURE — 86850 RBC ANTIBODY SCREEN: CPT

## 2017-12-19 PROCEDURE — 84132 ASSAY OF SERUM POTASSIUM: CPT

## 2017-12-19 PROCEDURE — 71045 X-RAY EXAM CHEST 1 VIEW: CPT

## 2017-12-19 PROCEDURE — 85025 COMPLETE CBC W/AUTO DIFF WBC: CPT

## 2017-12-19 PROCEDURE — 88305 TISSUE EXAM BY PATHOLOGIST: CPT

## 2017-12-19 PROCEDURE — 87070 CULTURE OTHR SPECIMN AEROBIC: CPT

## 2017-12-19 PROCEDURE — 86900 BLOOD TYPING SEROLOGIC ABO: CPT

## 2017-12-19 PROCEDURE — 80048 BASIC METABOLIC PNL TOTAL CA: CPT

## 2017-12-19 PROCEDURE — 97161 PT EVAL LOW COMPLEX 20 MIN: CPT

## 2017-12-19 PROCEDURE — 36415 COLL VENOUS BLD VENIPUNCTURE: CPT

## 2017-12-19 PROCEDURE — 87086 URINE CULTURE/COLONY COUNT: CPT

## 2017-12-19 PROCEDURE — 85027 COMPLETE CBC AUTOMATED: CPT

## 2017-12-20 ENCOUNTER — FORM ENCOUNTER (OUTPATIENT)
Age: 72
End: 2017-12-20

## 2017-12-21 ENCOUNTER — OUTPATIENT (OUTPATIENT)
Dept: OUTPATIENT SERVICES | Facility: HOSPITAL | Age: 72
LOS: 1 days | End: 2017-12-21
Payer: MEDICARE

## 2017-12-21 ENCOUNTER — APPOINTMENT (OUTPATIENT)
Dept: THORACIC SURGERY | Facility: CLINIC | Age: 72
End: 2017-12-21
Payer: MEDICARE

## 2017-12-21 VITALS
BODY MASS INDEX: 25.69 KG/M2 | TEMPERATURE: 97.6 F | OXYGEN SATURATION: 96 % | WEIGHT: 145 LBS | SYSTOLIC BLOOD PRESSURE: 158 MMHG | HEART RATE: 88 BPM | DIASTOLIC BLOOD PRESSURE: 76 MMHG | RESPIRATION RATE: 19 BRPM

## 2017-12-21 DIAGNOSIS — Z87.898 PERSONAL HISTORY OF OTHER SPECIFIED CONDITIONS: ICD-10-CM

## 2017-12-21 DIAGNOSIS — G89.18 OTHER ACUTE POSTPROCEDURAL PAIN: ICD-10-CM

## 2017-12-21 DIAGNOSIS — Z87.19 PERSONAL HISTORY OF OTHER DISEASES OF THE DIGESTIVE SYSTEM: ICD-10-CM

## 2017-12-21 DIAGNOSIS — Z90.49 ACQUIRED ABSENCE OF OTHER SPECIFIED PARTS OF DIGESTIVE TRACT: Chronic | ICD-10-CM

## 2017-12-21 DIAGNOSIS — Z98.41 CATARACT EXTRACTION STATUS, RIGHT EYE: Chronic | ICD-10-CM

## 2017-12-21 PROCEDURE — 99024 POSTOP FOLLOW-UP VISIT: CPT

## 2017-12-21 PROCEDURE — 71046 X-RAY EXAM CHEST 2 VIEWS: CPT

## 2017-12-21 PROCEDURE — 71020: CPT | Mod: 26

## 2017-12-21 RX ORDER — RANITIDINE 150 MG/1
150 TABLET ORAL
Qty: 60 | Refills: 0 | Status: DISCONTINUED | COMMUNITY
Start: 2017-11-05 | End: 2017-12-21

## 2017-12-21 RX ORDER — IBUPROFEN 600 MG/1
600 TABLET, FILM COATED ORAL
Qty: 60 | Refills: 0 | Status: DISCONTINUED | COMMUNITY
Start: 2017-08-30 | End: 2017-12-21

## 2017-12-21 RX ORDER — PREDNISONE 10 MG/1
10 TABLET ORAL
Qty: 100 | Refills: 0 | Status: DISCONTINUED | COMMUNITY
Start: 2017-12-06 | End: 2017-12-21

## 2017-12-21 RX ORDER — CLARITHROMYCIN 500 MG/1
500 TABLET, FILM COATED ORAL
Qty: 20 | Refills: 0 | Status: DISCONTINUED | COMMUNITY
Start: 2017-12-06 | End: 2017-12-21

## 2017-12-22 ENCOUNTER — APPOINTMENT (OUTPATIENT)
Dept: PULMONOLOGY | Facility: CLINIC | Age: 72
End: 2017-12-22

## 2018-01-09 ENCOUNTER — APPOINTMENT (OUTPATIENT)
Dept: PULMONOLOGY | Facility: CLINIC | Age: 73
End: 2018-01-09
Payer: MEDICARE

## 2018-01-09 VITALS
DIASTOLIC BLOOD PRESSURE: 80 MMHG | SYSTOLIC BLOOD PRESSURE: 146 MMHG | OXYGEN SATURATION: 94 % | WEIGHT: 145 LBS | BODY MASS INDEX: 25.69 KG/M2 | HEIGHT: 63 IN | HEART RATE: 114 BPM | RESPIRATION RATE: 17 BRPM

## 2018-01-09 PROCEDURE — 71046 X-RAY EXAM CHEST 2 VIEWS: CPT

## 2018-01-09 PROCEDURE — 99214 OFFICE O/P EST MOD 30 MIN: CPT | Mod: 25

## 2018-01-10 ENCOUNTER — FORM ENCOUNTER (OUTPATIENT)
Age: 73
End: 2018-01-10

## 2018-01-11 ENCOUNTER — OUTPATIENT (OUTPATIENT)
Dept: OUTPATIENT SERVICES | Facility: HOSPITAL | Age: 73
LOS: 1 days | End: 2018-01-11
Payer: MEDICARE

## 2018-01-11 ENCOUNTER — APPOINTMENT (OUTPATIENT)
Dept: THORACIC SURGERY | Facility: CLINIC | Age: 73
End: 2018-01-11
Payer: MEDICARE

## 2018-01-11 VITALS
RESPIRATION RATE: 22 BRPM | OXYGEN SATURATION: 94 % | SYSTOLIC BLOOD PRESSURE: 142 MMHG | WEIGHT: 144 LBS | HEART RATE: 105 BPM | DIASTOLIC BLOOD PRESSURE: 70 MMHG | BODY MASS INDEX: 25.51 KG/M2 | TEMPERATURE: 98.6 F

## 2018-01-11 DIAGNOSIS — Z90.49 ACQUIRED ABSENCE OF OTHER SPECIFIED PARTS OF DIGESTIVE TRACT: Chronic | ICD-10-CM

## 2018-01-11 DIAGNOSIS — Z98.41 CATARACT EXTRACTION STATUS, RIGHT EYE: Chronic | ICD-10-CM

## 2018-01-11 DIAGNOSIS — Z09 ENCOUNTER FOR FOLLOW-UP EXAMINATION AFTER COMPLETED TREATMENT FOR CONDITIONS OTHER THAN MALIGNANT NEOPLASM: ICD-10-CM

## 2018-01-11 PROCEDURE — 99024 POSTOP FOLLOW-UP VISIT: CPT

## 2018-01-11 PROCEDURE — 71046 X-RAY EXAM CHEST 2 VIEWS: CPT

## 2018-01-11 PROCEDURE — 71046 X-RAY EXAM CHEST 2 VIEWS: CPT | Mod: 26

## 2018-01-12 ENCOUNTER — TRANSCRIPTION ENCOUNTER (OUTPATIENT)
Age: 73
End: 2018-01-12

## 2018-01-12 ENCOUNTER — APPOINTMENT (OUTPATIENT)
Dept: THORACIC SURGERY | Facility: CLINIC | Age: 73
End: 2018-01-12
Payer: MEDICARE

## 2018-01-12 VITALS
HEART RATE: 111 BPM | RESPIRATION RATE: 22 BRPM | TEMPERATURE: 97.1 F | OXYGEN SATURATION: 92 % | SYSTOLIC BLOOD PRESSURE: 127 MMHG | DIASTOLIC BLOOD PRESSURE: 68 MMHG

## 2018-01-12 PROCEDURE — 99024 POSTOP FOLLOW-UP VISIT: CPT

## 2018-01-18 ENCOUNTER — APPOINTMENT (OUTPATIENT)
Dept: THORACIC SURGERY | Facility: CLINIC | Age: 73
End: 2018-01-18
Payer: MEDICARE

## 2018-01-24 ENCOUNTER — FORM ENCOUNTER (OUTPATIENT)
Age: 73
End: 2018-01-24

## 2018-01-25 ENCOUNTER — APPOINTMENT (OUTPATIENT)
Dept: THORACIC SURGERY | Facility: CLINIC | Age: 73
End: 2018-01-25
Payer: MEDICARE

## 2018-01-25 ENCOUNTER — OUTPATIENT (OUTPATIENT)
Dept: OUTPATIENT SERVICES | Facility: HOSPITAL | Age: 73
LOS: 1 days | End: 2018-01-25
Payer: MEDICARE

## 2018-01-25 VITALS
BODY MASS INDEX: 25.33 KG/M2 | OXYGEN SATURATION: 93 % | WEIGHT: 143 LBS | RESPIRATION RATE: 22 BRPM | HEART RATE: 104 BPM | TEMPERATURE: 97.3 F | DIASTOLIC BLOOD PRESSURE: 76 MMHG | SYSTOLIC BLOOD PRESSURE: 168 MMHG

## 2018-01-25 DIAGNOSIS — Z98.41 CATARACT EXTRACTION STATUS, RIGHT EYE: Chronic | ICD-10-CM

## 2018-01-25 DIAGNOSIS — Z09 ENCOUNTER FOR FOLLOW-UP EXAMINATION AFTER COMPLETED TREATMENT FOR CONDITIONS OTHER THAN MALIGNANT NEOPLASM: ICD-10-CM

## 2018-01-25 DIAGNOSIS — Z90.49 ACQUIRED ABSENCE OF OTHER SPECIFIED PARTS OF DIGESTIVE TRACT: Chronic | ICD-10-CM

## 2018-01-25 PROCEDURE — 99024 POSTOP FOLLOW-UP VISIT: CPT

## 2018-01-25 PROCEDURE — 71046 X-RAY EXAM CHEST 2 VIEWS: CPT

## 2018-01-25 PROCEDURE — 71046 X-RAY EXAM CHEST 2 VIEWS: CPT | Mod: 26

## 2018-01-25 RX ORDER — AMITRIPTYLINE HCL 10 MG
10 TABLET ORAL 3 TIMES DAILY
Qty: 90 | Refills: 5 | Status: COMPLETED | COMMUNITY
End: 2018-01-25

## 2018-01-26 PROBLEM — Z09 POSTOP CHECK: Status: ACTIVE | Noted: 2018-01-26

## 2018-02-08 ENCOUNTER — APPOINTMENT (OUTPATIENT)
Dept: THORACIC SURGERY | Facility: CLINIC | Age: 73
End: 2018-02-08

## 2018-02-12 ENCOUNTER — RX RENEWAL (OUTPATIENT)
Age: 73
End: 2018-02-12

## 2018-03-01 ENCOUNTER — RX RENEWAL (OUTPATIENT)
Age: 73
End: 2018-03-01

## 2018-03-07 ENCOUNTER — FORM ENCOUNTER (OUTPATIENT)
Age: 73
End: 2018-03-07

## 2018-03-08 ENCOUNTER — APPOINTMENT (OUTPATIENT)
Dept: THORACIC SURGERY | Facility: CLINIC | Age: 73
End: 2018-03-08
Payer: MEDICARE

## 2018-03-08 ENCOUNTER — OUTPATIENT (OUTPATIENT)
Dept: OUTPATIENT SERVICES | Facility: HOSPITAL | Age: 73
LOS: 1 days | End: 2018-03-08
Payer: MEDICARE

## 2018-03-08 VITALS
RESPIRATION RATE: 18 BRPM | HEART RATE: 81 BPM | OXYGEN SATURATION: 95 % | DIASTOLIC BLOOD PRESSURE: 79 MMHG | SYSTOLIC BLOOD PRESSURE: 156 MMHG | TEMPERATURE: 98.1 F

## 2018-03-08 DIAGNOSIS — Z90.49 ACQUIRED ABSENCE OF OTHER SPECIFIED PARTS OF DIGESTIVE TRACT: Chronic | ICD-10-CM

## 2018-03-08 DIAGNOSIS — Z98.41 CATARACT EXTRACTION STATUS, RIGHT EYE: Chronic | ICD-10-CM

## 2018-03-08 PROCEDURE — 71046 X-RAY EXAM CHEST 2 VIEWS: CPT | Mod: 26

## 2018-03-08 PROCEDURE — 99214 OFFICE O/P EST MOD 30 MIN: CPT

## 2018-03-08 PROCEDURE — 71046 X-RAY EXAM CHEST 2 VIEWS: CPT

## 2018-03-08 RX ORDER — ONDANSETRON 4 MG/1
4 TABLET, ORALLY DISINTEGRATING ORAL EVERY 8 HOURS
Qty: 90 | Refills: 0 | Status: COMPLETED | COMMUNITY
Start: 2017-11-28 | End: 2018-03-08

## 2018-03-08 RX ORDER — AZITHROMYCIN 500 MG/1
500 TABLET, FILM COATED ORAL DAILY
Qty: 5 | Refills: 0 | Status: COMPLETED | COMMUNITY
Start: 2018-01-11 | End: 2018-03-08

## 2018-03-08 RX ORDER — PROMETHAZINE HYDROCHLORIDE AND CODEINE PHOSPHATE 6.25; 1 MG/5ML; MG/5ML
6.25-1 SOLUTION ORAL
Qty: 480 | Refills: 0 | Status: COMPLETED | COMMUNITY
Start: 2018-02-12 | End: 2018-03-08

## 2018-03-08 RX ORDER — KETOROLAC TROMETHAMINE 10 MG/1
10 TABLET, FILM COATED ORAL EVERY 4 HOURS
Qty: 30 | Refills: 0 | Status: COMPLETED | COMMUNITY
Start: 2017-11-13 | End: 2018-03-08

## 2018-03-08 RX ORDER — IBUPROFEN 400 MG/1
400 TABLET, FILM COATED ORAL EVERY 4 HOURS
Refills: 0 | Status: COMPLETED | COMMUNITY
End: 2018-03-08

## 2018-03-08 RX ORDER — OXYCODONE 5 MG/1
5 TABLET ORAL
Qty: 28 | Refills: 0 | Status: COMPLETED | COMMUNITY
Start: 2017-11-05 | End: 2018-03-08

## 2018-03-08 RX ORDER — ACETAMINOPHEN 500 MG/1
500 CAPSULE, LIQUID FILLED ORAL EVERY 4 HOURS
Refills: 0 | Status: COMPLETED | COMMUNITY
End: 2018-03-08

## 2018-03-09 ENCOUNTER — APPOINTMENT (OUTPATIENT)
Dept: PULMONOLOGY | Facility: CLINIC | Age: 73
End: 2018-03-09
Payer: MEDICARE

## 2018-03-09 VITALS
HEIGHT: 63 IN | HEART RATE: 80 BPM | DIASTOLIC BLOOD PRESSURE: 80 MMHG | WEIGHT: 143 LBS | SYSTOLIC BLOOD PRESSURE: 126 MMHG | BODY MASS INDEX: 25.34 KG/M2 | OXYGEN SATURATION: 95 %

## 2018-03-09 DIAGNOSIS — J20.9 ACUTE BRONCHITIS, UNSPECIFIED: ICD-10-CM

## 2018-03-09 DIAGNOSIS — J45.901 ACUTE BRONCHITIS, UNSPECIFIED: ICD-10-CM

## 2018-03-09 PROCEDURE — 99214 OFFICE O/P EST MOD 30 MIN: CPT | Mod: 25

## 2018-03-09 PROCEDURE — 94010 BREATHING CAPACITY TEST: CPT

## 2018-03-15 NOTE — ASU PATIENT PROFILE, ADULT - PSH
History of appendectomy    History of cataract surgery, right History of appendectomy    History of cataract surgery, right    History of surgery  tracheobronchoplasty 11/2017

## 2018-03-16 ENCOUNTER — APPOINTMENT (OUTPATIENT)
Dept: THORACIC SURGERY | Facility: HOSPITAL | Age: 73
End: 2018-03-16

## 2018-03-16 ENCOUNTER — OUTPATIENT (OUTPATIENT)
Dept: OUTPATIENT SERVICES | Facility: HOSPITAL | Age: 73
LOS: 1 days | Discharge: HOME | End: 2018-03-16
Payer: MEDICARE

## 2018-03-16 ENCOUNTER — RESULT REVIEW (OUTPATIENT)
Age: 73
End: 2018-03-16

## 2018-03-16 VITALS
HEART RATE: 89 BPM | OXYGEN SATURATION: 95 % | SYSTOLIC BLOOD PRESSURE: 176 MMHG | DIASTOLIC BLOOD PRESSURE: 84 MMHG | TEMPERATURE: 97 F | WEIGHT: 145.06 LBS | HEIGHT: 63.5 IN | RESPIRATION RATE: 16 BRPM

## 2018-03-16 VITALS — DIASTOLIC BLOOD PRESSURE: 87 MMHG | HEART RATE: 91 BPM | SYSTOLIC BLOOD PRESSURE: 163 MMHG | RESPIRATION RATE: 189 BRPM

## 2018-03-16 DIAGNOSIS — Z90.49 ACQUIRED ABSENCE OF OTHER SPECIFIED PARTS OF DIGESTIVE TRACT: Chronic | ICD-10-CM

## 2018-03-16 DIAGNOSIS — Z98.41 CATARACT EXTRACTION STATUS, RIGHT EYE: Chronic | ICD-10-CM

## 2018-03-16 DIAGNOSIS — Z98.890 OTHER SPECIFIED POSTPROCEDURAL STATES: Chronic | ICD-10-CM

## 2018-03-16 PROCEDURE — 88305 TISSUE EXAM BY PATHOLOGIST: CPT

## 2018-03-16 PROCEDURE — 43239 EGD BIOPSY SINGLE/MULTIPLE: CPT

## 2018-03-16 PROCEDURE — 31622 DX BRONCHOSCOPE/WASH: CPT

## 2018-03-16 NOTE — BRIEF OPERATIVE NOTE - PROCEDURE
<<-----Click on this checkbox to enter Procedure Esophagogastroduodenoscopy (EDG)  03/16/2018    Active  [email or web address]  Bronchoscopy  03/16/2018    Active  [email or web address]

## 2018-03-16 NOTE — BRIEF OPERATIVE NOTE - OPERATION/FINDINGS
airway s/p robotic assisted tracheobronchoplasty - normal  esophagus - lesion noted at 25cm from incisors, biopsy obtained

## 2018-03-19 LAB — SURGICAL PATHOLOGY STUDY: SIGNIFICANT CHANGE UP

## 2018-03-22 ENCOUNTER — APPOINTMENT (OUTPATIENT)
Dept: THORACIC SURGERY | Facility: CLINIC | Age: 73
End: 2018-03-22

## 2018-03-26 ENCOUNTER — APPOINTMENT (OUTPATIENT)
Dept: GASTROENTEROLOGY | Facility: HOSPITAL | Age: 73
End: 2018-03-26

## 2018-04-24 ENCOUNTER — APPOINTMENT (OUTPATIENT)
Dept: GASTROENTEROLOGY | Facility: HOSPITAL | Age: 73
End: 2018-04-24
Payer: MEDICARE

## 2018-04-24 ENCOUNTER — OUTPATIENT (OUTPATIENT)
Dept: OUTPATIENT SERVICES | Facility: HOSPITAL | Age: 73
LOS: 1 days | Discharge: HOME | End: 2018-04-24
Payer: MEDICARE

## 2018-04-24 DIAGNOSIS — Z98.41 CATARACT EXTRACTION STATUS, RIGHT EYE: Chronic | ICD-10-CM

## 2018-04-24 DIAGNOSIS — Z98.890 OTHER SPECIFIED POSTPROCEDURAL STATES: Chronic | ICD-10-CM

## 2018-04-24 DIAGNOSIS — Z90.49 ACQUIRED ABSENCE OF OTHER SPECIFIED PARTS OF DIGESTIVE TRACT: Chronic | ICD-10-CM

## 2018-04-24 PROCEDURE — 43239 EGD BIOPSY SINGLE/MULTIPLE: CPT

## 2018-04-24 PROCEDURE — 43235 EGD DIAGNOSTIC BRUSH WASH: CPT

## 2018-04-26 PROCEDURE — 91035 G-ESOPH REFLX TST W/ELECTROD: CPT | Mod: 26

## 2018-05-08 ENCOUNTER — APPOINTMENT (OUTPATIENT)
Dept: GASTROENTEROLOGY | Facility: CLINIC | Age: 73
End: 2018-05-08
Payer: MEDICARE

## 2018-05-08 VITALS
HEIGHT: 63 IN | BODY MASS INDEX: 25.52 KG/M2 | WEIGHT: 144 LBS | HEART RATE: 76 BPM | DIASTOLIC BLOOD PRESSURE: 72 MMHG | SYSTOLIC BLOOD PRESSURE: 138 MMHG | OXYGEN SATURATION: 98 % | TEMPERATURE: 98.1 F | RESPIRATION RATE: 14 BRPM

## 2018-05-08 PROCEDURE — 99214 OFFICE O/P EST MOD 30 MIN: CPT

## 2018-05-09 ENCOUNTER — APPOINTMENT (OUTPATIENT)
Dept: PULMONOLOGY | Facility: CLINIC | Age: 73
End: 2018-05-09
Payer: MEDICARE

## 2018-05-09 ENCOUNTER — NON-APPOINTMENT (OUTPATIENT)
Age: 73
End: 2018-05-09

## 2018-05-09 VITALS
SYSTOLIC BLOOD PRESSURE: 136 MMHG | BODY MASS INDEX: 25.95 KG/M2 | DIASTOLIC BLOOD PRESSURE: 62 MMHG | HEART RATE: 75 BPM | OXYGEN SATURATION: 98 % | WEIGHT: 152 LBS | RESPIRATION RATE: 17 BRPM | HEIGHT: 64 IN

## 2018-05-09 PROCEDURE — 99214 OFFICE O/P EST MOD 30 MIN: CPT | Mod: 25

## 2018-05-09 PROCEDURE — 94618 PULMONARY STRESS TESTING: CPT

## 2018-05-09 PROCEDURE — 94010 BREATHING CAPACITY TEST: CPT | Mod: 59

## 2018-05-09 PROCEDURE — 94727 GAS DIL/WSHOT DETER LNG VOL: CPT

## 2018-05-09 PROCEDURE — 94729 DIFFUSING CAPACITY: CPT

## 2018-05-10 ENCOUNTER — CHART COPY (OUTPATIENT)
Age: 73
End: 2018-05-10

## 2018-05-17 ENCOUNTER — OUTPATIENT (OUTPATIENT)
Dept: OUTPATIENT SERVICES | Facility: HOSPITAL | Age: 73
LOS: 1 days | End: 2018-05-17
Payer: MEDICARE

## 2018-05-17 ENCOUNTER — APPOINTMENT (OUTPATIENT)
Dept: GASTROENTEROLOGY | Facility: HOSPITAL | Age: 73
End: 2018-05-17

## 2018-05-17 DIAGNOSIS — K22.70 BARRETT'S ESOPHAGUS WITHOUT DYSPLASIA: ICD-10-CM

## 2018-05-17 DIAGNOSIS — K21.9 GASTRO-ESOPHAGEAL REFLUX DISEASE WITHOUT ESOPHAGITIS: ICD-10-CM

## 2018-05-17 DIAGNOSIS — Z90.49 ACQUIRED ABSENCE OF OTHER SPECIFIED PARTS OF DIGESTIVE TRACT: Chronic | ICD-10-CM

## 2018-05-17 DIAGNOSIS — Z98.890 OTHER SPECIFIED POSTPROCEDURAL STATES: Chronic | ICD-10-CM

## 2018-05-17 DIAGNOSIS — Z98.41 CATARACT EXTRACTION STATUS, RIGHT EYE: Chronic | ICD-10-CM

## 2018-05-17 PROCEDURE — 91010 ESOPHAGUS MOTILITY STUDY: CPT | Mod: 26

## 2018-05-17 PROCEDURE — 91037 ESOPH IMPED FUNCTION TEST: CPT | Mod: 26

## 2018-05-17 PROCEDURE — 91037 ESOPH IMPED FUNCTION TEST: CPT

## 2018-05-17 PROCEDURE — 91010 ESOPHAGUS MOTILITY STUDY: CPT

## 2018-05-22 ENCOUNTER — APPOINTMENT (OUTPATIENT)
Dept: GASTROENTEROLOGY | Facility: HOSPITAL | Age: 73
End: 2018-05-22

## 2018-05-30 ENCOUNTER — APPOINTMENT (OUTPATIENT)
Dept: OTOLARYNGOLOGY | Facility: CLINIC | Age: 73
End: 2018-05-30
Payer: MEDICARE

## 2018-05-30 DIAGNOSIS — Z87.19 PERSONAL HISTORY OF OTHER DISEASES OF THE DIGESTIVE SYSTEM: ICD-10-CM

## 2018-05-30 DIAGNOSIS — R49.9 UNSPECIFIED VOICE AND RESONANCE DISORDER: ICD-10-CM

## 2018-05-30 DIAGNOSIS — J04.0 ACUTE LARYNGITIS: ICD-10-CM

## 2018-05-30 DIAGNOSIS — K21.9 ACUTE LARYNGITIS: ICD-10-CM

## 2018-05-30 PROCEDURE — 31575 DIAGNOSTIC LARYNGOSCOPY: CPT

## 2018-05-30 PROCEDURE — 99203 OFFICE O/P NEW LOW 30 MIN: CPT | Mod: 25

## 2018-05-30 RX ORDER — ACETYLCYSTEINE 100 MG/ML
10 SOLUTION ORAL; RESPIRATORY (INHALATION) 3 TIMES DAILY
Qty: 6 | Refills: 0 | Status: COMPLETED | COMMUNITY
Start: 2017-11-08 | End: 2018-05-30

## 2018-05-30 RX ORDER — AMITRIPTYLINE HYDROCHLORIDE 25 MG/1
25 TABLET, FILM COATED ORAL
Qty: 90 | Refills: 1 | Status: COMPLETED | COMMUNITY
Start: 2018-03-09 | End: 2018-05-30

## 2018-05-31 RX ORDER — MONTELUKAST SODIUM 10 MG/1
10 TABLET, FILM COATED ORAL
Qty: 1 | Refills: 1 | Status: COMPLETED | COMMUNITY
Start: 2017-06-20 | End: 2018-05-31

## 2018-05-31 RX ORDER — GUAIFENESIN 600 MG/1
600 TABLET, EXTENDED RELEASE ORAL
Qty: 28 | Refills: 0 | Status: COMPLETED | COMMUNITY
Start: 2017-11-08 | End: 2018-05-31

## 2018-05-31 RX ORDER — RANITIDINE 300 MG/1
300 TABLET ORAL
Qty: 90 | Refills: 1 | Status: COMPLETED | COMMUNITY
Start: 2017-08-08 | End: 2018-05-31

## 2018-05-31 RX ORDER — PROMETHAZINE HYDROCHLORIDE AND CODEINE PHOSPHATE 6.25; 1 MG/5ML; MG/5ML
6.25-1 SOLUTION ORAL
Qty: 210 | Refills: 0 | Status: COMPLETED | COMMUNITY
Start: 2018-01-12 | End: 2018-05-31

## 2018-06-28 ENCOUNTER — APPOINTMENT (OUTPATIENT)
Dept: THORACIC SURGERY | Facility: CLINIC | Age: 73
End: 2018-06-28
Payer: MEDICARE

## 2018-06-28 VITALS
HEART RATE: 98 BPM | TEMPERATURE: 97.2 F | WEIGHT: 216 LBS | BODY MASS INDEX: 37.08 KG/M2 | SYSTOLIC BLOOD PRESSURE: 161 MMHG | DIASTOLIC BLOOD PRESSURE: 77 MMHG | RESPIRATION RATE: 20 BRPM | OXYGEN SATURATION: 96 %

## 2018-06-28 PROCEDURE — 99214 OFFICE O/P EST MOD 30 MIN: CPT

## 2018-07-06 ENCOUNTER — APPOINTMENT (OUTPATIENT)
Dept: GASTROENTEROLOGY | Facility: CLINIC | Age: 73
End: 2018-07-06
Payer: MEDICARE

## 2018-07-06 VITALS
TEMPERATURE: 98.2 F | HEART RATE: 81 BPM | RESPIRATION RATE: 20 BRPM | BODY MASS INDEX: 23.56 KG/M2 | HEIGHT: 64 IN | OXYGEN SATURATION: 98 % | SYSTOLIC BLOOD PRESSURE: 120 MMHG | DIASTOLIC BLOOD PRESSURE: 70 MMHG | WEIGHT: 138 LBS

## 2018-07-06 DIAGNOSIS — Z88.9 ALLERGY STATUS TO UNSPECIFIED DRUGS, MEDICAMENTS AND BIOLOGICAL SUBSTANCES: ICD-10-CM

## 2018-07-06 PROCEDURE — 99214 OFFICE O/P EST MOD 30 MIN: CPT

## 2018-07-09 ENCOUNTER — MEDICATION RENEWAL (OUTPATIENT)
Age: 73
End: 2018-07-09

## 2018-07-09 RX ORDER — MONTELUKAST SODIUM 5 MG/1
5 TABLET, CHEWABLE ORAL
Qty: 30 | Refills: 5 | Status: DISCONTINUED | COMMUNITY
Start: 2018-07-06 | End: 2018-07-09

## 2018-09-05 ENCOUNTER — APPOINTMENT (OUTPATIENT)
Dept: PULMONOLOGY | Facility: CLINIC | Age: 73
End: 2018-09-05
Payer: MEDICARE

## 2018-09-05 ENCOUNTER — NON-APPOINTMENT (OUTPATIENT)
Age: 73
End: 2018-09-05

## 2018-09-05 VITALS
OXYGEN SATURATION: 99 % | DIASTOLIC BLOOD PRESSURE: 70 MMHG | RESPIRATION RATE: 18 BRPM | WEIGHT: 149 LBS | BODY MASS INDEX: 25.44 KG/M2 | HEART RATE: 74 BPM | HEIGHT: 64 IN | SYSTOLIC BLOOD PRESSURE: 150 MMHG

## 2018-09-05 PROCEDURE — 99214 OFFICE O/P EST MOD 30 MIN: CPT | Mod: 25

## 2018-09-05 PROCEDURE — 94010 BREATHING CAPACITY TEST: CPT

## 2018-09-05 NOTE — ADDENDUM
[FreeTextEntry1] : All medical record entries made by zackary Singer were at Dr. Denzel Saunders's, direction and personally dictated by me on (9/5/2018). I have reviewed the chart and agree that the record accurately reflects my personal performance of the history, physical exam, assessment and plan. I have also personally directed, reviewed, and agree with the discharge instructions.

## 2018-09-05 NOTE — ASSESSMENT
[FreeTextEntry1] : Ms. Phipps is a 71 year old female with a history of recently diverticulosis, GERD, collagenous colitis who now comes in for a cough that is felt to be acute bronchitis/asthmatic bronchitis, ?PNA complicated by tracheomalacia. She is s/p tracheoplasty with bronchospasm. - She has continued to improve. \par \par Her cough and shortness of breath is related to:\par -acquired tracheobronchomalacia \par -asthma \par -abnormal hypersensitivity panel \par -post nasal drip syndrome\par -GERD\par \par problem 1: severe persistent asthma (off Rx- in reserve) \par -continue to use Xopenex 1.25 via nebulizer Q8H (in the morning)\par -continue to use Pulmicort 0.5 BID (after Xopenex)\par -followed by Spiriva 1 inhalation QD\par -continue to use Singulair 10 mg QHS\par \par -Asthma is believed to be caused by inherited (genetic) and environmental factor, but its exact cause is unknown. Asthma may be triggered by allergens, lung infections, or irritants in the air. Asthma triggers are different for each person\par -Inhaler technique reviewed as well as oral hygiene techniques reviewed with patient. Avoidance of cold air, extremes of temperature, rescue inhaler should be used before exercise. Order of medication reviewed with patient. Recommended use of a cool mist humidifier in the bedroom. \par \par problem 3: sensory neuropathic cough\par -transition to Elavil 25 mg up to TID- QHS- on the shelf \par \par -Sensory neuropathic cough is an etiology of cough that is often realized once someone has been ruled out for common disease such as: asthma, COPD, eosinophilic bronchitis, bronchiectasis, post nasal drip, and GERD. It sometimes develops following a URI, herpes zoster outbreak in pharynx or thyroid or cervical spine injury. However, many patients have no identifiable antecedent explanation. \par \par problem 4: tracheobronchomalacia\par -s/p surgery with Dr. Boyd Ramirez in 11/2017\par -s/p bronchoscopy with Dr. Ramirez \par \par Tracheomalacia is usually acquired in adults and common causes include damage by tracheostomy or endotracheal intubation damaging the tracheal cartilage with increase risk with multiple intubations, prolonged intubation, and concurrent high dose steroid therapy; external chest wall trauma and surgery; chronic compression of the trachea by benign etiologies (eg, benign mediastinal goiter) or malignancy; relapsing polychondritis; or recurrent infection. Tracheomalacia can be asymptomatic, however signs or symptoms can develop as the severity of the airway narrowing progresses with major symptoms include dyspnea, cough, and sputum retention. Other symptoms include severe paroxysms of coughing, wheezing or stridor, barking cough and may be exacerbated by forced expiration, cough, and Valsalva maneuver. Tracheomalacia is diagnosed by a bronchoscopic visualization of dynamic airway collapse on dynamic chest CT. Therapy is warranted in symptomatic patients with severe tracheomalacia and includes surgical repair as tracheobronchoplasty. The patient was referred to Dr. Matt Ramirez or Dr. Tai Kohli, at [facility] for a surgical consult.\par \par problem 5: abnormal hypersensitivity panel/allergic panel\par -s/p allergist evaluation - no treatment recommended at this time\par \par -Environmental measures for allergies were encouraged including mattress and pillow cover, air purifier, and environmental controls. \par \par Problem 6: s/p mycoplasma pneumonia \par -s/p prolonged Zithromax for anti inflammatory\par \par problem 7: overweight\par -Weight loss, exercise, and diet control were discussed and are highly encouraged. Treatment options were given such as, aqua therapy, and contacting a nutritionist. Recommended to use the elliptical, stationary bike, less use of treadmill. Obesity is associated with worsening asthma, shortness of breath, and potential for cardiac disease, diabetes, and other underlying medical conditions.\par \par problem 8: GERD/LPR\par -continue to use Protonix 40 mg before breakfast\par -continue to use Zantac 300 mg QHS\par \par -Rule of 2s: avoid eating too much, eating too late, eating too spicy, eating two hours before bed\par -Things to avoid including overeating, spicy foods, tight clothing, eating within three hours of bed, this list is not all inclusive. \par -For treatment of reflux, possible options discussed including diet control, H2 blockers, PPIs, as well as coating motility agents discussed as treatment options. Timing of meals and proximity of last meal to sleep were discussed. If symptoms persist, a formal gastrointestinal evaluation is needed. \par \par problem 9: dysphonia\par -recommended to use [employer]\par -felt to be related to cough causing vocal cord trauma, LPR, medication effect\par -recommended hydration and time \par \par problem 10: allergic rhinitis\par -recommended xlear saline \par -recommended OTC antihistamine PRN (Claritin 10 mg QAM)\par \par -Environmental measures for allergies were encouraged including mattress and pillow cover, air purifier, and environmental controls.\par \par problem 11: SUSAN\par -recommended to take nocturnal medications earlier \par -recommended to have a mouth piece made \par -based on her Nunez's esophagus, EDS, ? snoring, elevated mallampati class, and poor sleep; she is being set up for an at home sleep study \par \par Sleep apnea is associated with adverse clinical consequences which an affect most organ systems. Cardiovascular disease risk includes arrhythmias, atrial fibrillation, hypertension, coronary artery disease, and stroke. Metabolic disorders include diabetes type 2, non-alcoholic fatty liver disease. Mood disorder especially depression; and cognitive decline especially in the elderly. Associations with chronic reflux/Nunez’s esophagus some but not all inclusive. \par -Reasons include arousal consistent with hypopnea; respiratory events most prominent in REM sleep or supine position; therefore sleep staging and body position are important for accurate diagnosis and estimation of AHI.\par -Good sleep hygiene was encouraged including avoiding watching television an hour before bed, keeping caffeine at a low, avoiding reading, television, or anything, in bed, no drinking any liquids three hours before bedtime, and only getting into bed when tired and ready for sleep. \par \par problem 12: health maintenance \par -recommended a yearly flu shot after October 15\par -recommended strep pneumonia vaccines: Prevnar-13 vaccine, followed by Pneumo vaccine 23 on year following\par -recommended early intervention for URIs\par -recommended osteoporosis evaluations\par -recommended early dermatological evaluations\par -recommended after the age of 50 to the age of 70, colonoscopy every 5 years\par \par F/U in 3 months\par She is encouraged to call with any changes, concerns, or questions.

## 2018-09-05 NOTE — PROCEDURE
[FreeTextEntry1] : PFT - spi reveals normal flows; FEV1 is 2.34 which is 108% of predicted, normal flow volume loop

## 2018-09-05 NOTE — PHYSICAL EXAM
[General Appearance - Well Developed] : well developed [Normal Appearance] : normal appearance [Well Groomed] : well groomed [General Appearance - Well Nourished] : well nourished [No Deformities] : no deformities [General Appearance - In No Acute Distress] : no acute distress [Normal Conjunctiva] : the conjunctiva exhibited no abnormalities [Eyelids - No Xanthelasma] : the eyelids demonstrated no xanthelasmas [Normal Oropharynx] : normal oropharynx [Neck Appearance] : the appearance of the neck was normal [Neck Cervical Mass (___cm)] : no neck mass was observed [Jugular Venous Distention Increased] : there was no jugular-venous distention [Thyroid Diffuse Enlargement] : the thyroid was not enlarged [Thyroid Nodule] : there were no palpable thyroid nodules [Heart Rate And Rhythm] : heart rate and rhythm were normal [Heart Sounds] : normal S1 and S2 [Murmurs] : no murmurs present [Respiration, Rhythm And Depth] : normal respiratory rhythm and effort [Exaggerated Use Of Accessory Muscles For Inspiration] : no accessory muscle use [Auscultation Breath Sounds / Voice Sounds] : lungs were clear to auscultation bilaterally [Abdomen Soft] : soft [Abdomen Tenderness] : non-tender [Abdomen Mass (___ Cm)] : no abdominal mass palpated [Abnormal Walk] : normal gait [Gait - Sufficient For Exercise Testing] : the gait was sufficient for exercise testing [Nail Clubbing] : no clubbing of the fingernails [Cyanosis, Localized] : no localized cyanosis [Petechial Hemorrhages (___cm)] : no petechial hemorrhages [] : no ischemic changes [Deep Tendon Reflexes (DTR)] : deep tendon reflexes were 2+ and symmetric [Sensation] : the sensory exam was normal to light touch and pinprick [No Focal Deficits] : no focal deficits [Oriented To Time, Place, And Person] : oriented to person, place, and time [Impaired Insight] : insight and judgment were intact [Affect] : the affect was normal [II] : II [FreeTextEntry1] : I:E ratio 1:3; clear

## 2018-09-05 NOTE — REASON FOR VISIT
[Follow-Up] : a follow-up visit [FreeTextEntry1] : acid reflux, allergic rhinitis, asthma, Nunez's esophagus, bronchoplasty, change of voice, persistent cough, dysphonia, GERD, allergies, LPRD, SUSAN, reflux laryngitis, s/p tracheoplasty, SOB and TBM

## 2018-09-05 NOTE — HISTORY OF PRESENT ILLNESS
[FreeTextEntry1] : Ms. Phipps is a 73 year old female with a history of acid reflux, allergic rhinitis, asthma, Nunez's esophagus, bronchoplasty, change of voice, persistent cough, dysphonia, GERD, allergies, LPRD, SUSAN, reflux laryngitis, s/p tracheoplasty, SOB and TBM presenting to the office today for a follow up visit. Her chief complaint is cough.\par -She states that she has been coughing when things in the environment bother her breathing\par -she reports that her voice will occasionally be hoarse\par -she notes that she will occasionally have some leg swelling, especially when she takes her shoes off and sits down for extended periods of time\par -she reports that her weight has been stable\par -she reports that she frequently wakes up throughout the night\par -she notes that she has been eating well\par -she states that she has been trying to work out more regularly\par -she notes that her breathing has improved, and that she is able to laugh without becoming short of breath now\par -she denies any headaches, nausea, vomiting, fever, chills, sweats, chest pain, chest pressure, diarrhea, constipation, dysphagia, dizziness, leg swelling, leg pain, itchy eyes, itchy ears, heartburn, reflux, or sour taste in the mouth.

## 2018-09-13 ENCOUNTER — APPOINTMENT (OUTPATIENT)
Dept: GASTROENTEROLOGY | Facility: CLINIC | Age: 73
End: 2018-09-13

## 2018-12-05 ENCOUNTER — APPOINTMENT (OUTPATIENT)
Dept: PULMONOLOGY | Facility: CLINIC | Age: 73
End: 2018-12-05

## 2019-01-23 ENCOUNTER — FORM ENCOUNTER (OUTPATIENT)
Age: 74
End: 2019-01-23

## 2019-01-24 ENCOUNTER — APPOINTMENT (OUTPATIENT)
Dept: CT IMAGING | Facility: CLINIC | Age: 74
End: 2019-01-24
Payer: MEDICARE

## 2019-01-24 ENCOUNTER — NON-APPOINTMENT (OUTPATIENT)
Age: 74
End: 2019-01-24

## 2019-01-24 ENCOUNTER — APPOINTMENT (OUTPATIENT)
Dept: PULMONOLOGY | Facility: CLINIC | Age: 74
End: 2019-01-24
Payer: MEDICARE

## 2019-01-24 ENCOUNTER — OUTPATIENT (OUTPATIENT)
Dept: OUTPATIENT SERVICES | Facility: HOSPITAL | Age: 74
LOS: 1 days | End: 2019-01-24
Payer: MEDICARE

## 2019-01-24 VITALS
BODY MASS INDEX: 24.92 KG/M2 | DIASTOLIC BLOOD PRESSURE: 80 MMHG | RESPIRATION RATE: 17 BRPM | HEART RATE: 65 BPM | SYSTOLIC BLOOD PRESSURE: 120 MMHG | HEIGHT: 64 IN | WEIGHT: 146 LBS | OXYGEN SATURATION: 100 %

## 2019-01-24 DIAGNOSIS — Z98.41 CATARACT EXTRACTION STATUS, RIGHT EYE: Chronic | ICD-10-CM

## 2019-01-24 DIAGNOSIS — Z98.890 OTHER SPECIFIED POSTPROCEDURAL STATES: Chronic | ICD-10-CM

## 2019-01-24 DIAGNOSIS — R93.89 ABNORMAL FINDINGS ON DIAGNOSTIC IMAGING OF OTHER SPECIFIED BODY STRUCTURES: ICD-10-CM

## 2019-01-24 DIAGNOSIS — Z90.49 ACQUIRED ABSENCE OF OTHER SPECIFIED PARTS OF DIGESTIVE TRACT: Chronic | ICD-10-CM

## 2019-01-24 PROCEDURE — 71250 CT THORAX DX C-: CPT | Mod: 26

## 2019-01-24 PROCEDURE — 99214 OFFICE O/P EST MOD 30 MIN: CPT | Mod: 25

## 2019-01-24 PROCEDURE — 71250 CT THORAX DX C-: CPT

## 2019-01-24 PROCEDURE — 94010 BREATHING CAPACITY TEST: CPT

## 2019-01-24 NOTE — PROCEDURE
[FreeTextEntry1] : PFT - spi reveals normal flows; FEV1 is 2.05 which is 94% of predicted, normal flow volume loop

## 2019-01-24 NOTE — PHYSICAL EXAM
[General Appearance - Well Developed] : well developed [Normal Appearance] : normal appearance [Well Groomed] : well groomed [General Appearance - Well Nourished] : well nourished [No Deformities] : no deformities [General Appearance - In No Acute Distress] : no acute distress [Normal Conjunctiva] : the conjunctiva exhibited no abnormalities [Eyelids - No Xanthelasma] : the eyelids demonstrated no xanthelasmas [Normal Oropharynx] : normal oropharynx [II] : II [Neck Appearance] : the appearance of the neck was normal [Neck Cervical Mass (___cm)] : no neck mass was observed [Jugular Venous Distention Increased] : there was no jugular-venous distention [Thyroid Diffuse Enlargement] : the thyroid was not enlarged [Thyroid Nodule] : there were no palpable thyroid nodules [Heart Rate And Rhythm] : heart rate and rhythm were normal [Heart Sounds] : normal S1 and S2 [Murmurs] : no murmurs present [Respiration, Rhythm And Depth] : normal respiratory rhythm and effort [Exaggerated Use Of Accessory Muscles For Inspiration] : no accessory muscle use [Auscultation Breath Sounds / Voice Sounds] : lungs were clear to auscultation bilaterally [Abdomen Soft] : soft [Abdomen Tenderness] : non-tender [Abdomen Mass (___ Cm)] : no abdominal mass palpated [Abnormal Walk] : normal gait [Gait - Sufficient For Exercise Testing] : the gait was sufficient for exercise testing [Nail Clubbing] : no clubbing of the fingernails [Cyanosis, Localized] : no localized cyanosis [Petechial Hemorrhages (___cm)] : no petechial hemorrhages [] : no ischemic changes [Deep Tendon Reflexes (DTR)] : deep tendon reflexes were 2+ and symmetric [Sensation] : the sensory exam was normal to light touch and pinprick [No Focal Deficits] : no focal deficits [Oriented To Time, Place, And Person] : oriented to person, place, and time [Impaired Insight] : insight and judgment were intact [Affect] : the affect was normal [FreeTextEntry1] : I:E ratio 1:3; clear

## 2019-01-24 NOTE — HISTORY OF PRESENT ILLNESS
[FreeTextEntry1] : Ms. Phipps is a 73 year old female with a history of acid reflux, allergic rhinitis, asthma, Nunez's esophagus, bronchoplasty, change of voice, persistent cough, dysphonia, GERD, allergies, LPRD, SUSAN, reflux laryngitis, s/p tracheoplasty, SOB and TBM presenting to the office today for a follow up visit. Her chief complaint is \par -she notes generally doing well. \par -she reports that her voice will occasionally be hoarse\par -she reports that her weight has been stable\par -she notes she cover her mouth and nose in the cold weather. \par -she notes polyarticular arthralgia. \par -she reports that she frequently wakes up throughout the night, sleep is considerably low. \par -she notes that she has been eating well\par -she states that she has been trying to work out more regularly\par -she denies any headaches, nausea, vomiting, fever, chills, sweats, chest pain, chest pressure, diarrhea, constipation, dysphagia, dizziness, leg swelling, leg pain, itchy eyes, itchy ears, heartburn, reflux, or sour taste in the mouth.

## 2019-01-24 NOTE — REASON FOR VISIT
[Follow-Up] : a follow-up visit [FreeTextEntry1] : allergic rhinitis, asthma, Nunez's esophagus, bronchoplasty, persistent cough, dysphonia, GERD, LPRD, SUSAN, reflux laryngitis, s/p tracheoplasty, SOB and TBM - now Abnormal CT

## 2019-01-24 NOTE — ASSESSMENT
[FreeTextEntry1] : Ms. Phipps is a 73 year old female with a history of recently diverticulosis, GERD, collagenous colitis, asthma, allergies, TBM, OSAS who now comes  ?PNA complicated by tracheomalacia. She is s/p tracheoplasty with bronchospasm. - She has continued to improve. \par \par Her cough and shortness of breath is related to:\par -acquired tracheobronchomalacia \par -asthma \par -abnormal hypersensitivity panel \par -post nasal drip syndrome\par -GERD\par \par problem: Abnormal radiologic film \par -Ordered Dedicated CT Chest. \par \par problem 1: severe persistent asthma (off Rx- in reserve) \par -continue to use Xopenex 1.25 via nebulizer Q8H (in the morning)\par -continue to use Pulmicort 0.5 BID (after Xopenex)\par -followed by Spiriva 1 inhalation QD\par -continue to use Singulair 10 mg QHS\par \par -Asthma is believed to be caused by inherited (genetic) and environmental factor, but its exact cause is unknown. Asthma may be triggered by allergens, lung infections, or irritants in the air. Asthma triggers are different for each person\par -Inhaler technique reviewed as well as oral hygiene techniques reviewed with patient. Avoidance of cold air, extremes of temperature, rescue inhaler should be used before exercise. Order of medication reviewed with patient. Recommended use of a cool mist humidifier in the bedroom. \par \par problem 3: sensory neuropathic cough\par -transition to Elavil 25 mg up to TID- QHS- on the shelf \par \par -Sensory neuropathic cough is an etiology of cough that is often realized once someone has been ruled out for common disease such as: asthma, COPD, eosinophilic bronchitis, bronchiectasis, post nasal drip, and GERD. It sometimes develops following a URI, herpes zoster outbreak in pharynx or thyroid or cervical spine injury. However, many patients have no identifiable antecedent explanation. \par \par problem 4: tracheobronchomalacia\par -s/p surgery with Dr. Boyd Ramirez in 11/2017\par -s/p bronchoscopy with Dr. Ramirez \par \par Tracheomalacia is usually acquired in adults and common causes include damage by tracheostomy or endotracheal intubation damaging the tracheal cartilage with increase risk with multiple intubations, prolonged intubation, and concurrent high dose steroid therapy; external chest wall trauma and surgery; chronic compression of the trachea by benign etiologies (eg, benign mediastinal goiter) or malignancy; relapsing polychondritis; or recurrent infection. Tracheomalacia can be asymptomatic, however signs or symptoms can develop as the severity of the airway narrowing progresses with major symptoms include dyspnea, cough, and sputum retention. Other symptoms include severe paroxysms of coughing, wheezing or stridor, barking cough and may be exacerbated by forced expiration, cough, and Valsalva maneuver. Tracheomalacia is diagnosed by a bronchoscopic visualization of dynamic airway collapse on dynamic chest CT. Therapy is warranted in symptomatic patients with severe tracheomalacia and includes surgical repair as tracheobronchoplasty. The patient was referred to Dr. Matt Ramirez or Dr. Tai Kohli, at [facility] for a surgical consult.\par \par problem 5: abnormal hypersensitivity panel/allergic panel\par -s/p allergist evaluation - no treatment recommended at this time\par \par -Environmental measures for allergies were encouraged including mattress and pillow cover, air purifier, and environmental controls. \par \par Problem 6: s/p mycoplasma pneumonia \par -s/p prolonged Zithromax for anti inflammatory\par \par problem 7: overweight\par -Weight loss, exercise, and diet control were discussed and are highly encouraged. Treatment options were given such as, aqua therapy, and contacting a nutritionist. Recommended to use the elliptical, stationary bike, less use of treadmill. Obesity is associated with worsening asthma, shortness of breath, and potential for cardiac disease, diabetes, and other underlying medical conditions.\par \par problem 8: GERD/LPR\par -continue to use Protonix 40 mg before breakfast\par -continue to use Zantac 300 mg QHS\par \par -Rule of 2s: avoid eating too much, eating too late, eating too spicy, eating two hours before bed\par -Things to avoid including overeating, spicy foods, tight clothing, eating within three hours of bed, this list is not all inclusive. \par -For treatment of reflux, possible options discussed including diet control, H2 blockers, PPIs, as well as coating motility agents discussed as treatment options. Timing of meals and proximity of last meal to sleep were discussed. If symptoms persist, a formal gastrointestinal evaluation is needed. \par \par problem 9: dysphonia\par -recommended to use Fisherman's Friend Lozengers\par -felt to be related to cough causing vocal cord trauma, LPR, medication effect\par -recommended hydration and time \par \par problem 10: allergic rhinitis\par -recommended xlear saline \par -recommended OTC antihistamine PRN (Claritin 10 mg QAM)\par \par -Environmental measures for allergies were encouraged including mattress and pillow cover, air purifier, and environmental controls.\par \par problem 11: SUSAN\par -recommended to take nocturnal medications earlier; OxyAid\par -recommended to have a mouth piece made - denied; failed CPAP\par -based on her Nunez's esophagus, EDS, ? snoring, elevated mallampati class, and poor sleep; she is being set up for an at home sleep study \par \par Sleep apnea is associated with adverse clinical consequences which an affect most organ systems. Cardiovascular disease risk includes arrhythmias, atrial fibrillation, hypertension, coronary artery disease, and stroke. Metabolic disorders include diabetes type 2, non-alcoholic fatty liver disease. Mood disorder especially depression; and cognitive decline especially in the elderly. Associations with chronic reflux/Nunez’s esophagus some but not all inclusive. \par -Reasons include arousal consistent with hypopnea; respiratory events most prominent in REM sleep or supine position; therefore sleep staging and body position are important for accurate diagnosis and estimation of AHI.\par -Good sleep hygiene was encouraged including avoiding watching television an hour before bed, keeping caffeine at a low, avoiding reading, television, or anything, in bed, no drinking any liquids three hours before bedtime, and only getting into bed when tired and ready for sleep. \par \par problem 12: health maintenance \par -recommended a yearly flu shot after October 15 2018\par -recommended strep pneumonia vaccines: Prevnar-13 vaccine, followed by Pneumo vaccine 23 on year following\par -recommended early intervention for URIs\par -recommended osteoporosis evaluations\par -recommended early dermatological evaluations\par -recommended after the age of 50 to the age of 70, colonoscopy every 5 years\par \par F/U in 3 months\par She is encouraged to call with any changes, concerns, or questions.

## 2019-01-24 NOTE — ADDENDUM
[FreeTextEntry1] : Documented by Nancy Frost acting as a scribe for Dr. Denzel Saunders on 1/24/19.\par \par All medical record entries made by the scribe, Nancy Frost, were at my, Dr. Denzel Saunders's, direction and personally dictated by me on 1/24/19. I have reviewed the chart and agree that the record accurately reflects my personal performance of the history, physical exam, assessment and plan. I have also personally directed, reviewed, and agree with the discharge instructions.\par

## 2019-01-31 ENCOUNTER — RESULT REVIEW (OUTPATIENT)
Age: 74
End: 2019-01-31

## 2019-03-06 ENCOUNTER — EMERGENCY (EMERGENCY)
Facility: HOSPITAL | Age: 74
LOS: 1 days | Discharge: HOME | End: 2019-03-06
Attending: EMERGENCY MEDICINE
Payer: MEDICARE

## 2019-03-06 VITALS
WEIGHT: 145.06 LBS | RESPIRATION RATE: 20 BRPM | TEMPERATURE: 98 F | SYSTOLIC BLOOD PRESSURE: 199 MMHG | HEIGHT: 63 IN | HEART RATE: 98 BPM | DIASTOLIC BLOOD PRESSURE: 107 MMHG | OXYGEN SATURATION: 100 %

## 2019-03-06 VITALS — DIASTOLIC BLOOD PRESSURE: 83 MMHG | SYSTOLIC BLOOD PRESSURE: 153 MMHG

## 2019-03-06 DIAGNOSIS — Z98.890 OTHER SPECIFIED POSTPROCEDURAL STATES: Chronic | ICD-10-CM

## 2019-03-06 DIAGNOSIS — Z98.41 CATARACT EXTRACTION STATUS, RIGHT EYE: Chronic | ICD-10-CM

## 2019-03-06 DIAGNOSIS — Z90.49 ACQUIRED ABSENCE OF OTHER SPECIFIED PARTS OF DIGESTIVE TRACT: Chronic | ICD-10-CM

## 2019-03-06 LAB
ALBUMIN SERPL ELPH-MCNC: 4.5 G/DL — SIGNIFICANT CHANGE UP (ref 3.3–5)
ALP SERPL-CCNC: 56 U/L — SIGNIFICANT CHANGE UP (ref 40–120)
ALT FLD-CCNC: 9 U/L — LOW (ref 10–45)
ANION GAP SERPL CALC-SCNC: 13 MMOL/L — SIGNIFICANT CHANGE UP (ref 5–17)
AST SERPL-CCNC: 22 U/L — SIGNIFICANT CHANGE UP (ref 10–40)
BASOPHILS # BLD AUTO: 0 K/UL — SIGNIFICANT CHANGE UP (ref 0–0.2)
BASOPHILS NFR BLD AUTO: 0.3 % — SIGNIFICANT CHANGE UP (ref 0–2)
BILIRUB SERPL-MCNC: 0.3 MG/DL — SIGNIFICANT CHANGE UP (ref 0.2–1.2)
BUN SERPL-MCNC: 20 MG/DL — SIGNIFICANT CHANGE UP (ref 7–23)
CALCIUM SERPL-MCNC: 10.2 MG/DL — SIGNIFICANT CHANGE UP (ref 8.4–10.5)
CHLORIDE SERPL-SCNC: 101 MMOL/L — SIGNIFICANT CHANGE UP (ref 96–108)
CO2 SERPL-SCNC: 23 MMOL/L — SIGNIFICANT CHANGE UP (ref 22–31)
CREAT SERPL-MCNC: 0.93 MG/DL — SIGNIFICANT CHANGE UP (ref 0.5–1.3)
EOSINOPHIL # BLD AUTO: 0.2 K/UL — SIGNIFICANT CHANGE UP (ref 0–0.5)
EOSINOPHIL NFR BLD AUTO: 2 % — SIGNIFICANT CHANGE UP (ref 0–6)
GLUCOSE SERPL-MCNC: 105 MG/DL — HIGH (ref 70–99)
HCT VFR BLD CALC: 38.9 % — SIGNIFICANT CHANGE UP (ref 34.5–45)
HGB BLD-MCNC: 13.7 G/DL — SIGNIFICANT CHANGE UP (ref 11.5–15.5)
LYMPHOCYTES # BLD AUTO: 3.3 K/UL — SIGNIFICANT CHANGE UP (ref 1–3.3)
LYMPHOCYTES # BLD AUTO: 32.6 % — SIGNIFICANT CHANGE UP (ref 13–44)
MCHC RBC-ENTMCNC: 33.2 PG — SIGNIFICANT CHANGE UP (ref 27–34)
MCHC RBC-ENTMCNC: 35.3 GM/DL — SIGNIFICANT CHANGE UP (ref 32–36)
MCV RBC AUTO: 93.8 FL — SIGNIFICANT CHANGE UP (ref 80–100)
MONOCYTES # BLD AUTO: 0.7 K/UL — SIGNIFICANT CHANGE UP (ref 0–0.9)
MONOCYTES NFR BLD AUTO: 6.8 % — SIGNIFICANT CHANGE UP (ref 2–14)
NEUTROPHILS # BLD AUTO: 5.8 K/UL — SIGNIFICANT CHANGE UP (ref 1.8–7.4)
NEUTROPHILS NFR BLD AUTO: 58.3 % — SIGNIFICANT CHANGE UP (ref 43–77)
PLATELET # BLD AUTO: 308 K/UL — SIGNIFICANT CHANGE UP (ref 150–400)
POTASSIUM SERPL-MCNC: 4 MMOL/L — SIGNIFICANT CHANGE UP (ref 3.5–5.3)
POTASSIUM SERPL-SCNC: 4 MMOL/L — SIGNIFICANT CHANGE UP (ref 3.5–5.3)
PROT SERPL-MCNC: 7.4 G/DL — SIGNIFICANT CHANGE UP (ref 6–8.3)
RBC # BLD: 4.14 M/UL — SIGNIFICANT CHANGE UP (ref 3.8–5.2)
RBC # FLD: 11.8 % — SIGNIFICANT CHANGE UP (ref 10.3–14.5)
SODIUM SERPL-SCNC: 137 MMOL/L — SIGNIFICANT CHANGE UP (ref 135–145)
WBC # BLD: 10 K/UL — SIGNIFICANT CHANGE UP (ref 3.8–10.5)
WBC # FLD AUTO: 10 K/UL — SIGNIFICANT CHANGE UP (ref 3.8–10.5)

## 2019-03-06 PROCEDURE — 96374 THER/PROPH/DIAG INJ IV PUSH: CPT

## 2019-03-06 PROCEDURE — 84436 ASSAY OF TOTAL THYROXINE: CPT

## 2019-03-06 PROCEDURE — 93005 ELECTROCARDIOGRAM TRACING: CPT

## 2019-03-06 PROCEDURE — 99284 EMERGENCY DEPT VISIT MOD MDM: CPT | Mod: GC

## 2019-03-06 PROCEDURE — 85027 COMPLETE CBC AUTOMATED: CPT

## 2019-03-06 PROCEDURE — 80053 COMPREHEN METABOLIC PANEL: CPT

## 2019-03-06 PROCEDURE — 99285 EMERGENCY DEPT VISIT HI MDM: CPT | Mod: 25

## 2019-03-06 PROCEDURE — 84443 ASSAY THYROID STIM HORMONE: CPT

## 2019-03-06 RX ORDER — FAMOTIDINE 10 MG/ML
20 INJECTION INTRAVENOUS ONCE
Qty: 0 | Refills: 0 | Status: COMPLETED | OUTPATIENT
Start: 2019-03-06 | End: 2019-03-06

## 2019-03-06 RX ORDER — ACETAMINOPHEN 500 MG
650 TABLET ORAL ONCE
Qty: 0 | Refills: 0 | Status: COMPLETED | OUTPATIENT
Start: 2019-03-06 | End: 2019-03-06

## 2019-03-06 RX ORDER — BUMETANIDE 0.25 MG/ML
1 INJECTION INTRAMUSCULAR; INTRAVENOUS ONCE
Qty: 0 | Refills: 0 | Status: DISCONTINUED | OUTPATIENT
Start: 2019-03-06 | End: 2019-03-06

## 2019-03-06 RX ORDER — FAMOTIDINE 10 MG/ML
1 INJECTION INTRAVENOUS
Qty: 20 | Refills: 0
Start: 2019-03-06 | End: 2019-03-15

## 2019-03-06 RX ADMIN — Medication 30 MILLILITER(S): at 21:58

## 2019-03-06 RX ADMIN — Medication 650 MILLIGRAM(S): at 21:58

## 2019-03-06 RX ADMIN — FAMOTIDINE 20 MILLIGRAM(S): 10 INJECTION INTRAVENOUS at 20:57

## 2019-03-06 NOTE — ED PROVIDER NOTE - OBJECTIVE STATEMENT
73F w/ PMH  diverticulosis, collagenous colitis, Emmanuel's esophagus, asthmatic bronchitis, GERD and ulders in the past and PND syndrome p/w 2 episodes of coffee ground emesis and burning epigastric pain since 3pm today while at work. Reports SOB and diaphoresis when she was vomiting. Denies SOB otherwise. States her pain feels the same as her previous GERD symptoms. Denies fevers, diarrhea, blood in stool, melena, dysuria, hematuria.

## 2019-03-06 NOTE — ED ADULT NURSE NOTE - PAIN RATING/NUMBER SCALE (0-10): ACTIVITY
Blood pressure is uncontrolled  -180  started home medications like lisinopril, atenolol and lasix 0

## 2019-03-06 NOTE — ED ADULT NURSE NOTE - NSIMPLEMENTINTERV_GEN_ALL_ED
Implemented All Universal Safety Interventions:  [place] to call system. Call bell, personal items and telephone within reach. Instruct patient to call for assistance. Room bathroom lighting operational. Non-slip footwear when patient is off stretcher. Physically safe environment: no spills, clutter or unnecessary equipment. Stretcher in lowest position, wheels locked, appropriate side rails in place.

## 2019-03-06 NOTE — ED ADULT NURSE NOTE - OBJECTIVE STATEMENT
74y/o female with history of gastric ulcers, walked into ED a&ox3 c/o vomiting. Patient reports she has had a very stressful past few days. States today she had 2 episodes of coffee ground emesis. Reports she drove herself here and while driving felt very dizzy and was worried she may pass out. Also endorses "burning" sensation to chest. Denies HA, vision changes, abdominal pain, diarrhea, fever, recent travel, numbness/tingling. C/o mild nausea currently. Lungs clear b/l. Abd soft, nontender, nondistended. MD at bedside for eval.

## 2019-03-06 NOTE — ED PROVIDER NOTE - CLINICAL SUMMARY MEDICAL DECISION MAKING FREE TEXT BOX
73F w/ previous ulcers p/w coffee ground emesis, epigastric burning pain, likely 2/2 to new ulcer/GERD, will treat symptomatically, check CBC for anemia and transfuse as appropriate 73F w/ previous ulcers p/w coffee ground emesis, epigastric burning pain, likely 2/2 to new ulcer/GERD, will treat symptomatically, check CBC for anemia and transfuse as appropriate  Mariano: vomited blood. long hx of stomach issues on PPI and H2. will watch and check hct.  Patient understands her disease. feels well now.

## 2019-03-06 NOTE — ED PROVIDER NOTE - NSFOLLOWUPINSTRUCTIONS_ED_ALL_ED_FT
d You were seen for mid abdominal pain and dark vomiting. Please follow up with your gastroenterologist in 2-4 days. Return to the ED if you have worsening vomiting. You have increased bleeding, lightheadedness, or other new symptoms. Take the pepcid as prescribed

## 2019-03-06 NOTE — ED PROVIDER NOTE - PROGRESS NOTE DETAILS
Justice: patient feeling better after treatment, spoke to patient who agrees with follow up, return precautions provided Justice: R BP 170s/80s, L BP 160s/80s

## 2019-03-06 NOTE — ED PROVIDER NOTE - NS ED ROS FT
General: denies fever, fatigue  HENT: denies nasal congestion, rhinorrhea  Neck: denies neck pain, neck stiffness  CV: denies chest pain, palpitations  Resp: denies difficulty breathing, cough  Abdominal: + vomiting, denies diarrhea, abdominal pain, blood in stool, dark stool  MSK: denies leg pain, leg swelling  Neuro: denies headaches, numbness

## 2019-03-06 NOTE — ED PROVIDER NOTE - ATTENDING CONTRIBUTION TO CARE
I performed a history and physical exam of the patient and discussed their management with the resident and /or advanced care provider. I reviewed the resident and /or ACP's note and agree with the documented findings and plan of care. My medical decison making and observations are found above.  Abd soft, awake alert feels ok

## 2019-03-06 NOTE — ED PROVIDER NOTE - PHYSICAL EXAMINATION
CONSTITUTIONAL: NAD, awake, alert  HEAD: Normocephalic, atraumatic  ENMT: External appears normal, MMM  NECK: Supple; non-tender, full ROM  CARD: Normal Sl, S2; no audible murmurs  RESP: Breathing comfortably on RA, normal wob, CTAB  ABD: Soft, non-distended; non-tender  EXT: No edema, normal ROM in all four extremities  SKIN: Warm, dry, no rashes  NEURO: aaox3, moving all extremities spontaneously
Patient declined information

## 2019-03-07 LAB
T4 AB SER-ACNC: 6.6 UG/DL — SIGNIFICANT CHANGE UP (ref 4.6–12)
TSH SERPL-MCNC: 1.8 UIU/ML — SIGNIFICANT CHANGE UP (ref 0.27–4.2)

## 2019-04-02 ENCOUNTER — APPOINTMENT (OUTPATIENT)
Dept: GASTROENTEROLOGY | Facility: CLINIC | Age: 74
End: 2019-04-02
Payer: MEDICARE

## 2019-04-02 VITALS
TEMPERATURE: 97.8 F | RESPIRATION RATE: 17 BRPM | WEIGHT: 153 LBS | DIASTOLIC BLOOD PRESSURE: 72 MMHG | SYSTOLIC BLOOD PRESSURE: 140 MMHG | HEIGHT: 64 IN | BODY MASS INDEX: 26.12 KG/M2 | HEART RATE: 74 BPM | OXYGEN SATURATION: 97 %

## 2019-04-02 PROCEDURE — 99214 OFFICE O/P EST MOD 30 MIN: CPT

## 2019-04-02 NOTE — ASSESSMENT
[FreeTextEntry1] : 73-year-old female history of acid reflux presents following hospitalization, emergency room visit for abrupt onset nausea vomiting questionable hematemesis versus discolored vomit from pastry.\par Complaints rapidly resolved, no evidence of persistent gastrointestinal bleed\par Continue good control of her daily reflux complaints\par No indication at this time for repeat upper endoscopy\par \par Plan\par Continue current medications\par Office visit one year

## 2019-04-02 NOTE — HISTORY OF PRESENT ILLNESS
[de-identified] : 73-year-old female history of acid reflux last upper endoscopy performed April 2018 with bravo pH monitor presents for followup, recent emergency room visit for vomiting.\par Patient states that she began having nausea and vomiting at work\par Concern that she may have vomited up blood,\par Presented to the emergency room\par Denied black or bloody bowel movements\par Found to have extremely elevated blood pressure, improvement of nausea and vomiting with blood pressure control\par Severe burning sensation in her chest\par Discharged home to continue her proton pump inhibitor, Zantac\par Patient asymptomatic at this time\par \par Denies any dysphagia

## 2019-04-12 ENCOUNTER — APPOINTMENT (OUTPATIENT)
Dept: PULMONOLOGY | Facility: CLINIC | Age: 74
End: 2019-04-12
Payer: MEDICARE

## 2019-04-12 ENCOUNTER — RX RENEWAL (OUTPATIENT)
Age: 74
End: 2019-04-12

## 2019-04-12 ENCOUNTER — NON-APPOINTMENT (OUTPATIENT)
Age: 74
End: 2019-04-12

## 2019-04-12 VITALS
RESPIRATION RATE: 16 BRPM | SYSTOLIC BLOOD PRESSURE: 124 MMHG | HEART RATE: 67 BPM | DIASTOLIC BLOOD PRESSURE: 70 MMHG | OXYGEN SATURATION: 98 % | WEIGHT: 145 LBS | BODY MASS INDEX: 26.68 KG/M2 | HEIGHT: 62 IN

## 2019-04-12 PROCEDURE — 99214 OFFICE O/P EST MOD 30 MIN: CPT | Mod: 25

## 2019-04-12 PROCEDURE — 71046 X-RAY EXAM CHEST 2 VIEWS: CPT

## 2019-04-12 PROCEDURE — 94010 BREATHING CAPACITY TEST: CPT

## 2019-04-12 NOTE — PROCEDURE
[FreeTextEntry1] : CXR reveals a normal sized heart; no evidence of infiltrate or effusion--a normal appearing chest radiograph. \par \par PFT- spi reveals normal flows; FEV1 was 2.27 L which is 114% of predicted; normal flow volume loop.

## 2019-04-12 NOTE — PHYSICAL EXAM
[General Appearance - Well Developed] : well developed [Normal Appearance] : normal appearance [Well Groomed] : well groomed [General Appearance - Well Nourished] : well nourished [No Deformities] : no deformities [General Appearance - In No Acute Distress] : no acute distress [Normal Conjunctiva] : the conjunctiva exhibited no abnormalities [Eyelids - No Xanthelasma] : the eyelids demonstrated no xanthelasmas [Normal Oropharynx] : normal oropharynx [Neck Appearance] : the appearance of the neck was normal [Neck Cervical Mass (___cm)] : no neck mass was observed [Jugular Venous Distention Increased] : there was no jugular-venous distention [Thyroid Diffuse Enlargement] : the thyroid was not enlarged [Thyroid Nodule] : there were no palpable thyroid nodules [Heart Rate And Rhythm] : heart rate and rhythm were normal [Heart Sounds] : normal S1 and S2 [Murmurs] : no murmurs present [Respiration, Rhythm And Depth] : normal respiratory rhythm and effort [Exaggerated Use Of Accessory Muscles For Inspiration] : no accessory muscle use [Abdomen Soft] : soft [Abdomen Tenderness] : non-tender [Abdomen Mass (___ Cm)] : no abdominal mass palpated [Abnormal Walk] : normal gait [Gait - Sufficient For Exercise Testing] : the gait was sufficient for exercise testing [Cyanosis, Localized] : no localized cyanosis [Nail Clubbing] : no clubbing of the fingernails [Petechial Hemorrhages (___cm)] : no petechial hemorrhages [Skin Color & Pigmentation] : normal skin color and pigmentation [] : no rash [No Venous Stasis] : no venous stasis [No Skin Ulcers] : no skin ulcer [Skin Lesions] : no skin lesions [Deep Tendon Reflexes (DTR)] : deep tendon reflexes were 2+ and symmetric [No Xanthoma] : no  xanthoma was observed [Sensation] : the sensory exam was normal to light touch and pinprick [No Focal Deficits] : no focal deficits [Impaired Insight] : insight and judgment were intact [Oriented To Time, Place, And Person] : oriented to person, place, and time [Affect] : the affect was normal [II] : II [FreeTextEntry1] : I:E 1:3; mild expiratory wheezes bilaterally.

## 2019-04-12 NOTE — ASSESSMENT
[FreeTextEntry1] : Ms. Phipps is a 73 year old female with a history of recently diverticulosis, GERD, collagenous colitis, asthma, allergies, TBM, OSAS who now comes  ?PNA complicated by tracheomalacia. She is s/p tracheoplasty with bronchospasm. - now asthmatic-type bronchitis and GERD.\par \par Her cough and shortness of breath is related to:\par -acquired tracheobronchomalacia \par -asthma  - Active\par -abnormal hypersensitivity panel \par -post nasal drip syndrome\par -GERD (Active)\par \par \par problem 1: severe persistent asthma - Active\par -add Prednisone 30mg x 5 days then 20mg x 5 days then 10mg x 5 days\par Information sheet given to the patient to be reviewed, this medication is never to be used without consulting the prescribing physician. Proper dietary restraint is necessary specifically salt containing foods, if any reaction may occur should be reported.\par \par -continue to use Xopenex 1.25 via nebulizer Q8H (in the morning)\par -continue to use Pulmicort 0.5 BID (after Xopenex)\par -followed by Spiriva 1 inhalation QD\par -continue to use Singulair 10 mg QHS\par \par -Asthma is believed to be caused by inherited (genetic) and environmental factor, but its exact cause is unknown. Asthma may be triggered by allergens, lung infections, or irritants in the air. Asthma triggers are different for each person\par -Inhaler technique reviewed as well as oral hygiene techniques reviewed with patient. Avoidance of cold air, extremes of temperature, rescue inhaler should be used before exercise. Order of medication reviewed with patient. Recommended use of a cool mist humidifier in the bedroom. \par \par problem 3: sensory neuropathic cough\par -transition to Elavil 25 mg up to TID- QHS- on the shelf \par -initiate Baclofen 10mg pre-meal\par \par -Sensory neuropathic cough is an etiology of cough that is often realized once someone has been ruled out for common disease such as: asthma, COPD, eosinophilic bronchitis, bronchiectasis, post nasal drip, and GERD. It sometimes develops following a URI, herpes zoster outbreak in pharynx or thyroid or cervical spine injury. However, many patients have no identifiable antecedent explanation. \par \par problem 4: tracheobronchomalacia\par -s/p surgery with Dr. Boyd Ramirez in 11/2017\par -s/p bronchoscopy with Dr. Ramirez \par \par Tracheomalacia is usually acquired in adults and common causes include damage by tracheostomy or endotracheal intubation damaging the tracheal cartilage with increase risk with multiple intubations, prolonged intubation, and concurrent high dose steroid therapy; external chest wall trauma and surgery; chronic compression of the trachea by benign etiologies (eg, benign mediastinal goiter) or malignancy; relapsing polychondritis; or recurrent infection. Tracheomalacia can be asymptomatic, however signs or symptoms can develop as the severity of the airway narrowing progresses with major symptoms include dyspnea, cough, and sputum retention. Other symptoms include severe paroxysms of coughing, wheezing or stridor, barking cough and may be exacerbated by forced expiration, cough, and Valsalva maneuver. Tracheomalacia is diagnosed by a bronchoscopic visualization of dynamic airway collapse on dynamic chest CT. Therapy is warranted in symptomatic patients with severe tracheomalacia and includes surgical repair as tracheobronchoplasty. The patient was referred to Dr. aMtt Ramirez or Dr. Tai Kohli, at [facility] for a surgical consult.\par \par problem 5: abnormal hypersensitivity panel/allergic panel\par -s/p allergist evaluation - no treatment recommended at this time\par \par -Environmental measures for allergies were encouraged including mattress and pillow cover, air purifier, and environmental controls. \par \par Problem 6: s/p mycoplasma pneumonia \par -s/p prolonged Zithromax for anti inflammatory\par \par problem 7: overweight\par -Weight loss, exercise, and diet control were discussed and are highly encouraged. Treatment options were given such as, aqua therapy, and contacting a nutritionist. Recommended to use the elliptical, stationary bike, less use of treadmill. Obesity is associated with worsening asthma, shortness of breath, and potential for cardiac disease, diabetes, and other underlying medical conditions.\par \par problem 8: GERD/LPR\par -initiate Baclofen 10mg pre-meal\par -continue to use Protonix 40 mg before breakfast\par -continue to use Zantac 300 mg QHS\par \par -Rule of 2s: avoid eating too much, eating too late, eating too spicy, eating two hours before bed\par -Things to avoid including overeating, spicy foods, tight clothing, eating within three hours of bed, this list is not all inclusive. \par -For treatment of reflux, possible options discussed including diet control, H2 blockers, PPIs, as well as coating motility agents discussed as treatment options. Timing of meals and proximity of last meal to sleep were discussed. If symptoms persist, a formal gastrointestinal evaluation is needed. \par \par problem 9: dysphonia\par -recommended to use Fisherman's Friend Lozengers\par -felt to be related to cough causing vocal cord trauma, LPR, medication effect\par -recommended hydration and time \par \par problem 10: allergic rhinitis\par -recommended xlear saline \par -recommended OTC antihistamine PRN (Claritin 10 mg QAM)\par \par -Environmental measures for allergies were encouraged including mattress and pillow cover, air purifier, and environmental controls.\par \par problem 11: SUSAN\par -recommended to take nocturnal medications earlier; OxyAid\par -recommended to have a mouth piece made - denied; failed CPAP\par -based on her Nunez's esophagus, EDS, ? snoring, elevated mallampati class, and poor sleep; she is being set up for an at home sleep study \par \par Sleep apnea is associated with adverse clinical consequences which an affect most organ systems. Cardiovascular disease risk includes arrhythmias, atrial fibrillation, hypertension, coronary artery disease, and stroke. Metabolic disorders include diabetes type 2, non-alcoholic fatty liver disease. Mood disorder especially depression; and cognitive decline especially in the elderly. Associations with chronic reflux/Nunez’s esophagus some but not all inclusive. \par -Reasons include arousal consistent with hypopnea; respiratory events most prominent in REM sleep or supine position; therefore sleep staging and body position are important for accurate diagnosis and estimation of AHI.\par -Good sleep hygiene was encouraged including avoiding watching television an hour before bed, keeping caffeine at a low, avoiding reading, television, or anything, in bed, no drinking any liquids three hours before bedtime, and only getting into bed when tired and ready for sleep. \par \par problem 12: health maintenance \par -recommended a yearly flu shot after October 15 2018\par -recommended strep pneumonia vaccines: Prevnar-13 vaccine, followed by Pneumo vaccine 23 on year following\par -recommended early intervention for URIs\par -recommended osteoporosis evaluations\par -recommended early dermatological evaluations\par -recommended after the age of 50 to the age of 70, colonoscopy every 5 years\par \par F/U in 3 months\par She is encouraged to call with any changes, concerns, or questions.

## 2019-04-12 NOTE — HISTORY OF PRESENT ILLNESS
[FreeTextEntry1] : Ms. AMBROCIO is a 73 year year old female with a history of recently diverticulosis, GERD, collagenous colitis, asthma, allergies, TBM, OSAS who presents for a follow-up pulmonary evaluation. Her chief complaint is sick visit / cough\par -her cough started 3 weeks or more ago but she does not think she got sick but maybe had a little cold\par -recently she has been nauseous\par -her cough is localized to her throat - not coming from her lungs\par -she denies any swallowing issues\par -occasionally she has GERD controlled with Pantoprazole and Ranitidine\par -sometimes she produces a hard mucus (not colored) - not always but sometimes\par -she does not sleep well not only due to this cough but in general - she wakes up many times during the night but she sleeps better if she has a glass of wine at night\par -she says she never sleeps during the day\par -sometimes her sinuses are drippy - especially when her grandchildren are around\par -she denies any headaches, vomiting, fever, chills, sweats, chest pain, chest pressure, diarrhea, constipation, dysphagia, dizziness, leg swelling, leg pain, itchy eyes, itchy ears, or sour taste in the mouth.

## 2019-04-12 NOTE — REASON FOR VISIT
[Follow-Up] : a follow-up visit [FreeTextEntry1] : sick visit - recently diverticulosis, GERD, collagenous colitis, asthma, allergies, TBM, OSAS

## 2019-04-12 NOTE — ADDENDUM
[FreeTextEntry1] : Documented by Phil Banerjee acting as a scribe for Dr. Denzel Saunders on 04/12/2019.\par All medical record entries made by the Scribe were at my, Dr. Denzel Saunders's, direction and personally dictated by me on 04/12/2019. I have reviewed the chart and agree that the record accurately reflects my personal performance of the history, physical exam, assessment and plan. I have also personally directed, reviewed, and agree with the discharge instructions.

## 2019-04-25 ENCOUNTER — NON-APPOINTMENT (OUTPATIENT)
Age: 74
End: 2019-04-25

## 2019-04-25 ENCOUNTER — APPOINTMENT (OUTPATIENT)
Dept: PULMONOLOGY | Facility: CLINIC | Age: 74
End: 2019-04-25
Payer: MEDICARE

## 2019-04-25 VITALS
BODY MASS INDEX: 26.87 KG/M2 | WEIGHT: 146 LBS | HEIGHT: 62 IN | DIASTOLIC BLOOD PRESSURE: 60 MMHG | RESPIRATION RATE: 14 BRPM | SYSTOLIC BLOOD PRESSURE: 125 MMHG | HEART RATE: 68 BPM | OXYGEN SATURATION: 99 %

## 2019-04-25 PROCEDURE — 94010 BREATHING CAPACITY TEST: CPT

## 2019-04-25 PROCEDURE — 99214 OFFICE O/P EST MOD 30 MIN: CPT | Mod: 25

## 2019-04-25 NOTE — PHYSICAL EXAM
[General Appearance - Well Developed] : well developed [Normal Appearance] : normal appearance [Well Groomed] : well groomed [General Appearance - Well Nourished] : well nourished [No Deformities] : no deformities [General Appearance - In No Acute Distress] : no acute distress [Normal Conjunctiva] : the conjunctiva exhibited no abnormalities [Eyelids - No Xanthelasma] : the eyelids demonstrated no xanthelasmas [Normal Oropharynx] : normal oropharynx [II] : II [Neck Appearance] : the appearance of the neck was normal [Jugular Venous Distention Increased] : there was no jugular-venous distention [Neck Cervical Mass (___cm)] : no neck mass was observed [Thyroid Diffuse Enlargement] : the thyroid was not enlarged [Thyroid Nodule] : there were no palpable thyroid nodules [Heart Rate And Rhythm] : heart rate and rhythm were normal [Heart Sounds] : normal S1 and S2 [Murmurs] : no murmurs present [Respiration, Rhythm And Depth] : normal respiratory rhythm and effort [Exaggerated Use Of Accessory Muscles For Inspiration] : no accessory muscle use [Auscultation Breath Sounds / Voice Sounds] : lungs were clear to auscultation bilaterally [Abdomen Soft] : soft [Abdomen Tenderness] : non-tender [Abdomen Mass (___ Cm)] : no abdominal mass palpated [Abnormal Walk] : normal gait [Nail Clubbing] : no clubbing of the fingernails [Gait - Sufficient For Exercise Testing] : the gait was sufficient for exercise testing [Cyanosis, Localized] : no localized cyanosis [Petechial Hemorrhages (___cm)] : no petechial hemorrhages [Skin Color & Pigmentation] : normal skin color and pigmentation [] : no rash [No Venous Stasis] : no venous stasis [Skin Lesions] : no skin lesions [No Skin Ulcers] : no skin ulcer [No Xanthoma] : no  xanthoma was observed [Deep Tendon Reflexes (DTR)] : deep tendon reflexes were 2+ and symmetric [Sensation] : the sensory exam was normal to light touch and pinprick [No Focal Deficits] : no focal deficits [Oriented To Time, Place, And Person] : oriented to person, place, and time [Impaired Insight] : insight and judgment were intact [Affect] : the affect was normal [FreeTextEntry1] : I:E 1:3; clear

## 2019-04-25 NOTE — HISTORY OF PRESENT ILLNESS
[FreeTextEntry1] : Ms. AMBROCIO is a 73 year year old female with a history of recently diverticulosis, GERD, collagenous colitis, asthma, allergies, TBM, OSAS who presents for a follow-up pulmonary evaluation. Her chief complaint is cough and reflux\par -she reports she is feeling improved today\par -she notes her energy levels are better\par -she states her cough has significantly improved but she is still with mild residual cough\par -she reports her bowels are currently irregular\par -she notes she has lost weight recently\par -she states her sinuses are clear\par -she reports she is not sleeping well as her sleep is broken and is unable to reinitiate sleep\par -she notes her reflux is active and worsening  even with taking Zantac every night\par -she denies any chest pain, chest pressure, dysuria, dysphagia, dizziness, sour taste in the mouth

## 2019-04-25 NOTE — ADDENDUM
[FreeTextEntry1] : Documented by Omar Mahmood acting as a scribe for Dr. Denzel Saunders on 04/25/2019 \par \par All medical record entries made by the Scribe were at my, Dr. Denzel Saunders's, direction and personally dictated by me on 04/25/2019  . I have reviewed the chart and agree that the record accurately reflects my personal performance of the history, physical exam, procedure, assessment and plan. I have also personally directed, reviewed, and agree with the discharge instructions. \par \par

## 2019-04-25 NOTE — REVIEW OF SYSTEMS
[Recent Wt Loss (___ Lbs)] : recent [unfilled] ~Ulb weight loss [Cough] : cough [Reflux] : reflux [As Noted in HPI] : as noted in HPI [Difficulty Initiating Sleep] : difficulty falling asleep [Negative] : Pulmonary Hypertension

## 2019-04-25 NOTE — ASSESSMENT
[FreeTextEntry1] : Ms. Phipps is a 73 year old female with a history of recently diverticulosis, GERD, collagenous colitis, asthma, allergies, TBM, OSAS who now comes  ?PNA complicated by tracheomalacia. She is s/p tracheoplasty with bronchospasm. - s/p asthmatic-type bronchitis and GERD.\par \par Her cough and shortness of breath is related to:\par -acquired tracheobronchomalacia \par -asthma  - Active\par -abnormal hypersensitivity panel \par -post nasal drip syndrome\par -GERD (Active)\par \par \par problem 1: severe persistent asthma - s/p flair \par -continue to use Xopenex 1.25 via nebulizer Q8H (in the morning)\par -continue to use Pulmicort 0.5 BID (after Xopenex)\par -followed by Spiriva 1 inhalation QD\par -continue to use Singulair 10 mg QHS\par \par -Asthma is believed to be caused by inherited (genetic) and environmental factor, but its exact cause is unknown. Asthma may be triggered by allergens, lung infections, or irritants in the air. Asthma triggers are different for each person\par -Inhaler technique reviewed as well as oral hygiene techniques reviewed with patient. Avoidance of cold air, extremes of temperature, rescue inhaler should be used before exercise. Order of medication reviewed with patient. Recommended use of a cool mist humidifier in the bedroom. \par \par problem 3: sensory neuropathic cough\par -off Elavil 25 mg up to TID- QHS- on the shelf \par -initiate Baclofen 10mg pre-meal, PRN\par \par -Sensory neuropathic cough is an etiology of cough that is often realized once someone has been ruled out for common disease such as: asthma, COPD, eosinophilic bronchitis, bronchiectasis, post nasal drip, and GERD. It sometimes develops following a URI, herpes zoster outbreak in pharynx or thyroid or cervical spine injury. However, many patients have no identifiable antecedent explanation. \par \par problem 4: tracheobronchomalacia\par -s/p surgery with Dr. Boyd Ramirez in 11/2017\par -s/p bronchoscopy with Dr. Ramirez \par \par Tracheomalacia is usually acquired in adults and common causes include damage by tracheostomy or endotracheal intubation damaging the tracheal cartilage with increase risk with multiple intubations, prolonged intubation, and concurrent high dose steroid therapy; external chest wall trauma and surgery; chronic compression of the trachea by benign etiologies (eg, benign mediastinal goiter) or malignancy; relapsing polychondritis; or recurrent infection. Tracheomalacia can be asymptomatic, however signs or symptoms can develop as the severity of the airway narrowing progresses with major symptoms include dyspnea, cough, and sputum retention. Other symptoms include severe paroxysms of coughing, wheezing or stridor, barking cough and may be exacerbated by forced expiration, cough, and Valsalva maneuver. Tracheomalacia is diagnosed by a bronchoscopic visualization of dynamic airway collapse on dynamic chest CT. Therapy is warranted in symptomatic patients with severe tracheomalacia and includes surgical repair as tracheobronchoplasty. The patient was referred to Dr. Matt Ramirez or Dr. Tai Kohli, at [facility] for a surgical consult.\par \par problem 5: abnormal hypersensitivity panel/allergic panel\par -s/p allergist evaluation - no treatment recommended at this time\par \par -Environmental measures for allergies were encouraged including mattress and pillow cover, air purifier, and environmental controls. \par \par Problem 6: s/p mycoplasma pneumonia \par -s/p prolonged Zithromax for anti inflammatory\par \par problem 7: overweight\par -Weight loss, exercise, and diet control were discussed and are highly encouraged. Treatment options were given such as, aqua therapy, and contacting a nutritionist. Recommended to use the elliptical, stationary bike, less use of treadmill. Obesity is associated with worsening asthma, shortness of breath, and potential for cardiac disease, diabetes, and other underlying medical conditions.\par \par problem 8: GERD/LPR\par -initiate Baclofen 10mg pre-meal\par -continue to use Protonix 40 mg before breakfast\par -continue to use Zantac 300 mg QHS\par -recommended [employer] Probiotic supplement \par \par -Rule of 2s: avoid eating too much, eating too late, eating too spicy, eating two hours before bed\par -Things to avoid including overeating, spicy foods, tight clothing, eating within three hours of bed, this list is not all inclusive. \par -For treatment of reflux, possible options discussed including diet control, H2 blockers, PPIs, as well as coating motility agents discussed as treatment options. Timing of meals and proximity of last meal to sleep were discussed. If symptoms persist, a formal gastrointestinal evaluation is needed. \par \par problem 9: dysphonia\par -recommended to use Fisher[employer]'s Friend Lozengers\par -felt to be related to cough causing vocal cord trauma, LPR, medication effect\par -recommended hydration and time \par \par problem 10: allergic rhinitis\par -recommended xlear saline \par -recommended OTC antihistamine PRN (Claritin 10 mg QAM)\par \par -Environmental measures for allergies were encouraged including mattress and pillow cover, air purifier, and environmental controls.\par \par problem 11: SUSAN\par -recommended to take nocturnal medications earlier; OxyAid\par -recommended to have a mouth piece made - denied; failed CPAP\par -based on her Nunez's esophagus, EDS, ? snoring, elevated mallampati class, and poor sleep; she is being set up for an at home sleep study \par \par Sleep apnea is associated with adverse clinical consequences which an affect most organ systems. Cardiovascular disease risk includes arrhythmias, atrial fibrillation, hypertension, coronary artery disease, and stroke. Metabolic disorders include diabetes type 2, non-alcoholic fatty liver disease. Mood disorder especially depression; and cognitive decline especially in the elderly. Associations with chronic reflux/Nunez’s esophagus some but not all inclusive. \par -Reasons include arousal consistent with hypopnea; respiratory events most prominent in REM sleep or supine position; therefore sleep staging and body position are important for accurate diagnosis and estimation of AHI.\par -Good sleep hygiene was encouraged including avoiding watching television an hour before bed, keeping caffeine at a low, avoiding reading, television, or anything, in bed, no drinking any liquids three hours before bedtime, and only getting into bed when tired and ready for sleep. \par \par problem 12: health maintenance \par -recommended a yearly flu shot after October 15 2018\par -recommended strep pneumonia vaccines: Prevnar-13 vaccine, followed by Pneumo vaccine 23 on year following\par -recommended early intervention for URIs\par -recommended osteoporosis evaluations\par -recommended early dermatological evaluations\par -recommended after the age of 50 to the age of 70, colonoscopy every 5 years\par \par F/U in 3-4 months\par She is encouraged to call with any changes, concerns, or questions.

## 2019-04-25 NOTE — PROCEDURE
[FreeTextEntry1] : PFT revealed normal flows, with a FEV1 of 2.23 L, which is 112% of predicted, with a normal flow volume loop\par

## 2019-05-28 ENCOUNTER — APPOINTMENT (OUTPATIENT)
Dept: GASTROENTEROLOGY | Facility: CLINIC | Age: 74
End: 2019-05-28

## 2019-07-02 NOTE — H&P PST ADULT - CLICK TO LAUNCH ORM
. Lazy S Intermediate Repair Preamble Text (Leave Blank If You Do Not Want): Undermining was performed with blunt dissection.

## 2019-07-11 ENCOUNTER — APPOINTMENT (OUTPATIENT)
Dept: THORACIC SURGERY | Facility: CLINIC | Age: 74
End: 2019-07-11
Payer: MEDICARE

## 2019-07-16 ENCOUNTER — NON-APPOINTMENT (OUTPATIENT)
Age: 74
End: 2019-07-16

## 2019-07-16 ENCOUNTER — APPOINTMENT (OUTPATIENT)
Dept: PULMONOLOGY | Facility: CLINIC | Age: 74
End: 2019-07-16
Payer: MEDICARE

## 2019-07-16 VITALS
DIASTOLIC BLOOD PRESSURE: 60 MMHG | OXYGEN SATURATION: 97 % | RESPIRATION RATE: 14 BRPM | HEIGHT: 62 IN | BODY MASS INDEX: 25.58 KG/M2 | SYSTOLIC BLOOD PRESSURE: 124 MMHG | HEART RATE: 77 BPM | WEIGHT: 139 LBS

## 2019-07-16 PROCEDURE — 99214 OFFICE O/P EST MOD 30 MIN: CPT | Mod: 25

## 2019-07-16 PROCEDURE — 94010 BREATHING CAPACITY TEST: CPT

## 2019-07-16 NOTE — ASSESSMENT
[FreeTextEntry1] : Ms. Phipps is a 73 year old female with a history of recently diverticulosis, GERD, collagenous colitis, asthma, allergies, TBM, OSAS who now comes  ?PNA complicated by tracheomalacia. She is s/p tracheoplasty with bronchospasm. - s/p asthmatic type bronchitis and GERD. She is currently stable from a pulmonary perspective; her number one issue is poor sleep. \par \par Her cough and shortness of breath is related to:\par -acquired tracheobronchomalacia \par -asthma  - Active\par -abnormal hypersensitivity panel \par -post nasal drip syndrome\par -GERD (Active)\par \par \par problem 1: severe persistent asthma\par -continue to use Xopenex 1.25 via nebulizer Q8H (in the morning)\par -continue to use Pulmicort 0.5 BID (after Xopenex)\par -followed by Spiriva 1 inhalation QD\par -continue to use Singulair 10 mg QHS\par \par -Asthma is believed to be caused by inherited (genetic) and environmental factor, but its exact cause is unknown. Asthma may be triggered by allergens, lung infections, or irritants in the air. Asthma triggers are different for each person\par -Inhaler technique reviewed as well as oral hygiene techniques reviewed with patient. Avoidance of cold air, extremes of temperature, rescue inhaler should be used before exercise. Order of medication reviewed with patient. Recommended use of a cool mist humidifier in the bedroom. \par \par problem 3: sensory neuropathic cough\par -off Elavil 25 mg up to TID- QHS- on the shelf \par -continue Baclofen 10mg pre-meal, PRN\par \par -Sensory neuropathic cough is an etiology of cough that is often realized once someone has been ruled out for common disease such as: asthma, COPD, eosinophilic bronchitis, bronchiectasis, post nasal drip, and GERD. It sometimes develops following a URI, herpes zoster outbreak in pharynx or thyroid or cervical spine injury. However, many patients have no identifiable antecedent explanation. \par \par problem 4: tracheobronchomalacia\par -s/p surgery with Dr. Boyd Ramirez in 11/2017\par -s/p bronchoscopy with Dr. Ramirez \par \par Tracheomalacia is usually acquired in adults and common causes include damage by tracheostomy or endotracheal intubation damaging the tracheal cartilage with increase risk with multiple intubations, prolonged intubation, and concurrent high dose steroid therapy; external chest wall trauma and surgery; chronic compression of the trachea by benign etiologies (eg, benign mediastinal goiter) or malignancy; relapsing polychondritis; or recurrent infection. Tracheomalacia can be asymptomatic, however signs or symptoms can develop as the severity of the airway narrowing progresses with major symptoms include dyspnea, cough, and sputum retention. Other symptoms include severe paroxysms of coughing, wheezing or stridor, barking cough and may be exacerbated by forced expiration, cough, and Valsalva maneuver. Tracheomalacia is diagnosed by a bronchoscopic visualization of dynamic airway collapse on dynamic chest CT. Therapy is warranted in symptomatic patients with severe tracheomalacia and includes surgical repair as tracheobronchoplasty. The patient was referred to Dr. Matt Ramirez or Dr. Tai Kohli, at [facility] for a surgical consult.\par \par problem 5: abnormal hypersensitivity panel/allergic panel\par -s/p allergist evaluation - no treatment recommended at this time\par \par -Environmental measures for allergies were encouraged including mattress and pillow cover, air purifier, and environmental controls. \par \par Problem 6: s/p mycoplasma pneumonia \par -s/p prolonged Zithromax for anti inflammatory\par \par problem 7: overweight\par -Weight loss, exercise, and diet control were discussed and are highly encouraged. Treatment options were given such as, aqua therapy, and contacting a nutritionist. Recommended to use the elliptical, stationary bike, less use of treadmill. Obesity is associated with worsening asthma, shortness of breath, and potential for cardiac disease, diabetes, and other underlying medical conditions.\par \par problem 8: GERD/LPR\par -initiate Baclofen 10mg pre-meal\par -continue to use Protonix 40 mg before breakfast\par -continue to use Zantac 300 mg QHS\par -recommended [employer] Probiotic supplement \par \par -Rule of 2s: avoid eating too much, eating too late, eating too spicy, eating two hours before bed\par -Things to avoid including overeating, spicy foods, tight clothing, eating within three hours of bed, this list is not all inclusive. \par -For treatment of reflux, possible options discussed including diet control, H2 blockers, PPIs, as well as coating motility agents discussed as treatment options. Timing of meals and proximity of last meal to sleep were discussed. If symptoms persist, a formal gastrointestinal evaluation is needed. \par \par problem 9: dysphonia\par -recommended to use [employer] Lozengers\par -felt to be related to cough causing vocal cord trauma, LPR, medication effect\par -recommended hydration and time \par \par problem 10: allergic rhinitis\par -recommended xlear saline \par -recommended OTC antihistamine PRN (Claritin 10 mg QAM)\par \par -Environmental measures for allergies were encouraged including mattress and pillow cover, air purifier, and environmental controls.\par \par problem 11: SUSAN\par -recommended to use "Chin Strap" \par -recommended to take nocturnal medications earlier; OxyAid\par -recommended to have a mouth piece made - denied; failed CPAP\par -based on her Nunez's esophagus, EDS, ? snoring, elevated mallampati class, and poor sleep; she is being set up for an at home sleep study \par \par Sleep apnea is associated with adverse clinical consequences which an affect most organ systems. Cardiovascular disease risk includes arrhythmias, atrial fibrillation, hypertension, coronary artery disease, and stroke. Metabolic disorders include diabetes type 2, non-alcoholic fatty liver disease. Mood disorder especially depression; and cognitive decline especially in the elderly. Associations with chronic reflux/Nunez’s esophagus some but not all inclusive. \par -Reasons include arousal consistent with hypopnea; respiratory events most prominent in REM sleep or supine position; therefore sleep staging and body position are important for accurate diagnosis and estimation of AHI.\par -Good sleep hygiene was encouraged including avoiding watching television an hour before bed, keeping caffeine at a low, avoiding reading, television, or anything, in bed, no drinking any liquids three hours before bedtime, and only getting into bed when tired and ready for sleep. \par \par problem 12: health maintenance \par -recommended a yearly flu shot after October 15 2018\par -recommended strep pneumonia vaccines: Prevnar-13 vaccine, followed by Pneumo vaccine 23 on year following\par -recommended early intervention for URIs\par -recommended osteoporosis evaluations\par -recommended early dermatological evaluations\par -recommended after the age of 50 to the age of 70, colonoscopy every 5 years\par \par F/U in 3-4 months\par She is encouraged to call with any changes, concerns, or questions.

## 2019-07-16 NOTE — ADDENDUM
[FreeTextEntry1] : All medical record entries made by zackary Singer were at Dr. Denzel Saunders's, direction and personally dictated by me on 07/16/2019. I have reviewed the chart and agree that the record accurately reflects my personal performance of the history, physical exam, assessment and plan. I have also personally directed, reviewed, and agree with the discharge instructions.

## 2019-07-16 NOTE — HISTORY OF PRESENT ILLNESS
[FreeTextEntry1] : Ms. Phipps is a 73 year old female with a history of allergic rhinitis, Nunez's esophagus, bronchoplasty/tracheoplasty, persistent cough, GERD, LPRD, SUSAN, TBM, and SOB presenting to the office today for a follow up visit. Her chief complaint is poor sleep. \par -She states that she has generally been feeling well aside from an occasional cough\par -she states that she has been under a lot of stress as she has recently moved to an apartment\par -she states that she has been sleeping poorly\par -she notes that she has lost some weight recently and feels comfortable\par -she states that her bowels have been regular\par -she reports that her sense of taste and smell have been good \par -she notes that she has not been exercising regularly, although she has been very active moving to her apartment\par -she reports a constant shoulder pain, and she has been following up regularly with her orthopedist \par -she denies any headaches, nausea, vomiting, fever, chills, sweats, chest pain, chest pressure, diarrhea, constipation, dysphagia, dizziness, leg swelling, leg pain, itchy eyes, itchy ears, heartburn, reflux, or sour taste in the mouth.

## 2019-07-16 NOTE — REASON FOR VISIT
[Follow-Up] : a follow-up visit [FreeTextEntry1] : allergic rhinitis, Nunez's esophagus, bronchoplasty/tracheoplasty, persistent cough, GERD, LPRD, SUSAN, TBM, and SOB

## 2019-07-16 NOTE — PROCEDURE
[FreeTextEntry1] : PFT - spi reveals normal flows; FEV1 is 2.27 which is 114% of predicted, normal flow volume loop

## 2019-07-18 ENCOUNTER — APPOINTMENT (OUTPATIENT)
Dept: THORACIC SURGERY | Facility: CLINIC | Age: 74
End: 2019-07-18
Payer: MEDICARE

## 2019-07-18 VITALS
WEIGHT: 136 LBS | DIASTOLIC BLOOD PRESSURE: 71 MMHG | RESPIRATION RATE: 18 BRPM | HEART RATE: 71 BPM | TEMPERATURE: 98 F | OXYGEN SATURATION: 96 % | BODY MASS INDEX: 25.03 KG/M2 | SYSTOLIC BLOOD PRESSURE: 156 MMHG | HEIGHT: 62 IN

## 2019-07-18 PROCEDURE — 99214 OFFICE O/P EST MOD 30 MIN: CPT

## 2019-07-18 NOTE — PHYSICAL EXAM
[Respiration, Rhythm And Depth] : normal respiratory rhythm and effort [Apical Impulse] : the apical impulse was normal [Heart Rate And Rhythm] : heart rate was normal and rhythm regular [Heart Sounds] : normal S1 and S2 [Heart Sounds Gallop] : no gallops [Examination Of The Chest] : the chest was normal in appearance [2+] : left 2+ [Skin Color & Pigmentation] : normal skin color and pigmentation [Skin Turgor] : normal skin turgor [] : no rash [Skin Lesions] : no skin lesions [No Focal Deficits] : no focal deficits [Oriented To Time, Place, And Person] : oriented to person, place, and time

## 2019-07-27 NOTE — HISTORY OF PRESENT ILLNESS
[FreeTextEntry1] : 73 year old female with a PMH of diverticulosis, Nunez's esophagus, asthmatic bronchitis, GERD/LPRD, post nasal drip syndrome, tracheobronchomalacia s/p s/p bronchoscopy, RIGHT video assisted thorascopic surgery, robotic assisted tracheobronchoplasty with Prolene mesh done on 11/1/17 presents today for a follow up visit with for symptom reevaluation.\par \par Patient's chief postoperative complaint is a persistent cough. She states the cough wakes her up at night, causes her to gag and become short of breath. She underwent an awake bronchoscopy on 3/16/18 which showed no evidence of airway collapse, however fluid was seen coming from the throat across the vocal cords and into the trachea.\par \par She was referred to Dr. Phil Clements for esophageal motility studies. On 5/17/18 she underwent esophageal manometry, which showed EGJ outflow obstruction with frequent weak/failed swallows. \par \par 48 hour ambulatory pH monitoring done on 4/26/18 showed a DeMeester score of 41.7 with moderate symptom correlation. \par \par CT Chest 1/24/19\par - Interval resolution of previously seen right lung base tree-in-bud opacities since 5/1-7\par - No new lung nodules\par - Mild bibasilar bronchiectasis as on the prior study\par - Coronary artery atherosclerotic disease\par \par Patient doing well. Denies SOB, cough, hemoptysis, chest discomfort, fever/chills.

## 2019-07-27 NOTE — ASSESSMENT
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 Very satisfied with the results of the surgery. Will follow-up here in 1 year for symptom reassessment. \par \par I have reviewed the patient's medical records and diagnostic images at the time of this office visit and have made the following recommendations\par Plan:\par 1. RTO in 1 year for symptom reassessment.

## 2019-07-27 NOTE — END OF VISIT
[FreeTextEntry3] : All medical record entries made by the Scribe were at my, Dr. Ramirez's direction and personally dictated by me on 07/18/2019 . I have reviewed the chart and agree that the record accurately reflects my personal performance of the history, physical exam, assessment and plan. I have also personally directed, reviewed, and agreed with the chart.

## 2019-09-09 ENCOUNTER — NON-APPOINTMENT (OUTPATIENT)
Age: 74
End: 2019-09-09

## 2019-09-09 ENCOUNTER — APPOINTMENT (OUTPATIENT)
Dept: PULMONOLOGY | Facility: CLINIC | Age: 74
End: 2019-09-09
Payer: MEDICARE

## 2019-09-09 VITALS
HEART RATE: 77 BPM | BODY MASS INDEX: 25.03 KG/M2 | WEIGHT: 136 LBS | SYSTOLIC BLOOD PRESSURE: 130 MMHG | OXYGEN SATURATION: 98 % | HEIGHT: 62 IN | RESPIRATION RATE: 18 BRPM | DIASTOLIC BLOOD PRESSURE: 60 MMHG

## 2019-09-09 PROCEDURE — 95012 NITRIC OXIDE EXP GAS DETER: CPT

## 2019-09-09 PROCEDURE — 99214 OFFICE O/P EST MOD 30 MIN: CPT | Mod: 25

## 2019-09-09 PROCEDURE — 94010 BREATHING CAPACITY TEST: CPT

## 2019-09-09 NOTE — ADDENDUM
[FreeTextEntry1] : Documented by Brad Harper acting as a scribe for Dr. Denzel Saunders on 09/09/2019.\par \par All medical record entries made by the Scribe were at my, Dr. Denzel Saunders's, direction and personally dictated by me on 09/09/2019. I have reviewed the chart and agree that the record accurately reflects my personal performance of the history, physical exam, assessment and plan. I have also personally directed, reviewed, and agree with the discharge instructions.

## 2019-09-09 NOTE — PROCEDURE
[FreeTextEntry1] : PFT revealed normal flows, with a FEV1 of 2.27L, which is 116% of predicted, with a normal flow volume loop\par \par FENO was 37; a normal value being less than 25\par Fractional exhaled nitric oxide (FENO) is regarded as a simple, noninvasive method for assessing eosinophilic airway inflammation. Produced by a variety of cells within the lung, nitric oxide (NO) concentrations are generally low in healthy individuals. However, high concentrations of NO appear to be involved in nonspecific host defense mechanisms and chronic inflammatory diseases such as asthma. The American Thoracic Society (ATS) therefore has recommended using FENO to aid in the diagnosis and monitoring of eosinophilic airway inflammation and asthma, and for identifying steroid responsive individuals whose chronic respiratory symptoms may be caused by airway inflammation.

## 2019-09-09 NOTE — ASSESSMENT
[FreeTextEntry1] : Ms. Phipps is a 74 year old female with a history of recently diverticulosis, GERD, collagenous colitis, asthma, allergies, TBM, OSAS who now comes  ?PNA complicated by tracheomalacia. She is s/p tracheoplasty with bronchospasm. - s/p asthmatic type bronchitis and GERD. She is currently stable from a pulmonary perspective; her number one issue is poor sleep / residual cough.\par \par Her cough and shortness of breath is related to:\par -acquired tracheobronchomalacia \par -asthma  - Active\par -abnormal hypersensitivity panel \par -post nasal drip syndrome\par -GERD (Active)\par \par problem 1: severe persistent asthma\par -continue to use Xopenex 1.25 via nebulizer Q8H (in the morning)\par -continue to use Pulmicort 0.5 BID (after Xopenex)\par -followed by Spiriva 1 inhalation QD\par -continue to use Singulair 10 mg QHS\par \par -Asthma is believed to be caused by inherited (genetic) and environmental factor, but its exact cause is unknown. Asthma may be triggered by allergens, lung infections, or irritants in the air. Asthma triggers are different for each person\par -Inhaler technique reviewed as well as oral hygiene techniques reviewed with patient. Avoidance of cold air, extremes of temperature, rescue inhaler should be used before exercise. Order of medication reviewed with patient. Recommended use of a cool mist humidifier in the bedroom. \par \par problem 3: sensory neuropathic cough\par -off Elavil 25 mg up to TID- QHS- on the shelf \par -continue Baclofen 10mg pre-meal, PRN\par -Add Hycodan syrup - (ISTOP)\par \par -Sensory neuropathic cough is an etiology of cough that is often realized once someone has been ruled out for common disease such as: asthma, COPD, eosinophilic bronchitis, bronchiectasis, post nasal drip, and GERD. It sometimes develops following a URI, herpes zoster outbreak in pharynx or thyroid or cervical spine injury. However, many patients have no identifiable antecedent explanation. \par \par problem 4: tracheobronchomalacia\par -s/p surgery with Dr. Boyd Ramirez in 11/2017\par -s/p bronchoscopy with Dr. Ramirez \par \par Tracheomalacia is usually acquired in adults and common causes include damage by tracheostomy or endotracheal intubation damaging the tracheal cartilage with increase risk with multiple intubations, prolonged intubation, and concurrent high dose steroid therapy; external chest wall trauma and surgery; chronic compression of the trachea by benign etiologies (eg, benign mediastinal goiter) or malignancy; relapsing polychondritis; or recurrent infection. Tracheomalacia can be asymptomatic, however signs or symptoms can develop as the severity of the airway narrowing progresses with major symptoms include dyspnea, cough, and sputum retention. Other symptoms include severe paroxysms of coughing, wheezing or stridor, barking cough and may be exacerbated by forced expiration, cough, and Valsalva maneuver. Tracheomalacia is diagnosed by a bronchoscopic visualization of dynamic airway collapse on dynamic chest CT. Therapy is warranted in symptomatic patients with severe tracheomalacia and includes surgical repair as tracheobronchoplasty. The patient was referred to Dr. Matt Ramirez or Dr. Tai Kohli, at [facility] for a surgical consult.\par \par problem 5: abnormal hypersensitivity panel/allergic panel\par -s/p allergist evaluation - no treatment recommended at this time\par \par -Environmental measures for allergies were encouraged including mattress and pillow cover, air purifier, and environmental controls. \par \par Problem 6: s/p mycoplasma pneumonia \par -s/p prolonged Zithromax for anti inflammatory\par \par problem 7: overweight\par -Weight loss, exercise, and diet control were discussed and are highly encouraged. Treatment options were given such as, aqua therapy, and contacting a nutritionist. Recommended to use the elliptical, stationary bike, less use of treadmill. Obesity is associated with worsening asthma, shortness of breath, and potential for cardiac disease, diabetes, and other underlying medical conditions.\par \par problem 8: GERD/LPR\par -continue Baclofen 10mg pre-meal - noncompliant\par -continue to use Protonix 40 mg before breakfast\par -continue to use Zantac 300 mg QHS\par -recommended [employer] Probiotic supplement \par \par -Rule of 2s: avoid eating too much, eating too late, eating too spicy, eating two hours before bed\par -Things to avoid including overeating, spicy foods, tight clothing, eating within three hours of bed, this list is not all inclusive. \par -For treatment of reflux, possible options discussed including diet control, H2 blockers, PPIs, as well as coating motility agents discussed as treatment options. Timing of meals and proximity of last meal to sleep were discussed. If symptoms persist, a formal gastrointestinal evaluation is needed. \par \par problem 9: dysphonia\par -recommended to use Fisherman's Friend Lozengers\par -felt to be related to cough causing vocal cord trauma, LPR, medication effect\par -recommended hydration and time \par \par problem 10: allergic rhinitis\par -recommended xlear saline \par -recommended OTC antihistamine PRN (Claritin 10 mg QAM)\par \par -Environmental measures for allergies were encouraged including mattress and pillow cover, air purifier, and environmental controls.\par \par problem 11: SUSAN\par -recommended to use "Chin Strap" \par -recommended to take nocturnal medications earlier; OxyAid\par -recommended to have a mouth piece made - denied; failed CPAP\par -based on her Nunez's esophagus, EDS, ? snoring, elevated mallampati class, and poor sleep; she is being set up for an at home sleep study \par \par Sleep apnea is associated with adverse clinical consequences which an affect most organ systems. Cardiovascular disease risk includes arrhythmias, atrial fibrillation, hypertension, coronary artery disease, and stroke. Metabolic disorders include diabetes type 2, non-alcoholic fatty liver disease. Mood disorder especially depression; and cognitive decline especially in the elderly. Associations with chronic reflux/Nunez’s esophagus some but not all inclusive. \par -Reasons include arousal consistent with hypopnea; respiratory events most prominent in REM sleep or supine position; therefore sleep staging and body position are important for accurate diagnosis and estimation of AHI.\par -Good sleep hygiene was encouraged including avoiding watching television an hour before bed, keeping caffeine at a low, avoiding reading, television, or anything, in bed, no drinking any liquids three hours before bedtime, and only getting into bed when tired and ready for sleep. \par \par problem 12: health maintenance \par -recommended a yearly flu shot after October 15 2018\par -recommended strep pneumonia vaccines: Prevnar-13 vaccine, followed by Pneumo vaccine 23 on year following\par -recommended early intervention for URIs\par -recommended osteoporosis evaluations\par -recommended early dermatological evaluations\par -recommended after the age of 50 to the age of 70, colonoscopy every 5 years\par \par F/U in 3-4 months\par She is encouraged to call with any changes, concerns, or questions.

## 2019-09-09 NOTE — HISTORY OF PRESENT ILLNESS
[FreeTextEntry1] : Ms. Phipps is a 74 year old female with a history of allergic rhinitis, Nunez's esophagus, bronchoplasty/tracheoplasty, persistent cough, GERD, LPRD, SUSAN, TBM, and SOB presenting to the office today for a follow up visit. Her chief complaint is poor sleep.\par -she recently had a cold, and has a residual cough that is occasionally productive with white or yellow sputum\par -she notes she takes cough medication with codeine when her cough gets worse\par -she reports feeling nauseated at night, and wakes up with nausea at night\par -she notes her sleep quality is poor, and notes she calls her mother in [facility] at 4-5 AM\par -she reports she eats well\par -she reports having lost a few pounds\par -she notes her sinuses are leaky and she has post nasal drip, which may be contributing to her cough.\par -she notes she isnt taking Baclofen currently\par -she denies any snoring, chest pain, chest pressure, diarrhea, constipation, dysphagia, dizziness, sour taste in the mouth, heartburn, reflux, myalgias or arthralgias.

## 2019-09-09 NOTE — PHYSICAL EXAM
[General Appearance - Well Developed] : well developed [Normal Appearance] : normal appearance [Well Groomed] : well groomed [General Appearance - Well Nourished] : well nourished [No Deformities] : no deformities [General Appearance - In No Acute Distress] : no acute distress [Normal Conjunctiva] : the conjunctiva exhibited no abnormalities [Eyelids - No Xanthelasma] : the eyelids demonstrated no xanthelasmas [Normal Oropharynx] : normal oropharynx [II] : II [Neck Appearance] : the appearance of the neck was normal [Neck Cervical Mass (___cm)] : no neck mass was observed [Jugular Venous Distention Increased] : there was no jugular-venous distention [Thyroid Diffuse Enlargement] : the thyroid was not enlarged [Thyroid Nodule] : there were no palpable thyroid nodules [Heart Sounds] : normal S1 and S2 [Heart Rate And Rhythm] : heart rate and rhythm were normal [Murmurs] : no murmurs present [Respiration, Rhythm And Depth] : normal respiratory rhythm and effort [Exaggerated Use Of Accessory Muscles For Inspiration] : no accessory muscle use [Auscultation Breath Sounds / Voice Sounds] : lungs were clear to auscultation bilaterally [Abdomen Soft] : soft [Abdomen Tenderness] : non-tender [Abdomen Mass (___ Cm)] : no abdominal mass palpated [Abnormal Walk] : normal gait [Gait - Sufficient For Exercise Testing] : the gait was sufficient for exercise testing [Cyanosis, Localized] : no localized cyanosis [Nail Clubbing] : no clubbing of the fingernails [Petechial Hemorrhages (___cm)] : no petechial hemorrhages [Skin Color & Pigmentation] : normal skin color and pigmentation [] : no rash [No Venous Stasis] : no venous stasis [Skin Lesions] : no skin lesions [No Xanthoma] : no  xanthoma was observed [No Skin Ulcers] : no skin ulcer [Deep Tendon Reflexes (DTR)] : deep tendon reflexes were 2+ and symmetric [Sensation] : the sensory exam was normal to light touch and pinprick [No Focal Deficits] : no focal deficits [Oriented To Time, Place, And Person] : oriented to person, place, and time [Impaired Insight] : insight and judgment were intact [Affect] : the affect was normal [FreeTextEntry1] : I:E ratio 1:3; clear

## 2019-09-09 NOTE — REVIEW OF SYSTEMS
[Recent Wt Loss (___ Lbs)] : recent [unfilled] ~Ulb weight loss [Cough] : cough [Sputum] : sputum  [Nausea] : nausea [As Noted in HPI] : as noted in HPI [Negative] : Sleep Disorder

## 2019-09-24 ENCOUNTER — RX RENEWAL (OUTPATIENT)
Age: 74
End: 2019-09-24

## 2019-11-25 ENCOUNTER — APPOINTMENT (OUTPATIENT)
Dept: PULMONOLOGY | Facility: CLINIC | Age: 74
End: 2019-11-25

## 2019-12-30 ENCOUNTER — RX RENEWAL (OUTPATIENT)
Age: 74
End: 2019-12-30

## 2020-01-10 ENCOUNTER — NON-APPOINTMENT (OUTPATIENT)
Age: 75
End: 2020-01-10

## 2020-01-10 ENCOUNTER — APPOINTMENT (OUTPATIENT)
Dept: PULMONOLOGY | Facility: CLINIC | Age: 75
End: 2020-01-10
Payer: MEDICARE

## 2020-01-10 VITALS
OXYGEN SATURATION: 98 % | DIASTOLIC BLOOD PRESSURE: 72 MMHG | WEIGHT: 139 LBS | RESPIRATION RATE: 17 BRPM | HEIGHT: 60 IN | BODY MASS INDEX: 27.29 KG/M2 | SYSTOLIC BLOOD PRESSURE: 140 MMHG | HEART RATE: 71 BPM

## 2020-01-10 PROCEDURE — 99214 OFFICE O/P EST MOD 30 MIN: CPT | Mod: 25

## 2020-01-10 PROCEDURE — 94010 BREATHING CAPACITY TEST: CPT

## 2020-01-10 PROCEDURE — 95012 NITRIC OXIDE EXP GAS DETER: CPT

## 2020-01-10 NOTE — ASSESSMENT
[FreeTextEntry1] : Ms. Phipps is a 74 year old female with a history of recently diverticulosis, GERD, collagenous colitis, asthma, allergies, TBM, OSAS who now comes  ?PNA complicated by tracheomalacia. She is s/p tracheoplasty with bronchospasm. - s/p asthmatic type bronchitis and GERD. She is currently stable from a pulmonary perspective; her number one issue is poor sleep / residual cough / GERD \par \par Her cough and shortness of breath is related to:\par -acquired tracheobronchomalacia \par -asthma  - Active\par -abnormal hypersensitivity panel \par -post nasal drip syndrome\par -GERD (Active)\par \par problem 1: severe persistent asthma\par -continue to use Xopenex 1.25 via nebulizer Q8H (in the morning)\par -continue to use Pulmicort 0.5 BID (after Xopenex)\par -followed by Spiriva 1 inhalation QD\par -continue to use Singulair 10 mg QHS\par \par -Asthma is believed to be caused by inherited (genetic) and environmental factor, but its exact cause is unknown. Asthma may be triggered by allergens, lung infections, or irritants in the air. Asthma triggers are different for each person\par -Inhaler technique reviewed as well as oral hygiene techniques reviewed with patient. Avoidance of cold air, extremes of temperature, rescue inhaler should be used before exercise. Order of medication reviewed with patient. Recommended use of a cool mist humidifier in the bedroom. \par \par problem 3: sensory neuropathic cough\par -off Elavil 25 mg up to TID- QHS- on the shelf \par -continue Baclofen 10mg pre-meal, PRN\par -Add Hycodan syrup - (ISTOP)\par \par -Sensory neuropathic cough is an etiology of cough that is often realized once someone has been ruled out for common disease such as: asthma, COPD, eosinophilic bronchitis, bronchiectasis, post nasal drip, and GERD. It sometimes develops following a URI, herpes zoster outbreak in pharynx or thyroid or cervical spine injury. However, many patients have no identifiable antecedent explanation. \par \par problem 4: tracheobronchomalacia\par -s/p surgery with Dr. Boyd Ramirez in 11/2017\par -s/p bronchoscopy with Dr. Ramirez \par \par Tracheomalacia is usually acquired in adults and common causes include damage by tracheostomy or endotracheal intubation damaging the tracheal cartilage with increase risk with multiple intubations, prolonged intubation, and concurrent high dose steroid therapy; external chest wall trauma and surgery; chronic compression of the trachea by benign etiologies (eg, benign mediastinal goiter) or malignancy; relapsing polychondritis; or recurrent infection. Tracheomalacia can be asymptomatic, however signs or symptoms can develop as the severity of the airway narrowing progresses with major symptoms include dyspnea, cough, and sputum retention. Other symptoms include severe paroxysms of coughing, wheezing or stridor, barking cough and may be exacerbated by forced expiration, cough, and Valsalva maneuver. Tracheomalacia is diagnosed by a bronchoscopic visualization of dynamic airway collapse on dynamic chest CT. Therapy is warranted in symptomatic patients with severe tracheomalacia and includes surgical repair as tracheobronchoplasty. The patient was referred to Dr. Matt Ramirez or Dr. Tai Kohli, at [facility] for a surgical consult.\par \par problem 5: abnormal hypersensitivity panel/allergic panel\par -s/p allergist evaluation - no treatment recommended at this time\par \par -Environmental measures for allergies were encouraged including mattress and pillow cover, air purifier, and environmental controls. \par \par Problem 6: s/p mycoplasma pneumonia \par -s/p prolonged Zithromax for anti inflammatory\par \par problem 7: overweight\par -Weight loss, exercise, and diet control were discussed and are highly encouraged. Treatment options were given such as, aqua therapy, and contacting a nutritionist. Recommended to use the elliptical, stationary bike, less use of treadmill. Obesity is associated with worsening asthma, shortness of breath, and potential for cardiac disease, diabetes, and other underlying medical conditions.\par \par problem 8: GERD/LPR\par -continue Baclofen 10mg pre-meal - noncompliant\par -continue to use Protonix 40 mg before breakfast\par -Add Pepcid 40 mg QHS\par -recommended [employer] Probiotic supplement \par \par -Rule of 2s: avoid eating too much, eating too late, eating too spicy, eating two hours before bed\par -Things to avoid including overeating, spicy foods, tight clothing, eating within three hours of bed, this list is not all inclusive. \par -For treatment of reflux, possible options discussed including diet control, H2 blockers, PPIs, as well as coating motility agents discussed as treatment options. Timing of meals and proximity of last meal to sleep were discussed. If symptoms persist, a formal gastrointestinal evaluation is needed. \par \par problem 9: dysphonia\par -recommended to use Fisherman's Friend Lozengers\par -felt to be related to cough causing vocal cord trauma, LPR, medication effect\par -recommended hydration and time \par \par problem 10: allergic rhinitis\par -recommended xlear saline \par -recommended OTC antihistamine PRN (Claritin 10 mg QAM)\par \par -Environmental measures for allergies were encouraged including mattress and pillow cover, air purifier, and environmental controls.\par \par problem 11: SUSAN\par -recommended to use "Chin Strap" \par -recommended to take nocturnal medications earlier; OxyAid\par -recommended to have a mouth piece made - denied; failed CPAP\par -based on her Nunez's esophagus, EDS, ? snoring, elevated mallampati class, and poor sleep; she is being set up for an at home sleep study \par \par Sleep apnea is associated with adverse clinical consequences which an affect most organ systems. Cardiovascular disease risk includes arrhythmias, atrial fibrillation, hypertension, coronary artery disease, and stroke. Metabolic disorders include diabetes type 2, non-alcoholic fatty liver disease. Mood disorder especially depression; and cognitive decline especially in the elderly. Associations with chronic reflux/Nunez’s esophagus some but not all inclusive. \par -Reasons include arousal consistent with hypopnea; respiratory events most prominent in REM sleep or supine position; therefore sleep staging and body position are important for accurate diagnosis and estimation of AHI.\par -Good sleep hygiene was encouraged including avoiding watching television an hour before bed, keeping caffeine at a low, avoiding reading, television, or anything, in bed, no drinking any liquids three hours before bedtime, and only getting into bed when tired and ready for sleep. \par \par problem 12: health maintenance \par -recommended a yearly flu shot after October 15 2018 (refused 2019)\par -recommended strep pneumonia vaccines: Prevnar-13 vaccine, followed by Pneumo vaccine 23 on year following\par -recommended early intervention for URIs\par -recommended osteoporosis evaluations\par -recommended early dermatological evaluations\par -recommended after the age of 50 to the age of 70, colonoscopy every 5 years\par \par F/U in 3-4 months\par She is encouraged to call with any changes, concerns, or questions.

## 2020-01-10 NOTE — PROCEDURE
[FreeTextEntry1] : PFT revealed normal flows, with a FEV1 of 2.29 L, which is 129% of predicted, with a normal flow volume loop\par \par FENO was 33 ; a normal value being less than 25\par Fractional exhaled nitric oxide (FENO) is regarded as a simple, noninvasive method for assessing eosinophilic airway inflammation. Produced by a variety of cells within the lung, nitric oxide (NO) concentrations are generally low in healthy individuals. However, high concentrations of NO appear to be involved in nonspecific host defense mechanisms and chronic inflammatory diseases such as asthma. The American Thoracic Society (ATS) therefore has recommended using FENO to aid in the diagnosis and monitoring of eosinophilic airway inflammation and asthma, and for identifying steroid responsive individuals whose chronic respiratory symptoms may be caused by airway inflammation.\par

## 2020-01-10 NOTE — HISTORY OF PRESENT ILLNESS
[FreeTextEntry1] : Ms. Phipps is a 74 year old female with a history of allergic rhinitis, Nunez's esophagus, bronchoplasty/tracheoplasty, persistent cough, GERD, LPRD, SUSAN, TBM, and SOB presenting to the office today for a follow up visit. Her chief complaint is cough and poor sleep\par \par - shes coughing more recently, due to her being anxious \par - not experiencing CP\par - cold air is not bothering her\par - experiences heart palpitations due to being nervous \par - no visual issues\par - not sleeping well, due to her having to wake up and speak to her mother at 4AM \par - she has been trying to walk and exercise more at home\par - experiences PND\par - experiencing reflux, using zantac\par \par \par -she denies any headaches, nausea, vomiting, fever, chills, sweats, chest pain, chest pressure, diarrhea, constipation, dysphagia, dizziness, sour taste in the mouth, leg swelling, leg pain, itchy eyes, itchy ears, heartburn, reflux, myalgias or arthralgias.\par

## 2020-03-03 ENCOUNTER — APPOINTMENT (OUTPATIENT)
Dept: GASTROENTEROLOGY | Facility: CLINIC | Age: 75
End: 2020-03-03
Payer: MEDICARE

## 2020-03-03 VITALS
WEIGHT: 145 LBS | SYSTOLIC BLOOD PRESSURE: 154 MMHG | HEIGHT: 63 IN | HEART RATE: 79 BPM | OXYGEN SATURATION: 98 % | TEMPERATURE: 97.7 F | DIASTOLIC BLOOD PRESSURE: 70 MMHG | RESPIRATION RATE: 15 BRPM | BODY MASS INDEX: 25.69 KG/M2

## 2020-03-03 PROCEDURE — 99213 OFFICE O/P EST LOW 20 MIN: CPT

## 2020-03-03 NOTE — PHYSICAL EXAM
[General Appearance - Alert] : alert [General Appearance - In No Acute Distress] : in no acute distress [PERRL With Normal Accommodation] : pupils were equal in size, round, and reactive to light [Sclera] : the sclera and conjunctiva were normal [Extraocular Movements] : extraocular movements were intact [Oriented To Time, Place, And Person] : oriented to person, place, and time [Impaired Insight] : insight and judgment were intact [Affect] : the affect was normal

## 2020-03-03 NOTE — HISTORY OF PRESENT ILLNESS
[de-identified] : 74-year-old female, worsening acid reflux complaints\par Patient denies any recent use of her pantoprazole\par Until recently had been using Zantac successfully\par No relief with Pepcid\par Daily complaints\par Using Gas-X\par Does not like tums\par \par Social history nonsmoker, dentist

## 2020-03-03 NOTE — ASSESSMENT
[FreeTextEntry1] : Worsening acid reflux complaints since discontinuing Zantac,\par Zantac pulled off the store shelves as noted\par No significant improvement with Pepcid\par Patient with prior use of proton pump inhibitor, though she cannot recall how beneficial\par \par Plan\par Pantoprazole 40 mg daily to be taken before breakfast\par Medication reordered\par Patient understands to contact me in a week and a half with no significant improvement

## 2020-04-09 NOTE — ASU PATIENT PROFILE, ADULT - MEDICATION HERBAL REMEDIES, PROFILE
- continue with synthroid no - continue with hydrocortisone 10 mg BID  - continue 1g sodium chloride daily

## 2020-05-11 ENCOUNTER — APPOINTMENT (OUTPATIENT)
Dept: PULMONOLOGY | Facility: CLINIC | Age: 75
End: 2020-05-11
Payer: MEDICARE

## 2020-05-11 VITALS
BODY MASS INDEX: 28.47 KG/M2 | SYSTOLIC BLOOD PRESSURE: 140 MMHG | OXYGEN SATURATION: 98 % | WEIGHT: 145 LBS | HEART RATE: 92 BPM | TEMPERATURE: 97.7 F | DIASTOLIC BLOOD PRESSURE: 82 MMHG | HEIGHT: 60 IN | RESPIRATION RATE: 17 BRPM

## 2020-05-11 PROCEDURE — 99214 OFFICE O/P EST MOD 30 MIN: CPT

## 2020-05-11 NOTE — HISTORY OF PRESENT ILLNESS
[FreeTextEntry1] : Ms. Phipps is a 74 year old female with a history of allergic rhinitis, Nunez's esophagus, bronchoplasty/tracheoplasty, persistent cough, GERD, LPRD, SUSAN, TBM, and SOB presenting to the office today for a follow up visit. Her chief complaint is s/p acute URI. Pt states:\par -a couple of weeks ago she had yellow phlegm and a cough and took abx - Amox 500 mg x 8d\par -weight is stable \par -ankle pain and knee pain\par -her right trigger finer is stuck \par -she is taking vitamins and supplements, no medications\par -she is not sleeping well\par -today she denies any headaches, nausea, vomiting, fever, chills, sweats, chest pain, chest pressure, diarrhea, constipation, dysphagia, dizziness, leg swelling, leg pain, itchy eyes, itchy ears, heartburn, reflux, or sour taste in the mouth.

## 2020-05-11 NOTE — PHYSICAL EXAM
[General Appearance - Well Developed] : well developed [Normal Appearance] : normal appearance [Well Groomed] : well groomed [General Appearance - Well Nourished] : well nourished [General Appearance - In No Acute Distress] : no acute distress [Normal Conjunctiva] : the conjunctiva exhibited no abnormalities [No Deformities] : no deformities [Normal Oropharynx] : normal oropharynx [Eyelids - No Xanthelasma] : the eyelids demonstrated no xanthelasmas [Neck Appearance] : the appearance of the neck was normal [Jugular Venous Distention Increased] : there was no jugular-venous distention [Thyroid Diffuse Enlargement] : the thyroid was not enlarged [Neck Cervical Mass (___cm)] : no neck mass was observed [Thyroid Nodule] : there were no palpable thyroid nodules [Heart Rate And Rhythm] : heart rate and rhythm were normal [Heart Sounds] : normal S1 and S2 [Murmurs] : no murmurs present [Respiration, Rhythm And Depth] : normal respiratory rhythm and effort [Auscultation Breath Sounds / Voice Sounds] : lungs were clear to auscultation bilaterally [Exaggerated Use Of Accessory Muscles For Inspiration] : no accessory muscle use [Abdomen Soft] : soft [Abdomen Tenderness] : non-tender [Abnormal Walk] : normal gait [Abdomen Mass (___ Cm)] : no abdominal mass palpated [Nail Clubbing] : no clubbing of the fingernails [Cyanosis, Localized] : no localized cyanosis [Gait - Sufficient For Exercise Testing] : the gait was sufficient for exercise testing [Petechial Hemorrhages (___cm)] : no petechial hemorrhages [] : no rash [Skin Color & Pigmentation] : normal skin color and pigmentation [No Venous Stasis] : no venous stasis [Skin Lesions] : no skin lesions [No Skin Ulcers] : no skin ulcer [No Xanthoma] : no  xanthoma was observed [Deep Tendon Reflexes (DTR)] : deep tendon reflexes were 2+ and symmetric [Sensation] : the sensory exam was normal to light touch and pinprick [No Focal Deficits] : no focal deficits [Oriented To Time, Place, And Person] : oriented to person, place, and time [Impaired Insight] : insight and judgment were intact [Affect] : the affect was normal [III] : III [FreeTextEntry1] : I:E ratio 1:3; clear

## 2020-05-11 NOTE — ADDENDUM
[FreeTextEntry1] : Documented by Oscar Ledesma acting as a scribe for Dr. Denzel Saunders on 05/11/2020.\par \par All medical record entries made by the Scribe were at my, Dr. Denzel Saunders's, direction and personally dictated by me on 05/11/2020. I have reviewed the chart and agree that the record accurately reflects my personal performance of the history, physical exam, assessment and plan. I have also personally directed, reviewed, and agree with the discharge instructions.

## 2020-05-11 NOTE — ASSESSMENT
[FreeTextEntry1] : Ms. Phipps is a 74 year old female with a history of recently diverticulosis, GERD, collagenous colitis, asthma, allergies, TBM, OSAS who now comes  ?PNA complicated by tracheomalacia. She is s/p tracheoplasty with bronchospasm. - s/p asthmatic type bronchitis and GERD. She is currently stable from a pulmonary perspective; her number one issue is poor sleep \par \par Her cough and shortness of breath is related to:\par -acquired tracheobronchomalacia \par -asthma  - Active\par -abnormal hypersensitivity panel \par -post nasal drip syndrome\par -GERD (Active)\par \par problem 1: severe persistent asthma\par -continue to use Xopenex 1.25 via nebulizer Q8H (in the morning)\par -continue to use Pulmicort 0.5 BID (after Xopenex)\par -followed by Spiriva 1 inhalation QD\par -continue to use Singulair 10 mg QHS\par \par -Asthma is believed to be caused by inherited (genetic) and environmental factor, but its exact cause is unknown. Asthma may be triggered by allergens, lung infections, or irritants in the air. Asthma triggers are different for each person\par -Inhaler technique reviewed as well as oral hygiene techniques reviewed with patient. Avoidance of cold air, extremes of temperature, rescue inhaler should be used before exercise. Order of medication reviewed with patient. Recommended use of a cool mist humidifier in the bedroom. \par \par problem 3: sensory neuropathic cough\par -off Elavil 25 mg up to TID- QHS- on the shelf \par -continue Baclofen 10mg pre-meal, PRN\par -Add Hycodan syrup - (ISTOP)\par \par -Sensory neuropathic cough is an etiology of cough that is often realized once someone has been ruled out for common disease such as: asthma, COPD, eosinophilic bronchitis, bronchiectasis, post nasal drip, and GERD. It sometimes develops following a URI, herpes zoster outbreak in pharynx or thyroid or cervical spine injury. However, many patients have no identifiable antecedent explanation. \par \par problem 4: tracheobronchomalacia\par -s/p surgery with Dr. Boyd Ramirez in 11/2017\par -s/p bronchoscopy with Dr. Ramirez \par \par Tracheomalacia is usually acquired in adults and common causes include damage by tracheostomy or endotracheal intubation damaging the tracheal cartilage with increase risk with multiple intubations, prolonged intubation, and concurrent high dose steroid therapy; external chest wall trauma and surgery; chronic compression of the trachea by benign etiologies (eg, benign mediastinal goiter) or malignancy; relapsing polychondritis; or recurrent infection. Tracheomalacia can be asymptomatic, however signs or symptoms can develop as the severity of the airway narrowing progresses with major symptoms include dyspnea, cough, and sputum retention. Other symptoms include severe paroxysms of coughing, wheezing or stridor, barking cough and may be exacerbated by forced expiration, cough, and Valsalva maneuver. Tracheomalacia is diagnosed by a bronchoscopic visualization of dynamic airway collapse on dynamic chest CT. Therapy is warranted in symptomatic patients with severe tracheomalacia and includes surgical repair as tracheobronchoplasty. The patient was referred to Dr. Matt Ramirez or Dr. Tai Kohli, at [facility] for a surgical consult.\par \par problem 5: abnormal hypersensitivity panel/allergic panel\par -s/p allergist evaluation - no treatment recommended at this time\par \par -Environmental measures for allergies were encouraged including mattress and pillow cover, air purifier, and environmental controls. \par \par Problem 6: s/p mycoplasma pneumonia \par -s/p prolonged Zithromax for anti inflammatory\par \par problem 7: overweight\par -Weight loss, exercise, and diet control were discussed and are highly encouraged. Treatment options were given such as, aqua therapy, and contacting a nutritionist. Recommended to use the elliptical, stationary bike, less use of treadmill. Obesity is associated with worsening asthma, shortness of breath, and potential for cardiac disease, diabetes, and other underlying medical conditions.\par \par problem 8: GERD/LPR\par -continue Baclofen 10mg pre-meal - noncompliant\par -continue to use Protonix 40 mg before breakfast\par -Add Pepcid 40 mg QHS\par -recommended [employer] Probiotic supplement \par \par -Rule of 2s: avoid eating too much, eating too late, eating too spicy, eating two hours before bed\par -Things to avoid including overeating, spicy foods, tight clothing, eating within three hours of bed, this list is not all inclusive. \par -For treatment of reflux, possible options discussed including diet control, H2 blockers, PPIs, as well as coating motility agents discussed as treatment options. Timing of meals and proximity of last meal to sleep were discussed. If symptoms persist, a formal gastrointestinal evaluation is needed. \par \par problem 9: dysphonia\par -recommended to use [employer]\par -felt to be related to cough causing vocal cord trauma, LPR, medication effect\par -recommended hydration and time \par \par problem 10: allergic rhinitis\par -recommended xlear saline \par -recommended OTC antihistamine PRN (Claritin 10 mg QAM)\par \par -Environmental measures for allergies were encouraged including mattress and pillow cover, air purifier, and environmental controls.\par \par problem 11: SUSAN\par -recommended to use "Chin Strap" \par -recommended to take nocturnal medications earlier; OxyAid\par -recommended to have a mouth piece made - denied; failed CPAP\par -based on her Nunez's esophagus, EDS, ? snoring, elevated mallampati class, and poor sleep; she is being set up for an at home sleep study \par \par Sleep apnea is associated with adverse clinical consequences which an affect most organ systems. Cardiovascular disease risk includes arrhythmias, atrial fibrillation, hypertension, coronary artery disease, and stroke. Metabolic disorders include diabetes type 2, non-alcoholic fatty liver disease. Mood disorder especially depression; and cognitive decline especially in the elderly. Associations with chronic reflux/Nunez’s esophagus some but not all inclusive. \par -Reasons include arousal consistent with hypopnea; respiratory events most prominent in REM sleep or supine position; therefore sleep staging and body position are important for accurate diagnosis and estimation of AHI.\par -Good sleep hygiene was encouraged including avoiding watching television an hour before bed, keeping caffeine at a low, avoiding reading, television, or anything, in bed, no drinking any liquids three hours before bedtime, and only getting into bed when tired and ready for sleep. \par \par Problem 12: Health Maintenance/COVID19 Precautions \par - Clean your hands often. Wash your hands often with soap and water for at least 20 seconds, especially after blowing your nose, coughing, or sneezing, or having been in a public place.\par - If soap and water are not available, use a hand  that contains at least 60% alcohol.\par - To the extent possible, avoid touching high-touch surfaces in public places - elevator buttons, door handles, handrails, handshaking with people, etc. Use a tissue or your sleeve to cover your hand or finger if you must touch something.\par - Wash your hands after touching surfaces in public places.\par - Avoid touching your face, nose, eyes, etc.\par - Clean and disinfect your home to remove germs: practice routine cleaning of frequently touched surfaces (for example: tables, doorknobs, light switches, handles, desks, toilets, faucets, sinks & cell phones)\par - Avoid crowds, especially in poorly ventilated spaces. Your risk of exposure to respiratory viruses like COVID-19 may increase in crowded, closed-in settings with little air circulation if there are people in the crowd who are sick. All patients are recommended to practice social distancing and stay at least 6 feet away from others. \par - Avoid all non-essential travel including plane trips, and especially avoid embarking on cruise ships.\par -If COVID-19 is spreading in your community, take extra measures to put distance between yourself and other people to further reduce your risk of being exposed to this new virus.\par -Stay home as much as possible.\par - Consider ways of getting food brought to your house through family, social, or commercial networks\par -Be aware that the virus has been known to live in the air up to 3 hours post exposure, cardboard up to 24 hours post exposure, copper up to 4 hours post exposure, steel and plastic up to 2-3 days post exposure. Risk of transmission from these surfaces are affected by many variables.\par COVID-19 precautionary Immune Support Recommendations:\par -OTC Vitamin C 500mg BID \par -OTC Quercetin 250-500mg BID \par -OTC Zinc 75-100mg per day \par -OTC Melatonin 1 or 2mg a night \par -OTC Vitamin D 1-4000mg per day \par -OTC Tonic Water 8oz per day\par -Water 0.5-1 gallon per day\par Asthma and COVID19:\par You need to make sure your asthma is under control. This often requires the use of inhaled corticosteroids (and sometimes oral corticosteroids). Inhaled corticosteroids do not likely reduce your immune system’s ability to fight infections, but oral corticosteroids may. It is important to use the steps above to protect yourself to limit your exposure to any respiratory virus. \par \par problem 13: health maintenance \par -recommended a yearly flu shot after October 15 2018 (refused 2019)\par -recommended strep pneumonia vaccines: Prevnar-13 vaccine, followed by Pneumo vaccine 23 on year following\par -recommended early intervention for URIs\par -recommended osteoporosis evaluations\par -recommended early dermatological evaluations\par -recommended after the age of 50 to the age of 70, colonoscopy every 5 years\par \par F/U in 3-4 months\par She is encouraged to call with any changes, concerns, or questions.

## 2020-06-15 ENCOUNTER — RX RENEWAL (OUTPATIENT)
Age: 75
End: 2020-06-15

## 2020-07-23 ENCOUNTER — APPOINTMENT (OUTPATIENT)
Dept: THORACIC SURGERY | Facility: CLINIC | Age: 75
End: 2020-07-23

## 2020-09-20 ENCOUNTER — RX RENEWAL (OUTPATIENT)
Age: 75
End: 2020-09-20

## 2020-10-28 ENCOUNTER — APPOINTMENT (OUTPATIENT)
Dept: PULMONOLOGY | Facility: CLINIC | Age: 75
End: 2020-10-28
Payer: MEDICARE

## 2020-10-28 VITALS
SYSTOLIC BLOOD PRESSURE: 134 MMHG | DIASTOLIC BLOOD PRESSURE: 90 MMHG | TEMPERATURE: 97.3 F | HEIGHT: 60 IN | BODY MASS INDEX: 28.66 KG/M2 | WEIGHT: 146 LBS | OXYGEN SATURATION: 98 %

## 2020-10-28 PROCEDURE — 99214 OFFICE O/P EST MOD 30 MIN: CPT

## 2020-10-28 NOTE — ASSESSMENT
[FreeTextEntry1] : Ms. Phipps is a 75 year old female with a history of recently diverticulosis, GERD, collagenous colitis, asthma, allergies, TBM, OSAS who now comes  ?PNA complicated by tracheomalacia. She is s/p tracheoplasty with bronchospasm. - s/p asthmatic type bronchitis and GERD. She is currently stable from a pulmonary perspective; her number one issue is poor sleep (due to mother issues)\par \par Her cough and shortness of breath is related to:\par -acquired tracheobronchomalacia \par -asthma  - Active\par -abnormal hypersensitivity panel \par -post nasal drip syndrome\par -GERD (Active)\par \par problem 1: severe persistent asthma\par -continue to use Xopenex 1.25 via nebulizer Q8H (in the morning)\par -continue to use Pulmicort 0.5 BID (after Xopenex)\par -followed by Spiriva 1 inhalation QD\par -continue to use Singulair 10 mg QHS\par \par -Asthma is believed to be caused by inherited (genetic) and environmental factor, but its exact cause is unknown. Asthma may be triggered by allergens, lung infections, or irritants in the air. Asthma triggers are different for each person\par -Inhaler technique reviewed as well as oral hygiene techniques reviewed with patient. Avoidance of cold air, extremes of temperature, rescue inhaler should be used before exercise. Order of medication reviewed with patient. Recommended use of a cool mist humidifier in the bedroom. \par \par problem 3: sensory neuropathic cough\par -off Elavil 25 mg up to TID- QHS- on the shelf \par -continue Baclofen 10mg pre-meal, PRN\par -Add Hycodan syrup - (ISTOP)\par \par -Sensory neuropathic cough is an etiology of cough that is often realized once someone has been ruled out for common disease such as: asthma, COPD, eosinophilic bronchitis, bronchiectasis, post nasal drip, and GERD. It sometimes develops following a URI, herpes zoster outbreak in pharynx or thyroid or cervical spine injury. However, many patients have no identifiable antecedent explanation. \par \par problem 4: tracheobronchomalacia\par -s/p surgery with Dr. Boyd Ramirez in 11/2017\par -s/p bronchoscopy with Dr. Ramirez \par \par Tracheomalacia is usually acquired in adults and common causes include damage by tracheostomy or endotracheal intubation damaging the tracheal cartilage with increase risk with multiple intubations, prolonged intubation, and concurrent high dose steroid therapy; external chest wall trauma and surgery; chronic compression of the trachea by benign etiologies (eg, benign mediastinal goiter) or malignancy; relapsing polychondritis; or recurrent infection. Tracheomalacia can be asymptomatic, however signs or symptoms can develop as the severity of the airway narrowing progresses with major symptoms include dyspnea, cough, and sputum retention. Other symptoms include severe paroxysms of coughing, wheezing or stridor, barking cough and may be exacerbated by forced expiration, cough, and Valsalva maneuver. Tracheomalacia is diagnosed by a bronchoscopic visualization of dynamic airway collapse on dynamic chest CT. Therapy is warranted in symptomatic patients with severe tracheomalacia and includes surgical repair as tracheobronchoplasty. The patient was referred to Dr. Matt Ramirez or Dr. Tai Kohli, at [facility] for a surgical consult.\par \par problem 5: abnormal hypersensitivity panel/allergic panel\par -s/p allergist evaluation - no treatment recommended at this time\par \par -Environmental measures for allergies were encouraged including mattress and pillow cover, air purifier, and environmental controls. \par \par Problem 6: s/p mycoplasma pneumonia \par -s/p prolonged Zithromax for anti inflammatory\par \par problem 7: overweight\par -Weight loss, exercise, and diet control were discussed and are highly encouraged. Treatment options were given such as, aqua therapy, and contacting a nutritionist. Recommended to use the elliptical, stationary bike, less use of treadmill. Obesity is associated with worsening asthma, shortness of breath, and potential for cardiac disease, diabetes, and other underlying medical conditions.\par \par problem 8: GERD/LPR\par -continue Baclofen 10mg pre-meal - noncompliant\par -continue to use Protonix 40 mg before breakfast\par -continue Pepcid 40 mg QHS\par -recommended [employer] Probiotic supplement \par \par -Rule of 2s: avoid eating too much, eating too late, eating too spicy, eating two hours before bed\par -Things to avoid including overeating, spicy foods, tight clothing, eating within three hours of bed, this list is not all inclusive. \par -For treatment of reflux, possible options discussed including diet control, H2 blockers, PPIs, as well as coating motility agents discussed as treatment options. Timing of meals and proximity of last meal to sleep were discussed. If symptoms persist, a formal gastrointestinal evaluation is needed. \par \par problem 9: dysphonia\par -recommended to use Fisherman's Friend Lozengers\par -felt to be related to cough causing vocal cord trauma, LPR, medication effect\par -recommended hydration and time \par \par problem 10: allergic rhinitis\par -recommended xlear saline \par -recommended OTC antihistamine PRN (Claritin 10 mg QAM)\par \par -Environmental measures for allergies were encouraged including mattress and pillow cover, air purifier, and environmental controls.\par \par problem 11: SUSAN\par -Add Rozerem 8mg qhs\par -recommended to use "Chin Strap" \par -recommended to take nocturnal medications earlier; OxyAid\par -recommended to have a mouth piece made - denied; failed CPAP\par -based on her Nunez's esophagus, EDS, ? snoring, elevated mallampati class, and poor sleep; she is being set up for an at home sleep study \par \par Sleep apnea is associated with adverse clinical consequences which an affect most organ systems. Cardiovascular disease risk includes arrhythmias, atrial fibrillation, hypertension, coronary artery disease, and stroke. Metabolic disorders include diabetes type 2, non-alcoholic fatty liver disease. Mood disorder especially depression; and cognitive decline especially in the elderly. Associations with chronic reflux/Nunez’s esophagus some but not all inclusive. \par -Reasons include arousal consistent with hypopnea; respiratory events most prominent in REM sleep or supine position; therefore sleep staging and body position are important for accurate diagnosis and estimation of AHI.\par -Good sleep hygiene was encouraged including avoiding watching television an hour before bed, keeping caffeine at a low, avoiding reading, television, or anything, in bed, no drinking any liquids three hours before bedtime, and only getting into bed when tired and ready for sleep. \par \par Problem 12: Health Maintenance/COVID19 Precautions \par - Clean your hands often. Wash your hands often with soap and water for at least 20 seconds, especially after blowing your nose, coughing, or sneezing, or having been in a public place.\par - If soap and water are not available, use a hand  that contains at least 60% alcohol.\par - To the extent possible, avoid touching high-touch surfaces in public places - elevator buttons, door handles, handrails, handshaking with people, etc. Use a tissue or your sleeve to cover your hand or finger if you must touch something.\par - Wash your hands after touching surfaces in public places.\par - Avoid touching your face, nose, eyes, etc.\par - Clean and disinfect your home to remove germs: practice routine cleaning of frequently touched surfaces (for example: tables, doorknobs, light switches, handles, desks, toilets, faucets, sinks & cell phones)\par - Avoid crowds, especially in poorly ventilated spaces. Your risk of exposure to respiratory viruses like COVID-19 may increase in crowded, closed-in settings with little air circulation if there are people in the crowd who are sick. All patients are recommended to practice social distancing and stay at least 6 feet away from others. \par - Avoid all non-essential travel including plane trips, and especially avoid embarking on cruise ships.\par -If COVID-19 is spreading in your community, take extra measures to put distance between yourself and other people to further reduce your risk of being exposed to this new virus.\par -Stay home as much as possible.\par - Consider ways of getting food brought to your house through family, social, or commercial networks\par -Be aware that the virus has been known to live in the air up to 3 hours post exposure, cardboard up to 24 hours post exposure, copper up to 4 hours post exposure, steel and plastic up to 2-3 days post exposure. Risk of transmission from these surfaces are affected by many variables.\par COVID-19 precautionary Immune Support Recommendations:\par -OTC Vitamin C 500mg BID \par -OTC Quercetin 250-500mg BID \par -OTC Zinc 75-100mg per day \par -OTC Melatonin 1 or 2mg a night \par -OTC Vitamin D 1-4000mg per day \par -OTC Tonic Water 8oz per day\par -Water 0.5-1 gallon per day\par Asthma and COVID19:\par You need to make sure your asthma is under control. This often requires the use of inhaled corticosteroids (and sometimes oral corticosteroids). Inhaled corticosteroids do not likely reduce your immune system’s ability to fight infections, but oral corticosteroids may. It is important to use the steps above to protect yourself to limit your exposure to any respiratory virus. \par \par problem 13: health maintenance \par -recommended a yearly flu shot after October 15 2018 (refused 2020)\par -recommended strep pneumonia vaccines: Prevnar-13 vaccine, followed by Pneumo vaccine 23 on year following (refused)\par -recommended early intervention for URIs\par -recommended osteoporosis evaluations\par -recommended early dermatological evaluations\par -recommended after the age of 50 to the age of 70, colonoscopy every 5 years\par \par F/U in 3-4 months\par She is encouraged to call with any changes, concerns, or questions.

## 2020-10-28 NOTE — HISTORY OF PRESENT ILLNESS
[FreeTextEntry1] : Ms. Phipps is a 75 year old female with a history of allergic rhinitis, Nunez's esophagus, bronchoplasty/tracheoplasty, persistent cough, GERD, LPRD, SUSAN, TBM, and SOB presenting to the office today for a follow up visit. Her chief complaint is sleep. \par -has been feeling well. \par -reports a heartburn\par -not much reflux \par -energy level is 8/10. \par -she started to exercise since the last few days. \par -sleep is not well. \par -she states that she has trouble with staying asleep due to mother. \par -she feels blessed to still have her mother. \par -she bought some things online for her sleep but they did not help. \par -She reports that She is not using any new medication, vitamins, or supplements. \par -She reports that She is taking their prescribed medications regularly. \par \par -She denies any chest pain, chest pressure, diarrhea, constipation, dysphagia, dizziness, sour taste in the mouth, leg swelling, leg pain, itchy eyes, itchy ears.

## 2020-10-28 NOTE — ADDENDUM
[FreeTextEntry1] : Documented by Brayden Luz acting as a scribe for Dr. Denzel Saunders on 10/28/2020 \par \par All medical record entries made by the Scribe were at my, Dr. Denzel Saundesr's, direction and personally dictated by me on 10/28/2020 . I have reviewed the chart and agree that the record accurately reflects my personal performance of the history, physical exam, assessment and plan. I have also personally directed, reviewed, and agree with the discharge instructions.

## 2021-03-09 ENCOUNTER — APPOINTMENT (OUTPATIENT)
Dept: PULMONOLOGY | Facility: CLINIC | Age: 76
End: 2021-03-09
Payer: MEDICARE

## 2021-03-09 VITALS
OXYGEN SATURATION: 98 % | RESPIRATION RATE: 17 BRPM | HEART RATE: 81 BPM | TEMPERATURE: 97 F | SYSTOLIC BLOOD PRESSURE: 150 MMHG | WEIGHT: 142 LBS | DIASTOLIC BLOOD PRESSURE: 80 MMHG | HEIGHT: 63 IN | BODY MASS INDEX: 25.16 KG/M2

## 2021-03-09 PROCEDURE — 99214 OFFICE O/P EST MOD 30 MIN: CPT

## 2021-03-09 NOTE — HISTORY OF PRESENT ILLNESS
[FreeTextEntry1] : Ms. Phipps is a 75 year old female with a history of allergic rhinitis, Nunez's esophagus, bronchoplasty/tracheoplasty, persistent cough, GERD, LPRD, SUSAN, TBM, and SOB presenting to the office today for a follow up visit. Her chief complaint is\par \par -she notes moderate intermittent cough exacerbated by cold air exposures, masks use during exertion onset 2 weeks ago\par -she denies SOB\par -she notes regular bowel movements \par -she denies ankle swelling\par -she notes chronic poor sleep quality\par -she notes sleep interrupted every two hours ago\par -she notes energy levels are stable\par -she notes intermittent increased mucus production with residual cough to clear\par -she notes intermittent globus sensation\par \par -denies any headaches, nausea, vomiting, fever, chills, sweats, chest pain, chest pressure, diarrhea, constipation, dysphagia, sour taste in the mouth, dizziness, leg swelling, leg pain, myalgias, arthralgias, itchy eyes, itchy ears, heartburn, or reflux.\par \par

## 2021-03-09 NOTE — ADDENDUM
[FreeTextEntry1] : Documented by Dwayne Zabala acting as a scribe for Dr. Denzel Saunders on 03/09/2021.\par \par All medical record entries made by the Scribe were at my, Dr. Denzel Saunders's, direction and personally dictated by me on 03/09/2021 . I have reviewed the chart and agree that the record accurately reflects my personal performance of the history, physical exam, assessment and plan. I have also personally directed, reviewed, and agree with the discharge instructions. \par

## 2021-03-09 NOTE — ASSESSMENT
[FreeTextEntry1] : Ms. Phipps is a 75 year old female with a history of recently diverticulosis, GERD, collagenous colitis, asthma, allergies, TBM, OSAS who now comes  ?PNA complicated by tracheomalacia. She is s/p tracheoplasty with bronchospasm. - s/p asthmatic type bronchitis and GERD. She is currently stable from a pulmonary perspective; her number one issue is intermittent cough. \par \par Her cough and shortness of breath is related to:\par -acquired tracheobronchomalacia \par -asthma  - Active\par -abnormal hypersensitivity panel \par -post nasal drip syndrome\par -GERD (Active)\par \par problem 1: severe persistent asthma\par -continue to use Xopenex 1.25 via nebulizer Q8H (in the morning)\par -continue to use Pulmicort 0.5 BID (after Xopenex)\par -followed by Spiriva 1 inhalation QD\par -continue to use Singulair 10 mg QHS\par \par -Asthma is believed to be caused by inherited (genetic) and environmental factor, but its exact cause is unknown. Asthma may be triggered by allergens, lung infections, or irritants in the air. Asthma triggers are different for each person\par -Inhaler technique reviewed as well as oral hygiene techniques reviewed with patient. Avoidance of cold air, extremes of temperature, rescue inhaler should be used before exercise. Order of medication reviewed with patient. Recommended use of a cool mist humidifier in the bedroom. \par \par problem 3: sensory neuropathic cough\par -off Elavil 25 mg up to TID- QHS- on the shelf \par -continue Baclofen 10mg pre-meal, PRN\par -continue Hycodan syrup - (ISTOP)\par \par -Sensory neuropathic cough is an etiology of cough that is often realized once someone has been ruled out for common disease such as: asthma, COPD, eosinophilic bronchitis, bronchiectasis, post nasal drip, and GERD. It sometimes develops following a URI, herpes zoster outbreak in pharynx or thyroid or cervical spine injury. However, many patients have no identifiable antecedent explanation. \par \par problem 4: tracheobronchomalacia\par -s/p surgery with Dr. Boyd Ramirez in 11/2017\par -s/p bronchoscopy with Dr. Ramirez \par \par Tracheomalacia is usually acquired in adults and common causes include damage by tracheostomy or endotracheal intubation damaging the tracheal cartilage with increase risk with multiple intubations, prolonged intubation, and concurrent high dose steroid therapy; external chest wall trauma and surgery; chronic compression of the trachea by benign etiologies (eg, benign mediastinal goiter) or malignancy; relapsing polychondritis; or recurrent infection. Tracheomalacia can be asymptomatic, however signs or symptoms can develop as the severity of the airway narrowing progresses with major symptoms include dyspnea, cough, and sputum retention. Other symptoms include severe paroxysms of coughing, wheezing or stridor, barking cough and may be exacerbated by forced expiration, cough, and Valsalva maneuver. Tracheomalacia is diagnosed by a bronchoscopic visualization of dynamic airway collapse on dynamic chest CT. Therapy is warranted in symptomatic patients with severe tracheomalacia and includes surgical repair as tracheobronchoplasty. The patient was referred to Dr. Matt Ramirez or Dr. Tai Kohli, at [facility] for a surgical consult.\par \par problem 5: abnormal hypersensitivity panel/allergic panel\par -s/p allergist evaluation - no treatment recommended at this time\par \par -Environmental measures for allergies were encouraged including mattress and pillow cover, air purifier, and environmental controls. \par \par Problem 6: s/p mycoplasma pneumonia \par -s/p prolonged Zithromax for anti inflammatory\par \par problem 7: overweight\par -Weight loss, exercise, and diet control were discussed and are highly encouraged. Treatment options were given such as, aqua therapy, and contacting a nutritionist. Recommended to use the elliptical, stationary bike, less use of treadmill. Obesity is associated with worsening asthma, shortness of breath, and potential for cardiac disease, diabetes, and other underlying medical conditions.\par \par problem 8: GERD/LPR (?Active)\par -continue Baclofen 10mg pre-meal - noncompliant\par -continue to use Protonix 40 mg before breakfast\par -continue Pepcid 40 mg QHS\par -recommended [employer] Probiotic supplement \par -Recommended to chew on DGL (diglyceride licorice root) before breakfast and dinner \par -Recommended to take S[place]ppery [place]m or drink Slippery Elm Tea (Throat Coat Tea) \par \par -Rule of 2s: avoid eating too much, eating too late, eating too spicy, eating two hours before bed\par -Things to avoid including overeating, spicy foods, tight clothing, eating within three hours of bed, this list is not all inclusive. \par -For treatment of reflux, possible options discussed including diet control, H2 blockers, PPIs, as well as coating motility agents discussed as treatment options. Timing of meals and proximity of last meal to sleep were discussed. If symptoms persist, a formal gastrointestinal evaluation is needed. \par \par problem 9: dysphonia\par -recommended to use Fisherman's Friend Lozengers\par -felt to be related to cough causing vocal cord trauma, LPR, medication effect\par -recommended hydration and time \par \par problem 10: allergic rhinitis\par -recommended xlear saline \par -recommended OTC antihistamine PRN (Claritin 10 mg QAM)\par \par -Environmental measures for allergies were encouraged including mattress and pillow cover, air purifier, and environmental controls.\par \par problem 11: SUSAN\par -continue Rozerem 8mg qhs\par -recommended to use "Chin Strap" \par -recommended to take nocturnal medications earlier; OxyAid\par -recommended to have a mouth piece made - denied; failed CPAP\par -based on her Nunez's esophagus, EDS, ? snoring, elevated mallampati class, and poor sleep; she is being set up for an at home sleep study \par \par Sleep apnea is associated with adverse clinical consequences which an affect most organ systems. Cardiovascular disease risk includes arrhythmias, atrial fibrillation, hypertension, coronary artery disease, and stroke. Metabolic disorders include diabetes type 2, non-alcoholic fatty liver disease. Mood disorder especially depression; and cognitive decline especially in the elderly. Associations with chronic reflux/Nunez’s esophagus some but not all inclusive. \par -Reasons include arousal consistent with hypopnea; respiratory events most prominent in REM sleep or supine position; therefore sleep staging and body position are important for accurate diagnosis and estimation of AHI.\par -Good sleep hygiene was encouraged including avoiding watching television an hour before bed, keeping caffeine at a low, avoiding reading, television, or anything, in bed, no drinking any liquids three hours before bedtime, and only getting into bed when tired and ready for sleep. \par \par Problem 12: Health Maintenance/COVID19 Precautions \par - Clean your hands often. Wash your hands often with soap and water for at least 20 seconds, especially after blowing your nose, coughing, or sneezing, or having been in a public place.\par - If soap and water are not available, use a hand  that contains at least 60% alcohol.\par - To the extent possible, avoid touching high-touch surfaces in public places - elevator buttons, door handles, handrails, handshaking with people, etc. Use a tissue or your sleeve to cover your hand or finger if you must touch something.\par - Wash your hands after touching surfaces in public places.\par - Avoid touching your face, nose, eyes, etc.\par - Clean and disinfect your home to remove germs: practice routine cleaning of frequently touched surfaces (for example: tables, doorknobs, light switches, handles, desks, toilets, faucets, sinks & cell phones)\par - Avoid crowds, especially in poorly ventilated spaces. Your risk of exposure to respiratory viruses like COVID-19 may increase in crowded, closed-in settings with little air circulation if there are people in the crowd who are sick. All patients are recommended to practice social distancing and stay at least 6 feet away from others. \par - Avoid all non-essential travel including plane trips, and especially avoid embarking on cruise ships.\par -If COVID-19 is spreading in your community, take extra measures to put distance between yourself and other people to further reduce your risk of being exposed to this new virus.\par -Stay home as much as possible.\par - Consider ways of getting food brought to your house through family, social, or commercial networks\par -Be aware that the virus has been known to live in the air up to 3 hours post exposure, cardboard up to 24 hours post exposure, copper up to 4 hours post exposure, steel and plastic up to 2-3 days post exposure. Risk of transmission from these surfaces are affected by many variables.\par COVID-19 precautionary Immune Support Recommendations:\par -OTC Vitamin C 500mg BID \par -OTC Quercetin 250-500mg BID \par -OTC Zinc 75-100mg per day \par -OTC Melatonin 1 or 2mg a night \par -OTC Vitamin D 1-4000mg per day \par -OTC Tonic Water 8oz per day\par -Water 0.5-1 gallon per day\par Asthma and COVID19:\par You need to make sure your asthma is under control. This often requires the use of inhaled corticosteroids (and sometimes oral corticosteroids). Inhaled corticosteroids do not likely reduce your immune system’s ability to fight infections, but oral corticosteroids may. It is important to use the steps above to protect yourself to limit your exposure to any respiratory virus. \par \par problem 13: health maintenance \par -recommended a yearly flu shot after October 15 2018 (refused 2020)\par -recommended strep pneumonia vaccines: Prevnar-13 vaccine, followed by Pneumo vaccine 23 on year following (refused)\par -recommended early intervention for URIs\par -recommended osteoporosis evaluations\par -recommended early dermatological evaluations\par -recommended after the age of 50 to the age of 70, colonoscopy every 5 years\par \par F/U in 3-4 months\par She is encouraged to call with any changes, concerns, or questions.

## 2021-03-24 ENCOUNTER — EMERGENCY (EMERGENCY)
Facility: HOSPITAL | Age: 76
LOS: 1 days | Discharge: HOME | End: 2021-03-24
Attending: EMERGENCY MEDICINE | Admitting: EMERGENCY MEDICINE
Payer: MEDICARE

## 2021-03-24 VITALS
HEART RATE: 77 BPM | HEIGHT: 63 IN | RESPIRATION RATE: 18 BRPM | OXYGEN SATURATION: 96 % | TEMPERATURE: 98 F | DIASTOLIC BLOOD PRESSURE: 77 MMHG | SYSTOLIC BLOOD PRESSURE: 175 MMHG

## 2021-03-24 VITALS
DIASTOLIC BLOOD PRESSURE: 76 MMHG | TEMPERATURE: 98 F | OXYGEN SATURATION: 100 % | RESPIRATION RATE: 16 BRPM | HEART RATE: 74 BPM | SYSTOLIC BLOOD PRESSURE: 159 MMHG

## 2021-03-24 DIAGNOSIS — Z98.890 OTHER SPECIFIED POSTPROCEDURAL STATES: Chronic | ICD-10-CM

## 2021-03-24 DIAGNOSIS — Z90.49 ACQUIRED ABSENCE OF OTHER SPECIFIED PARTS OF DIGESTIVE TRACT: Chronic | ICD-10-CM

## 2021-03-24 DIAGNOSIS — Z98.41 CATARACT EXTRACTION STATUS, RIGHT EYE: Chronic | ICD-10-CM

## 2021-03-24 LAB
ALBUMIN SERPL ELPH-MCNC: 4.5 G/DL — SIGNIFICANT CHANGE UP (ref 3.3–5)
ALP SERPL-CCNC: 45 U/L — SIGNIFICANT CHANGE UP (ref 40–120)
ALT FLD-CCNC: 12 U/L — SIGNIFICANT CHANGE UP (ref 4–33)
ANION GAP SERPL CALC-SCNC: 11 MMOL/L — SIGNIFICANT CHANGE UP (ref 7–14)
APPEARANCE UR: ABNORMAL
APPEARANCE UR: ABNORMAL
AST SERPL-CCNC: 14 U/L — SIGNIFICANT CHANGE UP (ref 4–32)
BASOPHILS # BLD AUTO: 0 K/UL — SIGNIFICANT CHANGE UP (ref 0–0.2)
BASOPHILS NFR BLD AUTO: 0 % — SIGNIFICANT CHANGE UP (ref 0–2)
BILIRUB SERPL-MCNC: 0.3 MG/DL — SIGNIFICANT CHANGE UP (ref 0.2–1.2)
BILIRUB UR-MCNC: NEGATIVE — SIGNIFICANT CHANGE UP
BILIRUB UR-MCNC: NEGATIVE — SIGNIFICANT CHANGE UP
BUN SERPL-MCNC: 17 MG/DL — SIGNIFICANT CHANGE UP (ref 7–23)
CALCIUM SERPL-MCNC: 9.3 MG/DL — SIGNIFICANT CHANGE UP (ref 8.4–10.5)
CHLORIDE SERPL-SCNC: 102 MMOL/L — SIGNIFICANT CHANGE UP (ref 98–107)
CO2 SERPL-SCNC: 26 MMOL/L — SIGNIFICANT CHANGE UP (ref 22–31)
COLOR SPEC: YELLOW — SIGNIFICANT CHANGE UP
COLOR SPEC: YELLOW — SIGNIFICANT CHANGE UP
CREAT SERPL-MCNC: 0.86 MG/DL — SIGNIFICANT CHANGE UP (ref 0.5–1.3)
DIFF PNL FLD: NEGATIVE — SIGNIFICANT CHANGE UP
DIFF PNL FLD: NEGATIVE — SIGNIFICANT CHANGE UP
EOSINOPHIL # BLD AUTO: 0 K/UL — SIGNIFICANT CHANGE UP (ref 0–0.5)
EOSINOPHIL NFR BLD AUTO: 0 % — SIGNIFICANT CHANGE UP (ref 0–6)
GLUCOSE SERPL-MCNC: 165 MG/DL — HIGH (ref 70–99)
GLUCOSE UR QL: NEGATIVE — SIGNIFICANT CHANGE UP
GLUCOSE UR QL: NEGATIVE — SIGNIFICANT CHANGE UP
HCT VFR BLD CALC: 40.6 % — SIGNIFICANT CHANGE UP (ref 34.5–45)
HGB BLD-MCNC: 13.4 G/DL — SIGNIFICANT CHANGE UP (ref 11.5–15.5)
IANC: 4.7 K/UL — SIGNIFICANT CHANGE UP (ref 1.5–8.5)
IMM GRANULOCYTES NFR BLD AUTO: 0.6 % — SIGNIFICANT CHANGE UP (ref 0–1.5)
KETONES UR-MCNC: NEGATIVE — SIGNIFICANT CHANGE UP
KETONES UR-MCNC: NEGATIVE — SIGNIFICANT CHANGE UP
LEUKOCYTE ESTERASE UR-ACNC: NEGATIVE — SIGNIFICANT CHANGE UP
LEUKOCYTE ESTERASE UR-ACNC: NEGATIVE — SIGNIFICANT CHANGE UP
LYMPHOCYTES # BLD AUTO: 0.65 K/UL — LOW (ref 1–3.3)
LYMPHOCYTES # BLD AUTO: 12 % — LOW (ref 13–44)
MCHC RBC-ENTMCNC: 30.3 PG — SIGNIFICANT CHANGE UP (ref 27–34)
MCHC RBC-ENTMCNC: 33 GM/DL — SIGNIFICANT CHANGE UP (ref 32–36)
MCV RBC AUTO: 91.9 FL — SIGNIFICANT CHANGE UP (ref 80–100)
MONOCYTES # BLD AUTO: 0.04 K/UL — SIGNIFICANT CHANGE UP (ref 0–0.9)
MONOCYTES NFR BLD AUTO: 0.7 % — LOW (ref 2–14)
NEUTROPHILS # BLD AUTO: 4.7 K/UL — SIGNIFICANT CHANGE UP (ref 1.8–7.4)
NEUTROPHILS NFR BLD AUTO: 86.7 % — HIGH (ref 43–77)
NITRITE UR-MCNC: NEGATIVE — SIGNIFICANT CHANGE UP
NITRITE UR-MCNC: NEGATIVE — SIGNIFICANT CHANGE UP
NRBC # BLD: 0 /100 WBCS — SIGNIFICANT CHANGE UP
NRBC # FLD: 0 K/UL — SIGNIFICANT CHANGE UP
PH UR: 8 — SIGNIFICANT CHANGE UP (ref 5–8)
PH UR: 8 — SIGNIFICANT CHANGE UP (ref 5–8)
PLATELET # BLD AUTO: 326 K/UL — SIGNIFICANT CHANGE UP (ref 150–400)
POTASSIUM SERPL-MCNC: 3.9 MMOL/L — SIGNIFICANT CHANGE UP (ref 3.5–5.3)
POTASSIUM SERPL-SCNC: 3.9 MMOL/L — SIGNIFICANT CHANGE UP (ref 3.5–5.3)
PROT SERPL-MCNC: 7 G/DL — SIGNIFICANT CHANGE UP (ref 6–8.3)
PROT UR-MCNC: NEGATIVE — SIGNIFICANT CHANGE UP
PROT UR-MCNC: NEGATIVE — SIGNIFICANT CHANGE UP
RBC # BLD: 4.42 M/UL — SIGNIFICANT CHANGE UP (ref 3.8–5.2)
RBC # FLD: 13.7 % — SIGNIFICANT CHANGE UP (ref 10.3–14.5)
SODIUM SERPL-SCNC: 139 MMOL/L — SIGNIFICANT CHANGE UP (ref 135–145)
SP GR SPEC: 1.01 — SIGNIFICANT CHANGE UP (ref 1.01–1.02)
SP GR SPEC: 1.01 — SIGNIFICANT CHANGE UP (ref 1.01–1.02)
TROPONIN T, HIGH SENSITIVITY RESULT: <6 NG/L — SIGNIFICANT CHANGE UP
UROBILINOGEN FLD QL: SIGNIFICANT CHANGE UP
UROBILINOGEN FLD QL: SIGNIFICANT CHANGE UP
WBC # BLD: 5.42 K/UL — SIGNIFICANT CHANGE UP (ref 3.8–10.5)
WBC # FLD AUTO: 5.42 K/UL — SIGNIFICANT CHANGE UP (ref 3.8–10.5)

## 2021-03-24 PROCEDURE — 99284 EMERGENCY DEPT VISIT MOD MDM: CPT | Mod: GC

## 2021-03-24 PROCEDURE — 71045 X-RAY EXAM CHEST 1 VIEW: CPT | Mod: 26

## 2021-03-24 PROCEDURE — 70450 CT HEAD/BRAIN W/O DYE: CPT | Mod: 26

## 2021-03-24 RX ORDER — METOCLOPRAMIDE HCL 10 MG
10 TABLET ORAL ONCE
Refills: 0 | Status: COMPLETED | OUTPATIENT
Start: 2021-03-24 | End: 2021-03-24

## 2021-03-24 RX ADMIN — Medication 10 MILLIGRAM(S): at 20:21

## 2021-03-24 NOTE — ED ADULT NURSE NOTE - NS PRO AD NO ADVANCE DIRECTIVE
Allison, please review and close. Lula [place], please review and order lovenox if needed. See progress note for more information.
No

## 2021-03-24 NOTE — ED PROVIDER NOTE - PROGRESS NOTE DETAILS
Breezy, PGY2: pt feeling back to baseline. CT and labs reviewed, no need for hospitalization pt able to ambulate on her own. VSS. Time was taken to answer all of patients questions and concerns. Return precaution instructions were given and patient understands and feels comfortable with disposition. Breezy, PGY2: spoke with PMD Dr Diaz, updated results, will see pt in office this week

## 2021-03-24 NOTE — ED PROVIDER NOTE - CLINICAL SUMMARY MEDICAL DECISION MAKING FREE TEXT BOX
She - elderly female presents with headache, htn, and left hand numbness after steroid injection for sciatica. all symptoms resolved upon arrival at ED. pt endorses residual left hand tingling. vss, 190/80. PE benign. neuro intact. ddx: steroid injection related HTN. will check labs, troponins, CTA head and neck, reassess. Elderly female presents with headache, htn, and left hand itngling after steroid injection for sciatica. all symptoms resolved prior to ED arrival. Pt endorses residual left hand tingling, wchih resolved in ED. Well appearing.  PE benign with no focal neuro deficits. COnsider sx related to unintentional intravascular injection of either steroid or lidocaine. On specific e/o central origin of sx.  Given pt's docs' concerns will image as above, send labs, and reassess.

## 2021-03-24 NOTE — ED PROVIDER NOTE - PHYSICAL EXAMINATION
Physical Exam:    I have reviewed the triage vital signs.    Gen: NAD, AOx3, non-toxic appearing, able to ambulate without assistance.   Head and Neck: NCAT, Neck supple without meningismus.   HEENT: EOMI, PEERLA, normal conjunctiva, tongue midline, oral mucosa moist.   Lung: CTAB, no respiratory distress, no wheezes/rhonchi/rales B/L, speaking in full sentences.   CV: RRR, no murmurs, rubs or gallops.   Abd: soft, NT, ND, no guarding, no rigidity, no rebound tenderness, no CVA tenderness, no masses.   MSK: no gross deformities, ROM normal in UE/LE, no back tenderness.   Neuro: CNs II-XII grossly intact. No focal sensory or motor deficits.   Skin: Warm, well perfused, no rash, no leg swelling.   Psych: Appropriate mood and affect.

## 2021-03-24 NOTE — ED PROVIDER NOTE - NSFOLLOWUPCLINICS_GEN_ALL_ED_FT
[facility]  Neurology  [address] [place] [address] [address] [place] [address]  Phone: [phone]  Fax:   Follow Up Time: Routine

## 2021-03-24 NOTE — ED PROVIDER NOTE - PATIENT PORTAL LINK FT
You can access the FollowMyHealth Patient Portal offered by [facility] by registering at the following website: [email or web address]. By joining [facility]’s FollowMyHealth portal, you will also be able to view your health information using other applications (apps) compatible with our system.

## 2021-03-24 NOTE — ED PROVIDER NOTE - OBJECTIVE STATEMENT
75F, pmh htn and hl (non complaint with medication), presents to ED after intrathecal steroid injection for left sided sciatica pain. Shortly thereafter pt developed headache, nausea, left hand numbness, and felt bad. She isaias to her PCP who sent her to ED for stroke eval. 75 yr old F c HTN and HLD (non complaint with medication), chronic back pain, presents to ED after interfacet steroid injection for left sided sciatica pain. Shortly thereafter pt developed headache, nausea, left hand "numbness" (paresthesia), and felt "bad". She went to her PCP who sent her to ED for stroke eval.

## 2021-03-24 NOTE — ED ADULT NURSE NOTE - OBJECTIVE STATEMENT
76 y/o female presents to ED from doctors office for HTN, dizziness, numbness to L upper extremity. Pt a&ox4, endorses getting 2 lumbar injections prior to symptoms to L4 & L5. Dizziness has resolve but patient still experiencing numbness to L hand. No neuro deficits noted. Strength equal bilaterally to upper and lower extremities. No facial drift or extremity drift noted. Pt arrives with 20g IV to RAC. MD at bedside for eval. Orders to follow. Will monitor.

## 2021-03-24 NOTE — ED ADULT TRIAGE NOTE - CHIEF COMPLAINT QUOTE
Arrives via EMS for evaluation of HTN after receiving lumbar injection to spine. After lumbar injection, pt walked out from room and pt experienced dizziness, headache numbness to left.  Endorses numbness to left wrist and palm area with headache.  FA 134mgd/dl in field.  Arrives with right AC 20 g.  No neuro deficits or drifts noted, strength equal to all extremities.

## 2021-03-24 NOTE — ED PROVIDER NOTE - PSH
History of appendectomy    History of cataract surgery, right    History of surgery  tracheobronchoplasty 11/2017

## 2021-03-24 NOTE — ED PROVIDER NOTE - ATTENDING CONTRIBUTION TO CARE
Attending Attestation: Dr. Powell  I have personally performed a history and physical examination of the patient and discussed management with the resident as well as the patient.  I reviewed the resident's note and agree with the documented findings and plan of care.  I have authored and modified critical sections of the Provider Note, including but not limited to HPI, Physical Exam and MDM. Elderly female presents with headache, htn, and left hand itngling after steroid injection for sciatica. all symptoms resolved prior to ED arrival. Pt endorses residual left hand tingling, wchih resolved in ED. Well appearing.  PE benign with no focal neuro deficits. COnsider sx related to unintentional intravascular injection of either steroid or lidocaine. On specific e/o central origin of sx.  Given pt's docs' concerns will image as above, send labs, and reassess.

## 2021-03-24 NOTE — ED PROVIDER NOTE - NSFOLLOWUPINSTRUCTIONS_ED_ALL_ED_FT
Back Pain    Back pain is very common in adults. The cause of back pain is rarely dangerous and the pain often gets better over time. The cause of your back pain may not be known and may include strain of muscles or ligaments, degeneration of the spinal disks, or arthritis. Occasionally the pain may radiate down your leg(s). Over-the-counter medicines to reduce pain and inflammation are often the most helpful. Stretching and remaining active frequently helps the healing process.    Your provider today has determined that you do not need an emergent MRI. This does not mean that you may not require an MRI as an outpatient in the future if your pain persists or if you develop additional neurologic symptoms.    It is extremely important for you to follow up with your outpatient provider for further examination and discussion regarding treatment and imaging, if necessary. If you develop any of the following symptoms, return to the ED immediately for re-evaluation:    •Significant trauma or fall, especially if you are over age 65 or have osteoporosis  •Sudden, acute onset of urinary retention or incontinence  •Fecal incontinence  •Loss of sensation (anesthesia) restricted to the area of the buttocks, perineum and inner surfaces of the thighs  •Weakness in the lower limbs    - Please follow up with your Primary Care Doctor within 1 wk    - Please follow up with [facility] Call [phone] to make an appointment Headache    A headache is pain or discomfort felt around the head or neck area. The specific cause of a headache may not be found as there are many types including tension headaches, migraine headaches, and cluster headaches. Watch your condition for any changes. Things you can do to manage your pain include taking over the counter and prescription medications as instructed by your health care provider, lying down in a dark quiet room, limiting stress, getting regular sleep, and refraining from alcohol and tobacco products.    SEEK IMMEDIATE MEDICAL CARE IF YOU HAVE ANY OF THE FOLLOWING SYMPTOMS: fever, vomiting, stiff neck, loss of vision, problems with speech, muscle weakness, loss of balance, trouble walking, passing out, or confusion.     -Please follow up with your Primary Care Doctor within 24-48 hours and bring your paperwork    -Please follow up with neurologist

## 2021-06-06 ENCOUNTER — RX RENEWAL (OUTPATIENT)
Age: 76
End: 2021-06-06

## 2021-06-09 ENCOUNTER — APPOINTMENT (OUTPATIENT)
Dept: PULMONOLOGY | Facility: CLINIC | Age: 76
End: 2021-06-09
Payer: MEDICARE

## 2021-06-09 VITALS
HEART RATE: 83 BPM | HEIGHT: 62 IN | BODY MASS INDEX: 25.76 KG/M2 | TEMPERATURE: 97.9 F | OXYGEN SATURATION: 96 % | WEIGHT: 140 LBS | RESPIRATION RATE: 16 BRPM | DIASTOLIC BLOOD PRESSURE: 70 MMHG | SYSTOLIC BLOOD PRESSURE: 150 MMHG

## 2021-06-09 PROCEDURE — 99214 OFFICE O/P EST MOD 30 MIN: CPT

## 2021-06-09 PROCEDURE — ZZZZZ: CPT

## 2021-06-09 NOTE — HISTORY OF PRESENT ILLNESS
Brief Operative/Procedure Note





- Operation Details


Pre-Op Diagnosis: Lung Cancer


Post-Op Diagnosis: Same


Procedures: PowerPort placement


Surgeon(s)/Proceduralists: Norma Nava MD


Anesthesia: MAC, local


Estimated Blood Loss: Minimal


Findings: Successful placement of 8Fr PowerPort in right subclavian vein


Specimen(s)/Culture(s) Description: None


Complications: None [FreeTextEntry1] : Ms. Phipps is a 75 year old female with a history of allergic rhinitis, Nunez's esophagus, bronchoplasty/tracheoplasty, persistent cough, GERD, LPRD, SUSAN, TBM, and SOB presenting to the office today for a follow up visit. Her chief complaint is sciatic \par \par -she notes moderate sciatica flair limiting ambulation and daily activities\par -she notes back pain exacerbated by extended period driving onset yesterday\par -she notes left arm numbness exacerbated by caring for younger children\par -she notes sleep quality poor\par -she denies palpitations\par -she notes sleep interrupted by  nightly phone call with mother who is 100 years old, because of time difference\par -she notes fatigue residual to poor sleep\par -she notes recent intentional weight loss\par -she notes increased exercise\par \par -denies any headaches, nausea, vomiting, fever, chills, sweats, chest pain, chest pressure, diarrhea, constipation, dysphagia, sour taste in the mouth, dizziness, leg swelling, leg pain, arthralgias, itchy eyes, itchy ears, heartburn, or reflux.\par \par

## 2021-06-09 NOTE — ASSESSMENT
[FreeTextEntry1] : Ms. Phipps is a 75 year old female with a history of recently diverticulosis, GERD, collagenous colitis, asthma, allergies, TBM, OSAS who now comes ?PNA complicated by tracheomalacia. She is s/p tracheoplasty with bronchospasm. - s/p asthmatic type bronchitis and GERD. She is currently stable from a pulmonary perspective; her number one issue is intermittent cough. (stable). \par \par Her cough and shortness of breath is related to:\par -acquired tracheobronchomalacia \par -asthma  - Active\par -abnormal hypersensitivity panel \par -post nasal drip syndrome\par -GERD (Active)\par \par problem 1: severe persistent asthma\par -continue to use Xopenex 1.25 via nebulizer Q8H (in the morning)\par -continue to use Pulmicort 0.5 BID (after Xopenex)\par -followed by Spiriva 1 inhalation QD\par -continue to use Singulair 10 mg QHS\par -Asthma is believed to be caused by inherited (genetic) and environmental factor, but its exact cause is unknown. Asthma may be triggered by allergens, lung infections, or irritants in the air. Asthma triggers are different for each person\par -Inhaler technique reviewed as well as oral hygiene techniques reviewed with patient. Avoidance of cold air, extremes of temperature, rescue inhaler should be used before exercise. Order of medication reviewed with patient. Recommended use of a cool mist humidifier in the bedroom. \par \par problem 3: sensory neuropathic cough\par -off Elavil 25 mg up to TID- QHS- on the shelf \par -continue Baclofen 10mg pre-meal, PRN\par -continue Hycodan syrup - (ISTOP)\par -Sensory neuropathic cough is an etiology of cough that is often realized once someone has been ruled out for common disease such as: asthma, COPD, eosinophilic bronchitis, bronchiectasis, post nasal drip, and GERD. It sometimes develops following a URI, herpes zoster outbreak in pharynx or thyroid or cervical spine injury. However, many patients have no identifiable antecedent explanation. \par \par problem 4: tracheobronchomalacia\par -s/p surgery with Dr. Boyd Ramirez in 11/2017\par -s/p bronchoscopy with Dr. Ramirez\par Tracheomalacia is usually acquired in adults and common causes include damage by tracheostomy or endotracheal intubation damaging the tracheal cartilage with increase risk with multiple intubations, prolonged intubation, and concurrent high dose steroid therapy; external chest wall trauma and surgery; chronic compression of the trachea by benign etiologies (eg, benign mediastinal goiter) or malignancy; relapsing polychondritis; or recurrent infection. Tracheomalacia can be asymptomatic, however signs or symptoms can develop as the severity of the airway narrowing progresses with major symptoms include dyspnea, cough, and sputum retention. Other symptoms include severe paroxysms of coughing, wheezing or stridor, barking cough and may be exacerbated by forced expiration, cough, and Valsalva maneuver. Tracheomalacia is diagnosed by a bronchoscopic visualization of dynamic airway collapse on dynamic chest CT. Therapy is warranted in symptomatic patients with severe tracheomalacia and includes surgical repair as tracheobronchoplasty. The patient was referred to Dr. Matt Ramirez or Dr. Tai Kohli, at [facility] for a surgical consult.\par \par problem 5: abnormal hypersensitivity panel/allergic panel\par -s/p allergist evaluation - no treatment recommended at this time\par -Environmental measures for allergies were encouraged including mattress and pillow cover, air purifier, and environmental controls. \par \par Problem 6: s/p mycoplasma pneumonia \par -s/p prolonged Zithromax for anti inflammatory\par \par problem 7: overweight\par -Weight loss, exercise, and diet control were discussed and are highly encouraged. Treatment options were given such as, aqua therapy, and contacting a nutritionist. Recommended to use the elliptical, stationary bike, less use of treadmill. Obesity is associated with worsening asthma, shortness of breath, and potential for cardiac disease, diabetes, and other underlying medical conditions.\par \par problem 8: GERD/LPR (?Active)\par -continue Baclofen 10mg pre-meal - noncompliant\par -continue to use Protonix 40 mg before breakfast\par -continue Pepcid 40 mg QHS\par -recommended [employer] Probiotic supplement \par -Recommended to chew on DGL (diglyceride licorice root) before breakfast and dinner \par -Recommended to take Slippery Elm or drink Slippery Elm Tea (Throat Coat Tea) \par \par -Rule of 2s: avoid eating too much, eating too late, eating too spicy, eating two hours before bed\par -Things to avoid including overeating, spicy foods, tight clothing, eating within three hours of bed, this list is not all inclusive. \par -For treatment of reflux, possible options discussed including diet control, H2 blockers, PPIs, as well as coating motility agents discussed as treatment options. Timing of meals and proximity of last meal to sleep were discussed. If symptoms persist, a formal gastrointestinal evaluation is needed. \par \par problem 9: dysphonia\par -recommended to use Fisher[employer] Friend Lozenges\par -felt to be related to cough causing vocal cord trauma, LPR, medication effect\par -recommended hydration and time \par \par problem 10: allergic rhinitis\par -recommended xlear saline \par -recommended OTC antihistamine PRN (Claritin 10 mg QAM)\par -recommended Allergist evaluation \par -Environmental measures for allergies were encouraged including mattress and pillow cover, air purifier, and environmental controls.\par \par problem 11: SUSAN\par -continue Rozerem 8mg qhs\par -recommended to use "Chin Strap" \par -recommended to take nocturnal medications earlier; OxyAid\par -recommended to have a mouth piece made - denied; failed CPAP\par -based on her Nunez's esophagus, EDS, ? snoring, elevated mallampati class, and poor sleep; she is being set up for an at home sleep study \par \par Sleep apnea is associated with adverse clinical consequences which an affect most organ systems. Cardiovascular disease risk includes arrhythmias, atrial fibrillation, hypertension, coronary artery disease, and stroke. Metabolic disorders include diabetes type 2, non-alcoholic fatty liver disease. Mood disorder especially depression; and cognitive decline especially in the elderly. Associations with chronic reflux/Nunez’s esophagus some but not all inclusive. \par -Reasons include arousal consistent with hypopnea; respiratory events most prominent in REM sleep or supine position; therefore sleep staging and body position are important for accurate diagnosis and estimation of AHI.\par -Good sleep hygiene was encouraged including avoiding watching television an hour before bed, keeping caffeine at a low, avoiding reading, television, or anything, in bed, no drinking any liquids three hours before bedtime, and only getting into bed when tired and ready for sleep. \par \par Problem 12: Health Maintenance/COVID19 Precautions \par -educated patient on COVID 19 vaccine and appropriate recommendations (hesitant due to allergies) \par - Clean your hands often. Wash your hands often with soap and water for at least 20 seconds, especially after blowing your nose, coughing, or sneezing, or having been in a public place.\par - If soap and water are not available, use a hand  that contains at least 60% alcohol.\par - To the extent possible, avoid touching high-touch surfaces in public places - elevator buttons, door handles, handrails, handshaking with people, etc. Use a tissue or your sleeve to cover your hand or finger if you must touch something.\par - Wash your hands after touching surfaces in public places.\par - Avoid touching your face, nose, eyes, etc.\par - Clean and disinfect your home to remove germs: practice routine cleaning of frequently touched surfaces (for example: tables, doorknobs, light switches, handles, desks, toilets, faucets, sinks & cell phones)\par - Avoid crowds, especially in poorly ventilated spaces. Your risk of exposure to respiratory viruses like COVID-19 may increase in crowded, closed-in settings with little air circulation if there are people in the crowd who are sick. All patients are recommended to practice social distancing and stay at least 6 feet away from others. \par - Avoid all non-essential travel including plane trips, and especially avoid embarking on cruise ships.\par -If COVID-19 is spreading in your community, take extra measures to put distance between yourself and other people to further reduce your risk of being exposed to this new virus.\par -Stay home as much as possible.\par - Consider ways of getting food brought to your house through family, social, or commercial networks\par -Be aware that the virus has been known to live in the air up to 3 hours post exposure, cardboard up to 24 hours post exposure, copper up to 4 hours post exposure, steel and plastic up to 2-3 days post exposure. Risk of transmission from these surfaces are affected by many variables.\par COVID-19 precautionary Immune Support Recommendations:\par -OTC Vitamin C 500mg BID \par -OTC Quercetin 250-500mg BID \par -OTC Zinc 75-100mg per day \par -OTC Melatonin 1 or 2mg a night \par -OTC Vitamin D 1-4000mg per day \par -OTC Tonic Water 8oz per day\par -Water 0.5-1 gallon per day\par Asthma and COVID19:\par You need to make sure your asthma is under control. This often requires the use of inhaled corticosteroids (and sometimes oral corticosteroids). Inhaled corticosteroids do not likely reduce your immune system’s ability to fight infections, but oral corticosteroids may. It is important to use the steps above to protect yourself to limit your exposure to any respiratory virus. \par \par problem 13: health maintenance \par -recommended a yearly flu shot after October 15 2018 (refused 2020)\par -recommended strep pneumonia vaccines: Prevnar-13 vaccine, followed by Pneumo vaccine 23 on year following (refused)\par -recommended early intervention for URIs\par -recommended osteoporosis evaluations\par -recommended early dermatological evaluations\par -recommended after the age of 50 to the age of 70, colonoscopy every 5 years\par \par F/U in 3-4 months\par She is encouraged to call with any changes, concerns, or questions.

## 2021-06-09 NOTE — ADDENDUM
[FreeTextEntry1] : Documented by Dwayne Zabala acting as a scribe for Dr. Denzle Saunders on 06/09/2021.\par \par All medical record entries made by the Scribe were at my, Dr. Denzel Saunders's, direction and personally dictated by me on 06/09/2021 . I have reviewed the chart and agree that the record accurately reflects my personal performance of the history, physical exam, assessment and plan. I have also personally directed, reviewed, and agree with the discharge instructions. \par

## 2021-09-22 ENCOUNTER — RX RENEWAL (OUTPATIENT)
Age: 76
End: 2021-09-22

## 2021-10-13 ENCOUNTER — APPOINTMENT (OUTPATIENT)
Dept: PULMONOLOGY | Facility: CLINIC | Age: 76
End: 2021-10-13
Payer: MEDICARE

## 2021-10-13 VITALS
BODY MASS INDEX: 25.95 KG/M2 | OXYGEN SATURATION: 98 % | DIASTOLIC BLOOD PRESSURE: 80 MMHG | SYSTOLIC BLOOD PRESSURE: 141 MMHG | RESPIRATION RATE: 16 BRPM | TEMPERATURE: 97.5 F | HEART RATE: 71 BPM | WEIGHT: 141 LBS | HEIGHT: 62 IN

## 2021-10-13 PROCEDURE — 94618 PULMONARY STRESS TESTING: CPT

## 2021-10-13 PROCEDURE — 99214 OFFICE O/P EST MOD 30 MIN: CPT | Mod: 25

## 2021-10-13 RX ORDER — PREDNISONE 10 MG/1
10 TABLET ORAL
Qty: 50 | Refills: 0 | Status: DISCONTINUED | COMMUNITY
Start: 2019-04-12 | End: 2021-10-13

## 2021-10-13 RX ORDER — RANITIDINE 300 MG/1
300 TABLET ORAL
Qty: 90 | Refills: 3 | Status: DISCONTINUED | COMMUNITY
Start: 2019-01-24 | End: 2021-10-13

## 2021-10-13 RX ORDER — RANITIDINE 300 MG/1
300 TABLET ORAL
Qty: 90 | Refills: 1 | Status: DISCONTINUED | COMMUNITY
Start: 2018-09-05 | End: 2021-10-13

## 2021-10-13 RX ORDER — RANITIDINE 150 MG/1
150 TABLET ORAL TWICE DAILY
Qty: 1 | Refills: 11 | Status: DISCONTINUED | COMMUNITY
Start: 2018-07-06 | End: 2021-10-13

## 2021-10-13 NOTE — ASSESSMENT
[FreeTextEntry1] : Ms. Phipps is a 75 year old female with a history of recently diverticulosis, GERD, collagenous colitis, asthma, allergies, TBM, OSAS who now comes ?PNA complicated by tracheomalacia. She is s/p tracheoplasty with bronchospasm. - s/p asthmatic type bronchitis and GERD. She is currently stable from a pulmonary perspective; her number one issue is intermittent cough. (stable). - no vaccinated \par \par Her cough and shortness of breath is related to:\par -acquired tracheobronchomalacia \par -asthma  - Active\par -abnormal hypersensitivity panel \par -post nasal drip syndrome\par -GERD (Active)\par \par problem 1: severe persistent asthma-stable \par -continue to use Xopenex 1.25 via nebulizer Q8H (in the morning)\par -continue to use Pulmicort 0.5 BID (after Xopenex)\par -followed by Spiriva 1 inhalation QD\par -continue to use Singulair 10 mg QHS\par -Asthma is believed to be caused by inherited (genetic) and environmental factor, but its exact cause is unknown. Asthma may be triggered by allergens, lung infections, or irritants in the air. Asthma triggers are different for each person\par -Inhaler technique reviewed as well as oral hygiene techniques reviewed with patient. Avoidance of cold air, extremes of temperature, rescue inhaler should be used before exercise. Order of medication reviewed with patient. Recommended use of a cool mist humidifier in the bedroom. \par \par problem 3: sensory neuropathic cough- controlled \par -off Elavil 25 mg up to TID- QHS- on the shelf \par -continue Baclofen 10mg pre-meal, PRN\par -continue Hycodan syrup - (ISTOP)\par -Sensory neuropathic cough is an etiology of cough that is often realized once someone has been ruled out for common disease such as: asthma, COPD, eosinophilic bronchitis, bronchiectasis, post nasal drip, and GERD. It sometimes develops following a URI, herpes zoster outbreak in pharynx or thyroid or cervical spine injury. However, many patients have no identifiable antecedent explanation. \par \par problem 4: tracheobronchomalacia\par -s/p surgery with Dr. Boyd Ramirez in 11/2017\par -s/p bronchoscopy with Dr. Ramirez\par Tracheomalacia is usually acquired in adults and common causes include damage by tracheostomy or endotracheal intubation damaging the tracheal cartilage with increase risk with multiple intubations, prolonged intubation, and concurrent high dose steroid therapy; external chest wall trauma and surgery; chronic compression of the trachea by benign etiologies (eg, benign mediastinal goiter) or malignancy; relapsing polychondritis; or recurrent infection. Tracheomalacia can be asymptomatic, however signs or symptoms can develop as the severity of the airway narrowing progresses with major symptoms include dyspnea, cough, and sputum retention. Other symptoms include severe paroxysms of coughing, wheezing or stridor, barking cough and may be exacerbated by forced expiration, cough, and Valsalva maneuver. Tracheomalacia is diagnosed by a bronchoscopic visualization of dynamic airway collapse on dynamic chest CT. Therapy is warranted in symptomatic patients with severe tracheomalacia and includes surgical repair as tracheobronchoplasty. The patient was referred to Dr. Matt Ramirez or Dr. Tai Kohli, at [facility] for a surgical consult.\par \par problem 5: abnormal hypersensitivity panel/allergic panel\par -s/p allergist evaluation - no treatment recommended at this time\par -Environmental measures for allergies were encouraged including mattress and pillow cover, air purifier, and environmental controls. \par \par Problem 6: s/p mycoplasma pneumonia \par -s/p prolonged Zithromax for anti inflammatory\par \par problem 7: overweight\par -Weight loss, exercise, and diet control were discussed and are highly encouraged. Treatment options were given such as, aqua therapy, and contacting a nutritionist. Recommended to use the elliptical, stationary bike, less use of treadmill. Obesity is associated with worsening asthma, shortness of breath, and potential for cardiac disease, diabetes, and other underlying medical conditions.\par \par problem 8: GERD/LPR (quiet)\par -continue Baclofen 10mg pre-meal - noncompliant\par -continue to use Protonix 40 mg before breakfast\par -continue Pepcid 40 mg QHS\par -recommended [employer] Probiotic supplement \par -Recommended to chew on DGL (diglyceride licorice root) before breakfast and dinner \par -Recommended to take Slippery Elm or drink Slippery Elm Tea (Throat Coat Tea) \par \par -Rule of 2s: avoid eating too much, eating too late, eating too spicy, eating two hours before bed\par -Things to avoid including overeating, spicy foods, tight clothing, eating within three hours of bed, this list is not all inclusive. \par -For treatment of reflux, possible options discussed including diet control, H2 blockers, PPIs, as well as coating motility agents discussed as treatment options. Timing of meals and proximity of last meal to sleep were discussed. If symptoms persist, a formal gastrointestinal evaluation is needed. \par \par problem 9: dysphonia\par -recommended to use Fisherman's Friend Lozenges\par -felt to be related to cough causing vocal cord trauma, LPR, medication effect\par -recommended hydration and time \par \par problem 10: allergic rhinitis\par -recommended xlear saline \par -recommended OTC antihistamine PRN (Claritin 10 mg QAM)\par -recommended Allergist evaluation \par -Environmental measures for allergies were encouraged including mattress and pillow cover, air purifier, and environmental controls.\par \par problem 11: SUSAN\par -continue Rozerem 8mg qhs\par -recommended to use "Chin Strap" \par -recommended to take nocturnal medications earlier; OxyAid\par -recommended to have a mouth piece made - denied; failed CPAP\par -based on her Nunez's esophagus, EDS, ? snoring, elevated mallampati class, and poor sleep; she is being set up for an at home sleep study \par \par Sleep apnea is associated with adverse clinical consequences which an affect most organ systems. Cardiovascular disease risk includes arrhythmias, atrial fibrillation, hypertension, coronary artery disease, and stroke. Metabolic disorders include diabetes type 2, non-alcoholic fatty liver disease. Mood disorder especially depression; and cognitive decline especially in the elderly. Associations with chronic reflux/Nunez’s esophagus some but not all inclusive. \par -Reasons include arousal consistent with hypopnea; respiratory events most prominent in REM sleep or supine position; therefore sleep staging and body position are important for accurate diagnosis and estimation of AHI.\par -Good sleep hygiene was encouraged including avoiding watching television an hour before bed, keeping caffeine at a low, avoiding reading, television, or anything, in bed, no drinking any liquids three hours before bedtime, and only getting into bed when tired and ready for sleep. \par \par Problem 12: Health Maintenance/COVID19 Precautions \par -VACCINE HESITANT \par -Covid 19 precaution, vaccine and booster discussed at length and recommended \par -educated patient on COVID 19 vaccine and appropriate recommendations (hesitant due to allergies) \par - Clean your hands often. Wash your hands often with soap and water for at least 20 seconds, especially after blowing your nose, coughing, or sneezing, or having been in a public place.\par - If soap and water are not available, use a hand  that contains at least 60% alcohol.\par - To the extent possible, avoid touching high-touch surfaces in public places - elevator buttons, door handles, handrails, handshaking with people, etc. Use a tissue or your sleeve to cover your hand or finger if you must touch something.\par - Wash your hands after touching surfaces in public places.\par - Avoid touching your face, nose, eyes, etc.\par - Clean and disinfect your home to remove germs: practice routine cleaning of frequently touched surfaces (for example: tables, doorknobs, light switches, handles, desks, toilets, faucets, sinks & cell phones)\par - Avoid crowds, especially in poorly ventilated spaces. Your risk of exposure to respiratory viruses like COVID-19 may increase in crowded, closed-in settings with little air circulation if there are people in the crowd who are sick. All patients are recommended to practice social distancing and stay at least 6 feet away from others. \par - Avoid all non-essential travel including plane trips, and especially avoid embarking on cruise ships.\par -If COVID-19 is spreading in your community, take extra measures to put distance between yourself and other people to further reduce your risk of being exposed to this new virus.\par -Stay home as much as possible.\par - Consider ways of getting food brought to your house through family, social, or commercial networks\par -Be aware that the virus has been known to live in the air up to 3 hours post exposure, cardboard up to 24 hours post exposure, copper up to 4 hours post exposure, steel and plastic up to 2-3 days post exposure. Risk of transmission from these surfaces are affected by many variables.\par COVID-19 precautionary Immune Support Recommendations:\par -OTC Vitamin C 500mg BID \par -OTC Quercetin 250-500mg BID \par -OTC Zinc 75-100mg per day \par -OTC Melatonin 1 or 2mg a night \par -OTC Vitamin D 1-4000mg per day \par -OTC Tonic Water 8oz per day\par -Water 0.5-1 gallon per day\par Asthma and COVID19:\par You need to make sure your asthma is under control. This often requires the use of inhaled corticosteroids (and sometimes oral corticosteroids). Inhaled corticosteroids do not likely reduce your immune system’s ability to fight infections, but oral corticosteroids may. It is important to use the steps above to protect yourself to limit your exposure to any respiratory virus. \par \par problem 13: health maintenance \par -recommended a yearly flu shot after October 15 2018 (refused 2020)\par -recommended strep pneumonia vaccines: Prevnar-13 vaccine, followed by Pneumo vaccine 23 on year following (refused)\par -recommended early intervention for URIs\par -recommended osteoporosis evaluations\par -recommended early dermatological evaluations\par -recommended after the age of 50 to the age of 70, colonoscopy every 5 years\par \par F/U in 3-4 months\par She is encouraged to call with any changes, concerns, or questions.

## 2021-10-13 NOTE — PROCEDURE
[FreeTextEntry1] : 6 minute walk test reveals a low saturation of 98% with very slight dyspnea or fatigue; walked 586.8 meters\par

## 2021-10-13 NOTE — HISTORY OF PRESENT ILLNESS
[FreeTextEntry1] : Ms. Phipps is a 75 year old female with a history of allergic rhinitis, Nunez's esophagus, bronchoplasty/tracheoplasty, persistent cough, GERD, LPRD, SUSAN, TBM, and SOB presenting to the office today for a follow up visit. Her chief complaint is sciatic \par \par -she notes walking for exercise\par -she notes intermittent headaches \par -she notes family is okay but daughter got COVID 19 \par -she notes wearing titanium belt to help with back\par -she notes weight is stable \par -she denies coughing and wheezing \par -she denies vaccination but might take J.J in November to go to [place]. \par -she notes use of ZINC\par -she notes intermittent reflux that is controlled \par -she denies SOB\par \par - She  denies any visual issues, nausea, vomiting, fever, chills, sweats, chest pains, chest pressure, diarrhea, constipation, dysphagia, myalgia, dizziness, leg swelling, leg pain, itchy eyes, itchy ears, heartburn, reflux, or sour taste in the mouth.

## 2021-10-13 NOTE — ADDENDUM
[FreeTextEntry1] : Documented by Baldemar Hernandes acting as a scribe for Dr. Denzel Saunders on 10/13/2021 \par \par All medical record entries made by the Scribe were at my, Dr. Denzel Saunders's, direction and personally dictated by me on 10/13/2021 . I have reviewed the chart and agree that the record accurately reflects my personal performance of the history, physical exam, assessment and plan. I have also personally directed, reviewed, and agree with the discharge instructions

## 2022-02-10 ENCOUNTER — NON-APPOINTMENT (OUTPATIENT)
Age: 77
End: 2022-02-10

## 2022-02-16 ENCOUNTER — APPOINTMENT (OUTPATIENT)
Dept: PULMONOLOGY | Facility: CLINIC | Age: 77
End: 2022-02-16
Payer: MEDICARE

## 2022-02-16 VITALS
RESPIRATION RATE: 16 BRPM | HEIGHT: 62 IN | BODY MASS INDEX: 25.76 KG/M2 | SYSTOLIC BLOOD PRESSURE: 110 MMHG | DIASTOLIC BLOOD PRESSURE: 60 MMHG | WEIGHT: 140 LBS | TEMPERATURE: 97.2 F | HEART RATE: 97 BPM | OXYGEN SATURATION: 98 %

## 2022-02-16 DIAGNOSIS — J45.909 UNSPECIFIED ASTHMA, UNCOMPLICATED: ICD-10-CM

## 2022-02-16 PROCEDURE — 99214 OFFICE O/P EST MOD 30 MIN: CPT

## 2022-02-16 RX ORDER — GABAPENTIN 100 MG/1
100 CAPSULE ORAL 3 TIMES DAILY
Qty: 90 | Refills: 2 | Status: ACTIVE | COMMUNITY
Start: 2022-02-16 | End: 1900-01-01

## 2022-02-16 NOTE — PROCEDURE
[FreeTextEntry1] : X-Ray Chest 1 View (2/11/2022) revealed \par Facility: [facility] \par Findings:\par Cardiac: The heart appears unremarkable. \par Mediastinum: Trachea midline. Mediastinal contour unremarkable\par Lungs/Pleura: The lungs are clear. There is no alveolar consolidation, effusion, or pneumothorax. \par Skeletal System: There are no acute bony or soft tissue abnormalities. \par Impression: No acute cardiopulmonary process.

## 2022-02-16 NOTE — ASSESSMENT
[FreeTextEntry1] : Ms. Phipps is a 76 year old female with a history of recently diverticulosis, GERD, collagenous colitis, asthma, allergies, TBM, OSAS who now comes ?PNA complicated by tracheomalacia. She is s/p tracheoplasty with bronchospasm. - s/p asthmatic type bronchitis and GERD. She is currently stable from a pulmonary perspective; her number one issue is active asthma. \par \par Her cough and shortness of breath is related to:\par -acquired tracheobronchomalacia \par -asthma  - Active\par -abnormal hypersensitivity panel \par -post nasal drip syndrome\par -GERD (Active)\par \par problem 1: severe persistent asthma-(Active)\par -continue to use Xopenex (.63) via nebulizer Q8H (in the morning)\par -continue to use Pulmicort 0.5 BID (after Xopenex)\par -followed by Spiriva 1 inhalation QD\par -continue to use Singulair 10 mg QHS\par -Add Prednisone 20mg for 7 days then 10mg for 7 days \par -Information sheet given to the patient to be reviewed, this medication is never to be used without consulting the prescribing physician. Proper dietary restraint is necessary specifically salt containing foods, if any reaction may occur should be reported. \par -Asthma is believed to be caused by inherited (genetic) and environmental factor, but its exact cause is unknown. Asthma may be triggered by allergens, lung infections, or irritants in the air. Asthma triggers are different for each person\par -Inhaler technique reviewed as well as oral hygiene techniques reviewed with patient. Avoidance of cold air, extremes of temperature, rescue inhaler should be used before exercise. Order of medication reviewed with patient. Recommended use of a cool mist humidifier in the bedroom. \par \par problem 3: sensory neuropathic cough- Active \par -off Elavil 25 mg up to TID- QHS- on the shelf \par -continue Baclofen 10mg pre-meal, PRN\par -continue Hycodan syrup - (ISTOP)\par -Add Neurontin 100 qHS \par -Sensory neuropathic cough is an etiology of cough that is often realized once someone has been ruled out for common disease such as: asthma, COPD, eosinophilic bronchitis, bronchiectasis, post nasal drip, and GERD. It sometimes develops following a URI, herpes zoster outbreak in pharynx or thyroid or cervical spine injury. However, many patients have no identifiable antecedent explanation. \par \par problem 4: tracheobronchomalacia\par -s/p surgery with Dr. Boyd Ramirez in 11/2017\par -s/p bronchoscopy with Dr. Ramirez\par Tracheomalacia is usually acquired in adults and common causes include damage by tracheostomy or endotracheal intubation damaging the tracheal cartilage with increase risk with multiple intubations, prolonged intubation, and concurrent high dose steroid therapy; external chest wall trauma and surgery; chronic compression of the trachea by benign etiologies (eg, benign mediastinal goiter) or malignancy; relapsing polychondritis; or recurrent infection. Tracheomalacia can be asymptomatic, however signs or symptoms can develop as the severity of the airway narrowing progresses with major symptoms include dyspnea, cough, and sputum retention. Other symptoms include severe paroxysms of coughing, wheezing or stridor, barking cough and may be exacerbated by forced expiration, cough, and Valsalva maneuver. Tracheomalacia is diagnosed by a bronchoscopic visualization of dynamic airway collapse on dynamic chest CT. Therapy is warranted in symptomatic patients with severe tracheomalacia and includes surgical repair as tracheobronchoplasty. The patient was referred to Dr. Matt Ramirez or Dr. Tai Kohli, at [facility] for a surgical consult.\par \par problem 5: abnormal hypersensitivity panel/allergic panel\par -s/p allergist evaluation - no treatment recommended at this time\par -Environmental measures for allergies were encouraged including mattress and pillow cover, air purifier, and environmental controls. \par \par Problem 6: s/p mycoplasma pneumonia \par -s/p prolonged Zithromax for anti inflammatory\par \par problem 7: overweight (resolved)\par -Weight loss, exercise, and diet control were discussed and are highly encouraged. Treatment options were given such as, aqua therapy, and contacting a nutritionist. Recommended to use the elliptical, stationary bike, less use of treadmill. Obesity is associated with worsening asthma, shortness of breath, and potential for cardiac disease, diabetes, and other underlying medical conditions.\par \par problem 8: GERD/LPR (quiet)\par -continue Baclofen 10mg pre-meal - noncompliant\par -continue to use Protonix 40 mg before breakfast\par -continue Pepcid 40 mg QHS\par -recommended [employer] Probiotic supplement \par -Recommended to chew on DGL (diglyceride licorice root) before breakfast and dinner \par -Recommended to take Slippery Elm or drink Slippery Elm Tea (Throat Coat Tea) \par \par -Rule of 2s: avoid eating too much, eating too late, eating too spicy, eating two hours before bed\par -Things to avoid including overeating, spicy foods, tight clothing, eating within three hours of bed, this list is not all inclusive. \par -For treatment of reflux, possible options discussed including diet control, H2 blockers, PPIs, as well as coating motility agents discussed as treatment options. Timing of meals and proximity of last meal to sleep were discussed. If symptoms persist, a formal gastrointestinal evaluation is needed. \par \par problem 9: dysphonia\par -recommended to use Fisherman's Friend Lozenges\par -felt to be related to cough causing vocal cord trauma, LPR, medication effect\par -recommended hydration and time \par \par problem 10: allergic rhinitis\par -recommended xlear saline \par -recommended OTC antihistamine PRN (Claritin 10 mg QAM)\par -recommended Allergist evaluation \par -Environmental measures for allergies were encouraged including mattress and pillow cover, air purifier, and environmental controls.\par \par problem 11: SUSAN\par -continue Rozerem 8mg qhs\par -recommended to use "Chin Strap" \par -recommended to take nocturnal medications earlier; OxyAid\par -recommended to have a mouth piece made - denied; failed CPAP\par -based on her Nunez's esophagus, EDS, ? snoring, elevated mallampati class, and poor sleep; she is being set up for an at home sleep study \par \par Sleep apnea is associated with adverse clinical consequences which an affect most organ systems. Cardiovascular disease risk includes arrhythmias, atrial fibrillation, hypertension, coronary artery disease, and stroke. Metabolic disorders include diabetes type 2, non-alcoholic fatty liver disease. Mood disorder especially depression; and cognitive decline especially in the elderly. Associations with chronic reflux/Nunez’s esophagus some but not all inclusive. \par -Reasons include arousal consistent with hypopnea; respiratory events most prominent in REM sleep or supine position; therefore sleep staging and body position are important for accurate diagnosis and estimation of AHI.\par -Good sleep hygiene was encouraged including avoiding watching television an hour before bed, keeping caffeine at a low, avoiding reading, television, or anything, in bed, no drinking any liquids three hours before bedtime, and only getting into bed when tired and ready for sleep. \par \par Problem 12: Health Maintenance/COVID19 Precautions \par -VACCINE HESITANT \par -Covid 19 precaution, vaccine and booster discussed at length and recommended \par -educated patient on COVID 19 vaccine and appropriate recommendations (hesitant due to allergies) \par - Clean your hands often. Wash your hands often with soap and water for at least 20 seconds, especially after blowing your nose, coughing, or sneezing, or having been in a public place.\par - If soap and water are not available, use a hand  that contains at least 60% alcohol.\par - To the extent possible, avoid touching high-touch surfaces in public places - elevator buttons, door handles, handrails, handshaking with people, etc. Use a tissue or your sleeve to cover your hand or finger if you must touch something.\par - Wash your hands after touching surfaces in public places.\par - Avoid touching your face, nose, eyes, etc.\par - Clean and disinfect your home to remove germs: practice routine cleaning of frequently touched surfaces (for example: tables, doorknobs, light switches, handles, desks, toilets, faucets, sinks & cell phones)\par - Avoid crowds, especially in poorly ventilated spaces. Your risk of exposure to respiratory viruses like COVID-19 may increase in crowded, closed-in settings with little air circulation if there are people in the crowd who are sick. All patients are recommended to practice social distancing and stay at least 6 feet away from others. \par - Avoid all non-essential travel including plane trips, and especially avoid embarking on cruise ships.\par -If COVID-19 is spreading in your community, take extra measures to put distance between yourself and other people to further reduce your risk of being exposed to this new virus.\par -Stay home as much as possible.\par - Consider ways of getting food brought to your house through family, social, or commercial networks\par -Be aware that the virus has been known to live in the air up to 3 hours post exposure, cardboard up to 24 hours post exposure, copper up to 4 hours post exposure, steel and plastic up to 2-3 days post exposure. Risk of transmission from these surfaces are affected by many variables.\par COVID-19 precautionary Immune Support Recommendations:\par -OTC Vitamin C 500mg BID \par -OTC Quercetin 250-500mg BID \par -OTC Zinc 75-100mg per day \par -OTC Melatonin 1 or 2mg a night \par -OTC Vitamin D 1-4000mg per day \par -OTC Tonic Water 8oz per day\par -Water 0.5-1 gallon per day\par Asthma and COVID19:\par You need to make sure your asthma is under control. This often requires the use of inhaled corticosteroids (and sometimes oral corticosteroids). Inhaled corticosteroids do not likely reduce your immune system’s ability to fight infections, but oral corticosteroids may. It is important to use the steps above to protect yourself to limit your exposure to any respiratory virus. \par \par problem 13: health maintenance \par -recommended a yearly flu shot after October 15 2018 (refused 2020)\par -recommended strep pneumonia vaccines: Prevnar-13 vaccine, followed by Pneumo vaccine 23 on year following (refused)\par -recommended early intervention for URIs\par -recommended osteoporosis evaluations\par -recommended early dermatological evaluations\par -recommended after the age of 50 to the age of 70, colonoscopy every 5 years\par \par F/U in 3-4 months\par She is encouraged to call with any changes, concerns, or questions.

## 2022-02-16 NOTE — HISTORY OF PRESENT ILLNESS
[FreeTextEntry1] : Ms. Phipps is a 75 year old female with a history of allergic rhinitis, Nunez's esophagus, bronchoplasty/tracheoplasty, persistent cough, GERD, LPRD, SUSAN, TBM, and SOB presenting to the office today for a sick visit. Her chief complaint is \par - she notes she started coughing, she has been coughing for awhile. She feels it may be due to the weather. \par - she notes she was in the hospital  [employer] so cardiovascular tachycardia. She was there for half a day. \par - she notes her HR was 175 bpm. Not sure what caused it. She had EKG, CXR, and blood test. Nothing was significant. \par - then she was tachycardic on Friday, she went to [facility]. \par - she notes she is wheezing sometimes, by the throat. She feels when mucus collects in her throat she wheezes. \par - she notes she has a nebulizer but her medication has . \par patient denies any headaches, nausea, vomiting, fever, chills, sweats, chest pain, chest pressure, palpitations, coughing, wheezing, fatigue, diarrhea, constipation, dysphagia, myalgias, dizziness, leg swelling, leg pain, itchy eyes, itchy ears, heartburn, reflux or sour taste in the mouth\par

## 2022-02-16 NOTE — ADDENDUM
[FreeTextEntry1] : **Amended by Brad Dyer acting as a scribe for Dr. Denzel Saunders on 02/16/2022** \par \par Documented by Sydney Green acting as a scribe for Dr. Denzel Saunders on (02/16/2022).\par \par All medical record entries made by the Scribe were at my, Dr. Denzel Saunders's, direction and personally dictated by me on (02/16/2022). I have reviewed the chart and agree that the record accurately reflects my personal performance of the history, physical exam, assessment and plan. I have also personally directed, reviewed, and agree with the discharge instructions.\par

## 2022-04-11 PROBLEM — J04.0 REFLUX LARYNGITIS: Status: ACTIVE | Noted: 2018-05-30

## 2022-05-26 ENCOUNTER — OUTPATIENT (OUTPATIENT)
Dept: OUTPATIENT SERVICES | Facility: HOSPITAL | Age: 77
LOS: 1 days | End: 2022-05-26
Payer: MEDICARE

## 2022-05-26 ENCOUNTER — NON-APPOINTMENT (OUTPATIENT)
Age: 77
End: 2022-05-26

## 2022-05-26 ENCOUNTER — APPOINTMENT (OUTPATIENT)
Dept: THORACIC SURGERY | Facility: CLINIC | Age: 77
End: 2022-05-26
Payer: MEDICARE

## 2022-05-26 VITALS
HEIGHT: 62 IN | OXYGEN SATURATION: 95 % | WEIGHT: 138 LBS | HEART RATE: 85 BPM | RESPIRATION RATE: 16 BRPM | DIASTOLIC BLOOD PRESSURE: 71 MMHG | SYSTOLIC BLOOD PRESSURE: 141 MMHG | TEMPERATURE: 97.2 F | BODY MASS INDEX: 25.4 KG/M2

## 2022-05-26 DIAGNOSIS — Z01.812 ENCOUNTER FOR PREPROCEDURAL LABORATORY EXAMINATION: ICD-10-CM

## 2022-05-26 DIAGNOSIS — J45.909 UNSPECIFIED ASTHMA, UNCOMPLICATED: ICD-10-CM

## 2022-05-26 DIAGNOSIS — Z98.41 CATARACT EXTRACTION STATUS, RIGHT EYE: Chronic | ICD-10-CM

## 2022-05-26 DIAGNOSIS — R89.9 UNSPECIFIED ABNORMAL FINDING IN SPECIMENS FROM OTHER ORGANS, SYSTEMS AND TISSUES: ICD-10-CM

## 2022-05-26 DIAGNOSIS — Z90.49 ACQUIRED ABSENCE OF OTHER SPECIFIED PARTS OF DIGESTIVE TRACT: Chronic | ICD-10-CM

## 2022-05-26 DIAGNOSIS — Z98.890 OTHER SPECIFIED POSTPROCEDURAL STATES: Chronic | ICD-10-CM

## 2022-05-26 DIAGNOSIS — Z09 ENCOUNTER FOR FOLLOW-UP EXAMINATION AFTER COMPLETED TREATMENT FOR CONDITIONS OTHER THAN MALIGNANT NEOPLASM: ICD-10-CM

## 2022-05-26 DIAGNOSIS — Z01.818 ENCOUNTER FOR OTHER PREPROCEDURAL EXAMINATION: ICD-10-CM

## 2022-05-26 LAB
ALBUMIN SERPL ELPH-MCNC: 4.3 G/DL — SIGNIFICANT CHANGE UP (ref 3.3–5)
ALP SERPL-CCNC: 50 U/L — SIGNIFICANT CHANGE UP (ref 40–120)
ALT FLD-CCNC: 16 U/L — SIGNIFICANT CHANGE UP (ref 10–45)
ANION GAP SERPL CALC-SCNC: 12 MMOL/L — SIGNIFICANT CHANGE UP (ref 5–17)
APTT BLD: 29.5 SEC — SIGNIFICANT CHANGE UP (ref 27.5–35.5)
AST SERPL-CCNC: 28 U/L — SIGNIFICANT CHANGE UP (ref 10–40)
BASOPHILS # BLD AUTO: 0.03 K/UL — SIGNIFICANT CHANGE UP (ref 0–0.2)
BASOPHILS NFR BLD AUTO: 0.4 % — SIGNIFICANT CHANGE UP (ref 0–2)
BILIRUB SERPL-MCNC: 0.2 MG/DL — SIGNIFICANT CHANGE UP (ref 0.2–1.2)
BUN SERPL-MCNC: 18 MG/DL — SIGNIFICANT CHANGE UP (ref 7–23)
CALCIUM SERPL-MCNC: 9.5 MG/DL — SIGNIFICANT CHANGE UP (ref 8.4–10.5)
CHLORIDE SERPL-SCNC: 105 MMOL/L — SIGNIFICANT CHANGE UP (ref 96–108)
CO2 SERPL-SCNC: 24 MMOL/L — SIGNIFICANT CHANGE UP (ref 22–31)
CREAT SERPL-MCNC: 0.83 MG/DL — SIGNIFICANT CHANGE UP (ref 0.5–1.3)
EGFR: 73 ML/MIN/1.73M2 — SIGNIFICANT CHANGE UP
EOSINOPHIL # BLD AUTO: 0.33 K/UL — SIGNIFICANT CHANGE UP (ref 0–0.5)
EOSINOPHIL NFR BLD AUTO: 4 % — SIGNIFICANT CHANGE UP (ref 0–6)
GLUCOSE SERPL-MCNC: 98 MG/DL — SIGNIFICANT CHANGE UP (ref 70–99)
HCT VFR BLD CALC: 39 % — SIGNIFICANT CHANGE UP (ref 34.5–45)
HGB BLD-MCNC: 13.1 G/DL — SIGNIFICANT CHANGE UP (ref 11.5–15.5)
IMM GRANULOCYTES NFR BLD AUTO: 0.2 % — SIGNIFICANT CHANGE UP (ref 0–1.5)
INR BLD: 1.02 — SIGNIFICANT CHANGE UP (ref 0.88–1.16)
LYMPHOCYTES # BLD AUTO: 2.55 K/UL — SIGNIFICANT CHANGE UP (ref 1–3.3)
LYMPHOCYTES # BLD AUTO: 31 % — SIGNIFICANT CHANGE UP (ref 13–44)
MCHC RBC-ENTMCNC: 31.8 PG — SIGNIFICANT CHANGE UP (ref 27–34)
MCHC RBC-ENTMCNC: 33.6 GM/DL — SIGNIFICANT CHANGE UP (ref 32–36)
MCV RBC AUTO: 94.7 FL — SIGNIFICANT CHANGE UP (ref 80–100)
MONOCYTES # BLD AUTO: 0.68 K/UL — SIGNIFICANT CHANGE UP (ref 0–0.9)
MONOCYTES NFR BLD AUTO: 8.3 % — SIGNIFICANT CHANGE UP (ref 2–14)
NEUTROPHILS # BLD AUTO: 4.62 K/UL — SIGNIFICANT CHANGE UP (ref 1.8–7.4)
NEUTROPHILS NFR BLD AUTO: 56.1 % — SIGNIFICANT CHANGE UP (ref 43–77)
NRBC # BLD: 0 /100 WBCS — SIGNIFICANT CHANGE UP (ref 0–0)
PLATELET # BLD AUTO: 311 K/UL — SIGNIFICANT CHANGE UP (ref 150–400)
POTASSIUM SERPL-MCNC: 4.3 MMOL/L — SIGNIFICANT CHANGE UP (ref 3.5–5.3)
POTASSIUM SERPL-SCNC: 4.3 MMOL/L — SIGNIFICANT CHANGE UP (ref 3.5–5.3)
PROT SERPL-MCNC: 7.3 G/DL — SIGNIFICANT CHANGE UP (ref 6–8.3)
PROTHROM AB SERPL-ACNC: 12.2 SEC — SIGNIFICANT CHANGE UP (ref 10.5–13.4)
RBC # BLD: 4.12 M/UL — SIGNIFICANT CHANGE UP (ref 3.8–5.2)
RBC # FLD: 14.1 % — SIGNIFICANT CHANGE UP (ref 10.3–14.5)
SODIUM SERPL-SCNC: 141 MMOL/L — SIGNIFICANT CHANGE UP (ref 135–145)
WBC # BLD: 8.23 K/UL — SIGNIFICANT CHANGE UP (ref 3.8–10.5)
WBC # FLD AUTO: 8.23 K/UL — SIGNIFICANT CHANGE UP (ref 3.8–10.5)

## 2022-05-26 PROCEDURE — 36415 COLL VENOUS BLD VENIPUNCTURE: CPT

## 2022-05-26 PROCEDURE — 85610 PROTHROMBIN TIME: CPT

## 2022-05-26 PROCEDURE — 99214 OFFICE O/P EST MOD 30 MIN: CPT

## 2022-05-26 PROCEDURE — 80053 COMPREHEN METABOLIC PANEL: CPT

## 2022-05-26 PROCEDURE — 85730 THROMBOPLASTIN TIME PARTIAL: CPT

## 2022-05-26 PROCEDURE — 85025 COMPLETE CBC W/AUTO DIFF WBC: CPT

## 2022-05-26 RX ORDER — AMLODIPINE BESYLATE 5 MG/1
5 TABLET ORAL
Qty: 30 | Refills: 0 | Status: DISCONTINUED | COMMUNITY
Start: 2017-11-05 | End: 2022-05-26

## 2022-05-26 RX ORDER — BUDESONIDE 1 MG/2ML
1 INHALANT ORAL
Qty: 120 | Refills: 3 | Status: DISCONTINUED | COMMUNITY
Start: 2018-03-09 | End: 2022-05-26

## 2022-05-26 RX ORDER — BENZONATATE 200 MG/1
200 CAPSULE ORAL 3 TIMES DAILY
Qty: 90 | Refills: 1 | Status: DISCONTINUED | COMMUNITY
Start: 2019-09-24 | End: 2022-05-26

## 2022-05-26 RX ORDER — HYDROCODONE BITARTRATE AND HOMATROPINE METHYLBROMIDE 5; 1.5 MG/5ML; MG/5ML
5-1.5 SYRUP ORAL
Qty: 800 | Refills: 0 | Status: DISCONTINUED | COMMUNITY
Start: 2019-09-09 | End: 2022-05-26

## 2022-05-27 RX ORDER — BUDESONIDE 1 MG/2ML
1 INHALANT ORAL
Qty: 2 | Refills: 1 | Status: DISCONTINUED | COMMUNITY
Start: 2018-01-09 | End: 2022-05-27

## 2022-05-27 RX ORDER — MONTELUKAST 10 MG/1
10 TABLET, FILM COATED ORAL
Qty: 90 | Refills: 1 | Status: DISCONTINUED | COMMUNITY
Start: 2020-01-10 | End: 2022-05-27

## 2022-05-27 RX ORDER — BUDESONIDE AND FORMOTEROL FUMARATE DIHYDRATE 160; 4.5 UG/1; UG/1
160-4.5 AEROSOL RESPIRATORY (INHALATION) TWICE DAILY
Qty: 3 | Refills: 1 | Status: DISCONTINUED | COMMUNITY
Start: 2017-06-20 | End: 2022-05-27

## 2022-05-27 RX ORDER — LISINOPRIL 5 MG/1
5 TABLET ORAL DAILY
Refills: 0 | Status: DISCONTINUED | COMMUNITY
End: 2022-05-27

## 2022-05-27 RX ORDER — LEVALBUTEROL HYDROCHLORIDE 0.31 MG/3ML
0.31 SOLUTION RESPIRATORY (INHALATION)
Qty: 3 | Refills: 1 | Status: DISCONTINUED | COMMUNITY
Start: 2017-06-23 | End: 2022-05-27

## 2022-05-27 RX ORDER — MELOXICAM 15 MG/1
15 TABLET ORAL
Qty: 30 | Refills: 0 | Status: DISCONTINUED | COMMUNITY
Start: 2018-05-15 | End: 2022-05-27

## 2022-05-27 RX ORDER — NEOMYCIN SULFATE, POLYMYXIN B SULFATE AND DEXAMETHASONE 3.5; 10000; 1 MG/ML; [USP'U]/ML; MG/ML
3.5-10000-0.1 SUSPENSION OPHTHALMIC
Qty: 5 | Refills: 0 | Status: DISCONTINUED | COMMUNITY
Start: 2018-02-09 | End: 2022-05-27

## 2022-05-27 RX ORDER — PREDNISONE 10 MG/1
10 TABLET ORAL
Qty: 50 | Refills: 0 | Status: DISCONTINUED | COMMUNITY
Start: 2022-02-16 | End: 2022-05-27

## 2022-05-27 RX ORDER — MONTELUKAST 10 MG/1
10 TABLET, FILM COATED ORAL
Qty: 30 | Refills: 2 | Status: DISCONTINUED | COMMUNITY
Start: 2018-07-09 | End: 2022-05-27

## 2022-05-27 RX ORDER — PROMETHAZINE HYDROCHLORIDE AND DEXTROMETHORPHAN HYDROBROMIDE ORAL SOLUTION 15; 6.25 MG/5ML; MG/5ML
6.25-15 SOLUTION ORAL
Qty: 480 | Refills: 1 | Status: DISCONTINUED | COMMUNITY
Start: 2019-09-24 | End: 2022-05-27

## 2022-05-27 RX ORDER — LEVALBUTEROL HYDROCHLORIDE 0.63 MG/3ML
0.63 SOLUTION RESPIRATORY (INHALATION)
Qty: 120 | Refills: 3 | Status: DISCONTINUED | COMMUNITY
Start: 2022-02-16 | End: 2022-05-27

## 2022-05-27 RX ORDER — TIOTROPIUM BROMIDE 18 UG/1
18 CAPSULE ORAL; RESPIRATORY (INHALATION)
Qty: 3 | Refills: 1 | Status: DISCONTINUED | COMMUNITY
Start: 2017-05-02 | End: 2022-05-27

## 2022-05-27 NOTE — PHYSICAL EXAM
[Neck Appearance] : the appearance of the neck was normal [Neck Cervical Mass (___cm)] : no neck mass was observed [Jugular Venous Distention Increased] : there was no jugular-venous distention [Thyroid Diffuse Enlargement] : the thyroid was not enlarged [Thyroid Nodule] : there were no palpable thyroid nodules [Exaggerated Use Of Accessory Muscles For Inspiration] : no accessory muscle use [Scattered Wheezes] : scattered wheezing [Bibasilar Rales/Crackles] : bibasilar rales

## 2022-05-27 NOTE — HISTORY OF PRESENT ILLNESS
[FreeTextEntry1] : 76 year old female with a PMH of diverticulosis, Nunez's esophagus, asthmatic bronchitis, GERD/LPRD, post nasal drip syndrome, tracheobronchomalacia s/p s/p bronchoscopy, RIGHT video assisted thorascopic surgery, robotic assisted tracheobronchoplasty with Prolene mesh done on 11/1/17 presents today for a follow up visit. \par Patient reports recurrent cough since Feb 2022. Cough is consistent, productive, yellow or white, mucous like or sticky lumps of phlegm. Took Amoxicillin x 7 days, course of prednisone, and Symbicort occasional, with no improvement. Pt reports her GERD symptoms have been stable with life style adjustment, daily pantoprazole, famotidine, and probiotics. She denies unintentional weight loss, anorexia, chest pain, or SOB on rest. \par \par CXR on 02/11/2022 unremarkable. \par

## 2022-05-27 NOTE — REVIEW OF SYSTEMS
[Wheezing] : wheezing [Cough] : cough [SOB on Exertion] : shortness of breath during exertion [Heartburn] : heartburn [Negative] : Psychiatric [Shortness Of Breath] : no shortness of breath [Abdominal Pain] : no abdominal pain [Vomiting] : no vomiting [Constipation] : no constipation [Diarrhea] : no diarrhea [Melena] : no melena

## 2022-06-09 ENCOUNTER — TRANSCRIPTION ENCOUNTER (OUTPATIENT)
Age: 77
End: 2022-06-09

## 2022-06-10 ENCOUNTER — OUTPATIENT (OUTPATIENT)
Dept: OUTPATIENT SERVICES | Facility: HOSPITAL | Age: 77
LOS: 1 days | Discharge: HOME | End: 2022-06-10
Payer: MEDICARE

## 2022-06-10 ENCOUNTER — APPOINTMENT (OUTPATIENT)
Dept: THORACIC SURGERY | Facility: HOSPITAL | Age: 77
End: 2022-06-10

## 2022-06-10 DIAGNOSIS — Z98.41 CATARACT EXTRACTION STATUS, RIGHT EYE: Chronic | ICD-10-CM

## 2022-06-10 DIAGNOSIS — Z98.890 OTHER SPECIFIED POSTPROCEDURAL STATES: Chronic | ICD-10-CM

## 2022-06-10 DIAGNOSIS — Z90.49 ACQUIRED ABSENCE OF OTHER SPECIFIED PARTS OF DIGESTIVE TRACT: Chronic | ICD-10-CM

## 2022-06-10 PROCEDURE — 43235 EGD DIAGNOSTIC BRUSH WASH: CPT

## 2022-06-10 PROCEDURE — 31622 DX BRONCHOSCOPE/WASH: CPT

## 2022-06-16 ENCOUNTER — APPOINTMENT (OUTPATIENT)
Dept: THORACIC SURGERY | Facility: CLINIC | Age: 77
End: 2022-06-16
Payer: MEDICARE

## 2022-06-16 DIAGNOSIS — Z98.890 OTHER SPECIFIED POSTPROCEDURAL STATES: ICD-10-CM

## 2022-06-16 DIAGNOSIS — K22.70 BARRETT'S ESOPHAGUS W/OUT DYSPLASIA: ICD-10-CM

## 2022-06-16 PROCEDURE — 99202 OFFICE O/P NEW SF 15 MIN: CPT | Mod: 95

## 2022-06-16 PROCEDURE — 99212 OFFICE O/P EST SF 10 MIN: CPT | Mod: 95

## 2022-06-16 NOTE — REASON FOR VISIT
[Follow-Up: _____] : a [unfilled] follow-up visit [Home] : at home, [unfilled] , at the time of the visit. [Medical Office: ([facility])___] : at the medical office located in

## 2022-06-16 NOTE — END OF VISIT
[FreeTextEntry3] : I, NURYS BRENNAN , am scribing for and in the presence of DHRUV STYLES the following sections: history of present illness, past medical/family/surgical/family/social history, review of systems, vital signs, physical exam, and disposition.\par \par I DHRUV STYLES, personally performed the service described in the documentation, reviewed the documentation recorded by the scribe in my presence and it accurately and completely records my words and actions. \par \par \par

## 2022-06-16 NOTE — HISTORY OF PRESENT ILLNESS
[FreeTextEntry1] : 76 year old female with a PMH of diverticulosis, Nunez's esophagus, asthmatic bronchitis, GERD/LPRD, post nasal drip syndrome, tracheobronchomalacia s/p s/p bronchoscopy, RIGHT video assisted thorascopic surgery, robotic assisted tracheobronchoplasty with Prolene mesh done on 11/1/17 presents today for a follow up visit. Patient reports recurrent cough since Feb 2022. She is now s/p Awake dynamic bronchoscopy and EGD on 6/10/22 and presents today for a follow up visit.\par \par CXR on 02/11/2022 unremarkable. \par \par EGD 6/10/22:\par The esophagus was examined, and the scope was advanced to 33 cm from the teeth where the findings of Nunez's esophagus and the Z-line were noted.  The diaphragmatic pinch was located  at 35 cm from the teeth.  The stomach was then entered and there were no rugal folds.  Retroflexion was performed, and there appeared to be approximately 1cm sliding hiatal hernia. There was erythema on the gastric mucosa around the GE junction and in the antrum.  \par \par Awake bronchoscopy 6/10/22:\par - The left mainstem bronchus and the distal  and mid trachea had approximately 50% narrowing of their lumen, and the right mainstem bronchus was approximately 70% collapsed and bronchus intermedius had approximately 90%-100% collapse on the airway lumen.

## 2022-06-16 NOTE — ASSESSMENT
[FreeTextEntry1] : 76 year old female with a PMH of diverticulosis, Nunez's esophagus, asthmatic bronchitis, GERD/LPRD, post nasal drip syndrome, tracheobronchomalacia s/p s/p bronchoscopy, RIGHT video assisted thorascopic surgery, robotic assisted tracheobronchoplasty with Prolene mesh done on 11/1/17 presents today for a follow up visit. Patient reports recurrent cough since Feb 2022. She is now s/p Awake dynamic bronchoscopy and EGD on 6/10/22 and presents today for a follow up visit.\par \par EGD 6/10/22:\par The esophagus was examined, and the scope was advanced to 33 cm from the teeth where the findings of Nunez's esophagus and the Z-line were noted.  The diaphragmatic pinch was located  at 35 cm from the teeth.  The stomach was then entered and there were no rugal folds.  Retroflexion was performed, and there appeared to be approximately 1cm sliding hiatal hernia. There was erythema on the gastric mucosa around the GE junction and in the antrum.  \par \par Awake bronchoscopy 6/10/22:\par - The left mainstem bronchus and the distal  and mid trachea had approximately 50% narrowing of their lumen, and the right mainstem bronchus was approximately 70% collapsed and bronchus intermedius had approximately 90%-100% collapse on the airway lumen.\par \par Above studies reviewed. Discussed that the only area of severe collapse seen was in the bronchus intermedius which would not be treated surgical. Discussed that reflux may be contributing to her cough. Explained that a surgical hiatal hernia repair could be done to decrease reflux and possibly help with her cough. Uncontrolled reflux can also weaken the previous TBM repair. Will discuss this case with Dr. Saunders. She should follow up with him as well. \par \par PLAN:\par 1. Will discuss with anti-reflux procedure and other treatment options with Dr. Saunders.

## 2022-06-20 ENCOUNTER — RX RENEWAL (OUTPATIENT)
Age: 77
End: 2022-06-20

## 2022-07-15 ENCOUNTER — APPOINTMENT (OUTPATIENT)
Dept: PULMONOLOGY | Facility: CLINIC | Age: 77
End: 2022-07-15

## 2022-07-15 ENCOUNTER — NON-APPOINTMENT (OUTPATIENT)
Age: 77
End: 2022-07-15

## 2022-07-15 VITALS
WEIGHT: 138 LBS | OXYGEN SATURATION: 97 % | HEIGHT: 63 IN | SYSTOLIC BLOOD PRESSURE: 136 MMHG | HEART RATE: 84 BPM | TEMPERATURE: 97.3 F | BODY MASS INDEX: 24.45 KG/M2 | RESPIRATION RATE: 16 BRPM | DIASTOLIC BLOOD PRESSURE: 70 MMHG

## 2022-07-15 DIAGNOSIS — R49.0 DYSPHONIA: ICD-10-CM

## 2022-07-15 PROCEDURE — 95012 NITRIC OXIDE EXP GAS DETER: CPT

## 2022-07-15 PROCEDURE — 99214 OFFICE O/P EST MOD 30 MIN: CPT | Mod: 25

## 2022-07-15 PROCEDURE — 94010 BREATHING CAPACITY TEST: CPT

## 2022-07-15 RX ORDER — LISINOPRIL 20 MG/1
20 TABLET ORAL
Qty: 30 | Refills: 0 | Status: ACTIVE | COMMUNITY
Start: 2022-02-25

## 2022-07-15 RX ORDER — LISINOPRIL 10 MG/1
10 TABLET ORAL
Qty: 60 | Refills: 0 | Status: ACTIVE | COMMUNITY
Start: 2022-03-25

## 2022-07-15 NOTE — PROCEDURE
[FreeTextEntry1] : FENO was 88; normal value being less than 25\par Fractional exhaled nitric oxide (FENO) is regarded as a simple, noninvasive method for assessing eosinophilic airway inflammation. Produced by a variety of cells within the lung, nitric oxide (NO) concentrations are generally low in healthy individuals. However, high concentrations of NO appear to be involved in nonspecific host defense mechanisms and chronic inflammatory diseases such as asthma. The American Thoracic Society (ATS) therefore has recommended using FENO to aid in the diagnosis and monitoring of eosinophilic airway inflammation and asthma, and for identifying steroid responsive individuals whose chronic respiratory symptoms may be airway inflammation. \par \par PFT reveals normal flows, with an FEV1 of  1.93 L, which is  98% of predicted, with normal flow volume loop

## 2022-07-15 NOTE — HISTORY OF PRESENT ILLNESS
[FreeTextEntry1] : Ms. Phipps is a 76 year old female with a history of allergic rhinitis, Nunez's esophagus, bronchoplasty/tracheoplasty, persistent cough, GERD, LPRD, SUSAN, TBM, and SOB presenting to the office today for a sick visit. Her chief complaint is \par -she notes she is generally well (improved from "very bad" in April and she was unable to get an appt at the time)\par -she saw Dr. Artis at [facility] who did a bronchoscopy who thought the major problem was related to reflux but the pt did not agree\par -she notes she thinks her cough is worse due to post nasal dripping\par -her GP gave her some saline solution for her cough which improved it slightly\par -she notes she saw an NP for her cough as well who thought the cough was from the sinuses\par -she notes she has trouble sleeping due to her cough\par -she wheezes when she lays down and after she coughs and changes her lying position, the wheezing improves\par -she is taking Baclofen\par \par \par -She denies any visual issues, headaches, nausea, vomiting, fever, chills, sweats, chest pains, chest pressure, diarrhea, constipation, dysphagia, myalgia, dizziness, leg swelling, leg pain, itchy eyes, itchy ears, heartburn, reflux, or sour taste in the mouth.

## 2022-07-15 NOTE — ASSESSMENT
[FreeTextEntry1] : Ms. Phipps is a 76 year old female with a history of recently diverticulosis, GERD, collagenous colitis,severe persistent asthma, allergies, TBM, OSAS who now comes ?PNA complicated by tracheomalacia. She is s/p tracheoplasty with bronchospasm. - s/p asthmatic type bronchitis and GERD. She is currently symptomatic from a pulmonary perspective; her number one issue is combo asthma/ allergy \par \par Her cough and shortness of breath is related to:\par -acquired tracheobronchomalacia \par -asthma  - Active\par -abnormal hypersensitivity panel \par -post nasal drip syndrome\par -GERD (Active)\par \par problem 1: severe persistent asthma-(Active)\par -continue to use Xopenex (.63) via nebulizer Q8H (in the morning)\par -continue to use Pulmicort 0.5 BID (after Xopenex)\par -followed by Spiriva 1 inhalation QD\par -continue to use Singulair 10 mg QHS\par -s/p Prednisone 20mg for 7 days then 10mg for 7 days (2/2022)\par -Information sheet given to the patient to be reviewed, this medication is never to be used without consulting the prescribing physician. Proper dietary restraint is necessary specifically salt containing foods, if any reaction may occur should be reported. \par -Asthma is believed to be caused by inherited (genetic) and environmental factor, but its exact cause is unknown. Asthma may be triggered by allergens, lung infections, or irritants in the air. Asthma triggers are different for each person\par -Inhaler technique reviewed as well as oral hygiene techniques reviewed with patient. Avoidance of cold air, extremes of temperature, rescue inhaler should be used before exercise. Order of medication reviewed with patient. Recommended use of a cool mist humidifier in the bedroom. \par \par problem 2A: Biologic eval\par - script given for blood work: asthma profile, food IgE panel, eosinophil level, IgE level, Vitamin D level \par -Rx pending bloodwork\par \par problem 3: sensory neuropathic cough- Active \par -off Elavil 25 mg up to TID- QHS- on the shelf \par -continue Baclofen 10mg pre-meal, PRN\par -continue Hycodan syrup - (ISTOP)\par -Add Neurontin 100 qHS \par -Sensory neuropathic cough is an etiology of cough that is often realized once someone has been ruled out for common disease such as: asthma, COPD, eosinophilic bronchitis, bronchiectasis, post nasal drip, and GERD. It sometimes develops following a URI, herpes zoster outbreak in pharynx or thyroid or cervical spine injury. However, many patients have no identifiable antecedent explanation. \par \par problem 4: tracheobronchomalacia\par -s/p surgery with Dr. Boyd Ramirez in 11/2017\par -s/p bronchoscopy with Dr. Artis\par Tracheomalacia is usually acquired in adults and common causes include damage by tracheostomy or endotracheal intubation damaging the tracheal cartilage with increase risk with multiple intubations, prolonged intubation, and concurrent high dose steroid therapy; external chest wall trauma and surgery; chronic compression of the trachea by benign etiologies (eg, benign mediastinal goiter) or malignancy; relapsing polychondritis; or recurrent infection. Tracheomalacia can be asymptomatic, however signs or symptoms can develop as the severity of the airway narrowing progresses with major symptoms include dyspnea, cough, and sputum retention. Other symptoms include severe paroxysms of coughing, wheezing or stridor, barking cough and may be exacerbated by forced expiration, cough, and Valsalva maneuver. Tracheomalacia is diagnosed by a bronchoscopic visualization of dynamic airway collapse on dynamic chest CT. Therapy is warranted in symptomatic patients with severe tracheomalacia and includes surgical repair as tracheobronchoplasty. The patient was referred to Dr. Artis/ Dr. Kelley, at [facility] for a surgical consult.\par \par problem 5: abnormal hypersensitivity panel/allergic panel\par -s/p allergist evaluation - no treatment recommended at this time\par -Environmental measures for allergies were encouraged including mattress and pillow cover, air purifier, and environmental controls. \par \par Problem 6: s/p mycoplasma pneumonia \par -s/p prolonged Zithromax for anti inflammatory\par \par problem 7: overweight (resolved)\par -Weight loss, exercise, and diet control were discussed and are highly encouraged. Treatment options were given such as, aqua therapy, and contacting a nutritionist. Recommended to use the elliptical, stationary bike, less use of treadmill. Obesity is associated with worsening asthma, shortness of breath, and potential for cardiac disease, diabetes, and other underlying medical conditions.\par \par problem 8: GERD/LPR (quiet)\par -continue Baclofen 10mg pre-meal - noncompliant\par -continue to use Protonix 40 mg before breakfast\par -continue Pepcid 40 mg QHS\par -recommended [employer] Probiotic supplement \par -Recommended to chew on DGL (diglyceride licorice root) before breakfast and dinner \par -Recommended to take Slippery Elm or drink Slippery Elm Tea (Throat Coat Tea) \par \par -Rule of 2s: avoid eating too much, eating too late, eating too spicy, eating two hours before bed\par -Things to avoid including overeating, spicy foods, tight clothing, eating within three hours of bed, this list is not all inclusive. \par -For treatment of reflux, possible options discussed including diet control, H2 blockers, PPIs, as well as coating motility agents discussed as treatment options. Timing of meals and proximity of last meal to sleep were discussed. If symptoms persist, a formal gastrointestinal evaluation is needed. \par \par problem 9: dysphonia\par -recommended to use Fisherman[employer] Friend Lozenges\par -felt to be related to cough causing vocal cord trauma, LPR, medication effect\par -recommended hydration and time \par \par problem 10: allergic rhinitis\par -recommended xlear saline \par -recommended OTC antihistamine PRN (Claritin 10 mg QAM)\par -recommended Allergist evaluation \par -Environmental measures for allergies were encouraged including mattress and pillow cover, air purifier, and environmental controls.\par \par problem 11: SUSAN\par -continue Rozerem 8mg qhs\par -recommended to use "Chin Strap" \par -recommended to take nocturnal medications earlier; OxyAid\par -recommended to have a mouth piece made - denied; failed CPAP\par -based on her Nunez's esophagus, EDS, ? snoring, elevated mallampati class, and poor sleep; she is being set up for an at home sleep study \par \par Sleep apnea is associated with adverse clinical consequences which an affect most organ systems. Cardiovascular disease risk includes arrhythmias, atrial fibrillation, hypertension, coronary artery disease, and stroke. Metabolic disorders include diabetes type 2, non-alcoholic fatty liver disease. Mood disorder especially depression; and cognitive decline especially in the elderly. Associations with chronic reflux/Nunez’s esophagus some but not all inclusive. \par -Reasons include arousal consistent with hypopnea; respiratory events most prominent in REM sleep or supine position; therefore sleep staging and body position are important for accurate diagnosis and estimation of AHI.\par -Good sleep hygiene was encouraged including avoiding watching television an hour before bed, keeping caffeine at a low, avoiding reading, television, or anything, in bed, no drinking any liquids three hours before bedtime, and only getting into bed when tired and ready for sleep. \par \par Problem 12: Health Maintenance/COVID19 Precautions \par -VACCINE HESITANT \par -Covid 19 precaution, vaccine and booster discussed at length and recommended \par -educated patient on COVID 19 vaccine and appropriate recommendations (hesitant due to allergies) \par - Clean your hands often. Wash your hands often with soap and water for at least 20 seconds, especially after blowing your nose, coughing, or sneezing, or having been in a public place.\par - If soap and water are not available, use a hand  that contains at least 60% alcohol.\par - To the extent possible, avoid touching high-touch surfaces in public places - elevator buttons, door handles, handrails, handshaking with people, etc. Use a tissue or your sleeve to cover your hand or finger if you must touch something.\par - Wash your hands after touching surfaces in public places.\par - Avoid touching your face, nose, eyes, etc.\par - Clean and disinfect your home to remove germs: practice routine cleaning of frequently touched surfaces (for example: tables, doorknobs, light switches, handles, desks, toilets, faucets, sinks & cell phones)\par - Avoid crowds, especially in poorly ventilated spaces. Your risk of exposure to respiratory viruses like COVID-19 may increase in crowded, closed-in settings with little air circulation if there are people in the crowd who are sick. All patients are recommended to practice social distancing and stay at least 6 feet away from others. \par - Avoid all non-essential travel including plane trips, and especially avoid embarking on cruise ships.\par -If COVID-19 is spreading in your community, take extra measures to put distance between yourself and other people to further reduce your risk of being exposed to this new virus.\par -Stay home as much as possible.\par - Consider ways of getting food brought to your house through family, social, or commercial networks\par -Be aware that the virus has been known to live in the air up to 3 hours post exposure, cardboard up to 24 hours post exposure, copper up to 4 hours post exposure, steel and plastic up to 2-3 days post exposure. Risk of transmission from these surfaces are affected by many variables.\par COVID-19 precautionary Immune Support Recommendations:\par -OTC Vitamin C 500mg BID \par -OTC Quercetin 250-500mg BID \par -OTC Zinc 75-100mg per day \par -OTC Melatonin 1 or 2mg a night \par -OTC Vitamin D 1-4000mg per day \par -OTC Tonic Water 8oz per day\par -Water 0.5-1 gallon per day\par Asthma and COVID19:\par You need to make sure your asthma is under control. This often requires the use of inhaled corticosteroids (and sometimes oral corticosteroids). Inhaled corticosteroids do not likely reduce your immune system’s ability to fight infections, but oral corticosteroids may. It is important to use the steps above to protect yourself to limit your exposure to any respiratory virus. \par \par problem 13: health maintenance \par -recommended a yearly flu shot after October 15 2018 (refused 2020)\par -recommended strep pneumonia vaccines: Prevnar-13 vaccine, followed by Pneumo vaccine 23 on year following (refused)\par -recommended early intervention for URIs\par -recommended osteoporosis evaluations\par -recommended early dermatological evaluations\par -recommended after the age of 50 to the age of 70, colonoscopy every 5 years\par \par F/U in 3-4 months\par She is encouraged to call with any changes, concerns, or questions.

## 2022-07-15 NOTE — REASON FOR VISIT
[Acute] : an acute visit [FreeTextEntry1] : severe persistent asthma, allergic rhinitis, Nunez's esophagus, bronchoplasty/tracheoplasty, persistent cough, GERD, LPRD, SUSAN, TBM, and SOB

## 2022-07-15 NOTE — PHYSICAL EXAM
[No Acute Distress] : no acute distress [Normal Oropharynx] : normal oropharynx [III] : Mallampati Class: III [Normal Appearance] : normal appearance [No Neck Mass] : no neck mass [Normal Rate/Rhythm] : normal rate/rhythm [Normal S1, S2] : normal s1, s2 [No Murmurs] : no murmurs [No Resp Distress] : no resp distress [Clear to Auscultation Bilaterally] : clear to auscultation bilaterally [No Abnormalities] : no abnormalities [Benign] : benign [Normal Gait] : normal gait [No Clubbing] : no clubbing [No Cyanosis] : no cyanosis [No Edema] : no edema [FROM] : FROM [Normal Color/ Pigmentation] : normal color/ pigmentation [No Focal Deficits] : no focal deficits [Oriented x3] : oriented x3 [Normal Affect] : normal affect [TextBox_2] : ow [TextBox_68] : I:E 1:3, clear

## 2022-07-17 ENCOUNTER — LABORATORY RESULT (OUTPATIENT)
Age: 77
End: 2022-07-17

## 2022-07-18 LAB
24R-OH-CALCIDIOL SERPL-MCNC: 96 PG/ML
25(OH)D3 SERPL-MCNC: 161 NG/ML
BASOPHILS # BLD AUTO: 0.03 K/UL
BASOPHILS NFR BLD AUTO: 0.5 %
EOSINOPHIL # BLD AUTO: 0.33 K/UL
EOSINOPHIL NFR BLD AUTO: 5 %
HCT VFR BLD CALC: 39.5 %
HGB BLD-MCNC: 13.2 G/DL
IMM GRANULOCYTES NFR BLD AUTO: 0.2 %
LYMPHOCYTES # BLD AUTO: 2.93 K/UL
LYMPHOCYTES NFR BLD AUTO: 44 %
MAN DIFF?: NORMAL
MCHC RBC-ENTMCNC: 32 PG
MCHC RBC-ENTMCNC: 33.4 GM/DL
MCV RBC AUTO: 95.9 FL
MONOCYTES # BLD AUTO: 0.59 K/UL
MONOCYTES NFR BLD AUTO: 8.9 %
NEUTROPHILS # BLD AUTO: 2.77 K/UL
NEUTROPHILS NFR BLD AUTO: 41.4 %
PLATELET # BLD AUTO: 297 K/UL
RBC # BLD: 4.12 M/UL
RBC # FLD: 13.9 %
WBC # FLD AUTO: 6.66 K/UL

## 2022-07-22 LAB
A ALTERNATA IGE QN: <0.1 KUA/L
A FUMIGATUS IGE QN: <0.1 KUA/L
C ALBICANS IGE QN: <0.1 KUA/L
C HERBARUM IGE QN: <0.1 KUA/L
CAT DANDER IGE QN: <0.1 KUA/L
COMMON RAGWEED IGE QN: <0.1 KUA/L
D FARINAE IGE QN: <0.1 KUA/L
D PTERONYSS IGE QN: <0.1 KUA/L
DEPRECATED A ALTERNATA IGE RAST QL: 0
DEPRECATED A FUMIGATUS IGE RAST QL: 0
DEPRECATED C ALBICANS IGE RAST QL: 0
DEPRECATED C HERBARUM IGE RAST QL: 0
DEPRECATED CAT DANDER IGE RAST QL: 0
DEPRECATED COMMON RAGWEED IGE RAST QL: 0
DEPRECATED D FARINAE IGE RAST QL: 0
DEPRECATED D PTERONYSS IGE RAST QL: 0
DEPRECATED DOG DANDER IGE RAST QL: 0
DEPRECATED DUCK FEATHER IGE RAST QL: 0
DEPRECATED GOOSE FEATHER IGE RAST QL: 0
DEPRECATED M RACEMOSUS IGE RAST QL: 0
DEPRECATED ROACH IGE RAST QL: 0
DEPRECATED TIMOTHY IGE RAST QL: 0
DEPRECATED WHITE OAK IGE RAST QL: 0
DOG DANDER IGE QN: <0.1 KUA/L
DUCK FEATHER IGE QN: <0.1 KUA/L
GOOSE FEATHER IGE QN: <0.1 KUA/L
M RACEMOSUS IGE QN: <0.1 KUA/L
ROACH IGE QN: <0.1 KUA/L
TIMOTHY IGE QN: <0.1 KUA/L
TOTAL IGE SMQN RAST: 29 KU/L
WHITE OAK IGE QN: <0.1 KUA/L

## 2022-07-23 LAB
A ALTERNATA IGE QN: <0.1 KUA/L
A FUMIGATUS IGE QN: <0.1 KUA/L
BERMUDA GRASS IGE QN: <0.1 KUA/L
BOXELDER IGE QN: <0.1 KUA/L
C HERBARUM IGE QN: <0.1 KUA/L
CALIF WALNUT IGE QN: <0.1 KUA/L
CAT DANDER IGE QN: <0.1 KUA/L
CLAM IGE QN: <0.1 KUA/L
CMN PIGWEED IGE QN: <0.1 KUA/L
CODFISH IGE QN: <0.1 KUA/L
COMMON RAGWEED IGE QN: <0.1 KUA/L
CORN IGE QN: <0.1 KUA/L
COTTONWOOD IGE QN: <0.1 KUA/L
COW MILK IGE QN: <0.1 KUA/L
D FARINAE IGE QN: <0.1 KUA/L
D PTERONYSS IGE QN: <0.1 KUA/L
DEPRECATED A ALTERNATA IGE RAST QL: 0
DEPRECATED A FUMIGATUS IGE RAST QL: 0
DEPRECATED BERMUDA GRASS IGE RAST QL: 0
DEPRECATED BOXELDER IGE RAST QL: 0
DEPRECATED C HERBARUM IGE RAST QL: 0
DEPRECATED CAT DANDER IGE RAST QL: 0
DEPRECATED CLAM IGE RAST QL: 0
DEPRECATED CODFISH IGE RAST QL: 0
DEPRECATED COMMON PIGWEED IGE RAST QL: 0
DEPRECATED COMMON RAGWEED IGE RAST QL: 0
DEPRECATED CORN IGE RAST QL: 0
DEPRECATED COTTONWOOD IGE RAST QL: 0
DEPRECATED COW MILK IGE RAST QL: 0
DEPRECATED D FARINAE IGE RAST QL: 0
DEPRECATED D PTERONYSS IGE RAST QL: 0
DEPRECATED DOG DANDER IGE RAST QL: 0
DEPRECATED EGG WHITE IGE RAST QL: 0
DEPRECATED GOOSEFOOT IGE RAST QL: 0
DEPRECATED LONDON PLANE IGE RAST QL: 0
DEPRECATED MOUSE URINE PROT IGE RAST QL: 0
DEPRECATED MUGWORT IGE RAST QL: 0
DEPRECATED P NOTATUM IGE RAST QL: 0
DEPRECATED PEANUT IGE RAST QL: 0
DEPRECATED RED CEDAR IGE RAST QL: 0
DEPRECATED ROACH IGE RAST QL: 0
DEPRECATED SCALLOP IGE RAST QL: 0.2 KUA/L
DEPRECATED SESAME SEED IGE RAST QL: 0
DEPRECATED SHEEP SORREL IGE RAST QL: 0
DEPRECATED SHRIMP IGE RAST QL: 0
DEPRECATED SILVER BIRCH IGE RAST QL: 0
DEPRECATED SOYBEAN IGE RAST QL: 0
DEPRECATED TIMOTHY IGE RAST QL: 0
DEPRECATED WALNUT IGE RAST QL: 0
DEPRECATED WHEAT IGE RAST QL: 0
DEPRECATED WHITE ASH IGE RAST QL: 0
DEPRECATED WHITE OAK IGE RAST QL: 0
DOG DANDER IGE QN: <0.1 KUA/L
EGG WHITE IGE QN: <0.1 KUA/L
GOOSEFOOT IGE QN: <0.1 KUA/L
LONDON PLANE IGE QN: <0.1 KUA/L
MOUSE URINE PROT IGE QN: <0.1 KUA/L
MUGWORT IGE QN: <0.1 KUA/L
MULBERRY (T70) CLASS: 0
MULBERRY (T70) CONC: <0.1 KUA/L
P NOTATUM IGE QN: <0.1 KUA/L
PEANUT IGE QN: <0.1 KUA/L
RED CEDAR IGE QN: <0.1 KUA/L
ROACH IGE QN: <0.1 KUA/L
SCALLOP IGE QN: <0.1 KUA/L
SCALLOP IGE QN: NORMAL
SESAME SEED IGE QN: <0.1 KUA/L
SHEEP SORREL IGE QN: <0.1 KUA/L
SILVER BIRCH IGE QN: <0.1 KUA/L
SOYBEAN IGE QN: <0.1 KUA/L
TIMOTHY IGE QN: <0.1 KUA/L
TREE ALLERG MIX1 IGE QL: 0
WALNUT IGE QN: <0.1 KUA/L
WHEAT IGE QN: <0.1 KUA/L
WHITE ASH IGE QN: <0.1 KUA/L
WHITE ELM IGE QN: 0
WHITE ELM IGE QN: <0.1 KUA/L
WHITE OAK IGE QN: <0.1 KUA/L

## 2022-07-25 ENCOUNTER — NON-APPOINTMENT (OUTPATIENT)
Age: 77
End: 2022-07-25

## 2022-11-29 ENCOUNTER — NON-APPOINTMENT (OUTPATIENT)
Age: 77
End: 2022-11-29

## 2022-11-29 ENCOUNTER — APPOINTMENT (OUTPATIENT)
Dept: PULMONOLOGY | Facility: CLINIC | Age: 77
End: 2022-11-29

## 2022-11-29 VITALS
HEART RATE: 64 BPM | BODY MASS INDEX: 23.27 KG/M2 | TEMPERATURE: 97.6 F | HEIGHT: 63.5 IN | WEIGHT: 133 LBS | SYSTOLIC BLOOD PRESSURE: 128 MMHG | RESPIRATION RATE: 16 BRPM | OXYGEN SATURATION: 94 % | DIASTOLIC BLOOD PRESSURE: 74 MMHG

## 2022-11-29 PROCEDURE — 95012 NITRIC OXIDE EXP GAS DETER: CPT

## 2022-11-29 PROCEDURE — 99214 OFFICE O/P EST MOD 30 MIN: CPT | Mod: CS,25

## 2022-11-29 PROCEDURE — 94010 BREATHING CAPACITY TEST: CPT

## 2022-11-29 RX ORDER — BACLOFEN 10 MG/1
10 TABLET ORAL
Qty: 270 | Refills: 1 | Status: ACTIVE | COMMUNITY
Start: 2019-04-12 | End: 1900-01-01

## 2022-11-29 RX ORDER — FAMOTIDINE 40 MG/1
40 TABLET, FILM COATED ORAL
Qty: 180 | Refills: 1 | Status: ACTIVE | COMMUNITY
Start: 2020-01-10 | End: 1900-01-01

## 2022-11-29 NOTE — ADDENDUM
[FreeTextEntry1] : Documented by Henry Dyer acting as a scribe for Dr. Denzel Saundres on 11/29/2022.\par \par All medical record entries made by the Scribe were at my, Dr. Denzel Saunders's, direction and personally dictated by me on 11/29/2022. I have reviewed the chart and agree that the record accurately reflects my personal performance of the history, physical exam, assessment and plan. I have also personally directed, reviewed, and agree with the discharge instructions.

## 2022-11-29 NOTE — PROCEDURE
[FreeTextEntry1] : Feno was 89; a normal value being less than 25. Fractional exhaled nitric oxide (FENO) is regarded as a simple, noninvasive method for assessing eosinophilic airway inflammation. Produced by a variety of cells within the lung, nitric oxide (NO) concentrations are generally low in healthy individuals. However, high concentrations of NO appear to be involved in nonspecific host defense mechanisms and chronic inflammatory  diseases such as asthma. The American Thoracic Society (ATS) therefore recommended using FENO to aid in the diagnosis and monitoring of eosinophilic airway inflammation and asthma, and for identifying steroid responsive individuals whose chronic respiratory symptoms may be caused by airway inflammation \par \par PFT revealed mild restrictive dysfunction, with a FEV1 of 1.47L, which is 76% of predicted, with an abnormal inspiratory limb

## 2022-11-29 NOTE — ASSESSMENT
[FreeTextEntry1] : Ms. Phipps is a 77 year old female with a history of recently diverticulosis, GERD, collagenous colitis,severe persistent asthma, allergies, TBM, OSAS who now comes ?PNA complicated by tracheomalacia. She is s/p tracheoplasty with bronchospasm. - s/p asthmatic type bronchitis and GERD. She is currently symptomatic from a pulmonary perspective; her number one issue is combo asthma/ allergy s/p COVID-19 9/2022 - NC with Rx\par \par Her cough and shortness of breath is related to:\par -acquired tracheobronchomalacia \par -asthma  - Active\par -abnormal hypersensitivity panel \par -post nasal drip syndrome\par -GERD (Active)\par \par problem 1: severe persistent asthma-(Active) - NC with Rx\par -Add Breztri 2 puffs BID \par -continue to use Xopenex (.63) via nebulizer Q8H (in the morning)\par -continue to use Pulmicort 0.5 BID (after Xopenex)\par -followed by Spiriva 1 inhalation QD\par -continue to use Singulair 10 mg QHS\par -s/p Prednisone 20mg for 7 days then 10mg for 7 days (2/2022) - NC; repeat now\par -Information sheet given to the patient to be reviewed, this medication is never to be used without consulting the prescribing physician. Proper dietary restraint is necessary specifically salt containing foods, if any reaction may occur should be reported. \par -Asthma is believed to be caused by inherited (genetic) and environmental factor, but its exact cause is unknown. Asthma may be triggered by allergens, lung infections, or irritants in the air. Asthma triggers are different for each person\par -Inhaler technique reviewed as well as oral hygiene techniques reviewed with patient. Avoidance of cold air, extremes of temperature, rescue inhaler should be used before exercise. Order of medication reviewed with patient. Recommended use of a cool mist humidifier in the bedroom. \par \par problem 2A: Biologic eval\par - script given for blood work: asthma profile (-), food IgE panel (-), eosinophil level (330), IgE level (-), Vitamin D level (WNL)\par -Rx pending bloodwork\par \par Problem 2B: Eosinophilic Asthma\par -set up Dupixent\par -The safety and efficacy of Nucala was established in three double-blind , randomized, placebo controlled trials in patients with severe asthma. Compared to a placebo, patients with severe asthma receiving Nucala had a fever exacerbation requiring hospitalization and.or emergency department visits, and a longer time to first exacerbation. In addition, patients with severe asthma receiving Nucala or Fasenra experienced greater reductions in their daily maintenance oral corticosteroid dose,m while maintaining asthma control compared with patients receiving placebo. Treatment with Nucala did not result in a significant improvement in lung function as measured by the volume of air exhaled by patients in one second. The most common side effects include: headache, injection site reactions back pain, weakness and fatigue; hypersensitivity reactions can occur within hours or days including swelling of the face, mouth and tongue, fainting, dizziness, hives, breathing problems and rash; herpes zoster infections have occurred. The drug is a monoclonal antibody that inhibits interleukin-5 which helps regular eosinophils, a type of white blood cell that contributes to asthma. The over-prodcution of eosinophils can cause inflammation in the lungs, increasing the frequency of asthma attacks. Patients must also take other medications, including high dose inhaled corticosteroids and at least one additional asthma drug. \par \par problem 3: sensory neuropathic cough- Active \par -off Elavil 25 mg up to TID- QHS- on the shelf \par -continue Baclofen 10mg pre-meal, PRN\par -continue Hycodan syrup - (ISTOP)\par -Add Neurontin 100 qHS \par -Sensory neuropathic cough is an etiology of cough that is often realized once someone has been ruled out for common disease such as: asthma, COPD, eosinophilic bronchitis, bronchiectasis, post nasal drip, and GERD. It sometimes develops following a URI, herpes zoster outbreak in pharynx or thyroid or cervical spine injury. However, many patients have no identifiable antecedent explanation. \par \par problem 4: tracheobronchomalacia\par -s/p surgery with Dr. Boyd Ramirez in 11/2017\par -s/p bronchoscopy with Dr. Artis\par Tracheomalacia is usually acquired in adults and common causes include damage by tracheostomy or endotracheal intubation damaging the tracheal cartilage with increase risk with multiple intubations, prolonged intubation, and concurrent high dose steroid therapy; external chest wall trauma and surgery; chronic compression of the trachea by benign etiologies (eg, benign mediastinal goiter) or malignancy; relapsing polychondritis; or recurrent infection. Tracheomalacia can be asymptomatic, however signs or symptoms can develop as the severity of the airway narrowing progresses with major symptoms include dyspnea, cough, and sputum retention. Other symptoms include severe paroxysms of coughing, wheezing or stridor, barking cough and may be exacerbated by forced expiration, cough, and Valsalva maneuver. Tracheomalacia is diagnosed by a bronchoscopic visualization of dynamic airway collapse on dynamic chest CT. Therapy is warranted in symptomatic patients with severe tracheomalacia and includes surgical repair as tracheobronchoplasty. The patient was referred to Dr. Artis/ Dr. Kelley, at [facility] for a surgical consult.\par \par problem 5: abnormal hypersensitivity panel/allergic panel\par -s/p allergist evaluation - no treatment recommended at this time\par -Environmental measures for allergies were encouraged including mattress and pillow cover, air purifier, and environmental controls. \par \par Problem 6: s/p mycoplasma pneumonia \par -s/p prolonged Zithromax for anti inflammatory\par \par problem 7: overweight (resolved)\par -Weight loss, exercise, and diet control were discussed and are highly encouraged. Treatment options were given such as, aqua therapy, and contacting a nutritionist. Recommended to use the elliptical, stationary bike, less use of treadmill. Obesity is associated with worsening asthma, shortness of breath, and potential for cardiac disease, diabetes, and other underlying medical conditions.\par \par problem 8: GERD/LPR (quiet)\par -continue Baclofen 10mg pre-meal - noncompliant\par -continue to use Protonix 40 mg before breakfast\par -continue Pepcid 40 mg QHS\par -recommended [employer] Probiotic supplement \par -Recommended to chew on DGL (diglyceride licorice root) before breakfast and dinner \par -Recommended to take Slippery Elm or drink Slippery Elm Tea (Throat Coat Tea) \par \par -Rule of 2s: avoid eating too much, eating too late, eating too spicy, eating two hours before bed\par -Things to avoid including overeating, spicy foods, tight clothing, eating within three hours of bed, this list is not all inclusive. \par -For treatment of reflux, possible options discussed including diet control, H2 blockers, PPIs, as well as coating motility agents discussed as treatment options. Timing of meals and proximity of last meal to sleep were discussed. If symptoms persist, a formal gastrointestinal evaluation is needed. \par \par problem 9: dysphonia\par -recommended to use Fisherman's Friend Lozenges\par -felt to be related to cough causing vocal cord trauma, LPR, medication effect\par -recommended hydration and time \par \par problem 10: allergic rhinitis\par -recommended xlear saline \par -recommended OTC antihistamine PRN (Claritin 10 mg QAM)\par -recommended Allergist evaluation \par -Environmental measures for allergies were encouraged including mattress and pillow cover, air purifier, and environmental controls.\par \par problem 11: SUSAN\par -continue Rozerem 8mg qhs\par -recommended to use "Chin Strap" \par -recommended to take nocturnal medications earlier; OxyAid\par -recommended to have a mouth piece made - denied; failed CPAP\par -based on her Nunez's esophagus, EDS, ? snoring, elevated mallampati class, and poor sleep; she is being set up for an at home sleep study \par \par Sleep apnea is associated with adverse clinical consequences which an affect most organ systems. Cardiovascular disease risk includes arrhythmias, atrial fibrillation, hypertension, coronary artery disease, and stroke. Metabolic disorders include diabetes type 2, non-alcoholic fatty liver disease. Mood disorder especially depression; and cognitive decline especially in the elderly. Associations with chronic reflux/Nunez’s esophagus some but not all inclusive. \par -Reasons include arousal consistent with hypopnea; respiratory events most prominent in REM sleep or supine position; therefore sleep staging and body position are important for accurate diagnosis and estimation of AHI.\par -Good sleep hygiene was encouraged including avoiding watching television an hour before bed, keeping caffeine at a low, avoiding reading, television, or anything, in bed, no drinking any liquids three hours before bedtime, and only getting into bed when tired and ready for sleep. \par \par Problem 12: Health Maintenance/COVID19 Precautions \par -VACCINE HESITANT \par -Covid 19 precaution, vaccine and booster discussed at length and recommended \par -educated patient on COVID 19 vaccine and appropriate recommendations (hesitant due to allergies) \par - Clean your hands often. Wash your hands often with soap and water for at least 20 seconds, especially after blowing your nose, coughing, or sneezing, or having been in a public place.\par - If soap and water are not available, use a hand  that contains at least 60% alcohol.\par - To the extent possible, avoid touching high-touch surfaces in public places - elevator buttons, door handles, handrails, handshaking with people, etc. Use a tissue or your sleeve to cover your hand or finger if you must touch something.\par - Wash your hands after touching surfaces in public places.\par - Avoid touching your face, nose, eyes, etc.\par - Clean and disinfect your home to remove germs: practice routine cleaning of frequently touched surfaces (for example: tables, doorknobs, light switches, handles, desks, toilets, faucets, sinks & cell phones)\par - Avoid crowds, especially in poorly ventilated spaces. Your risk of exposure to respiratory viruses like COVID-19 may increase in crowded, closed-in settings with little air circulation if there are people in the crowd who are sick. All patients are recommended to practice social distancing and stay at least 6 feet away from others. \par - Avoid all non-essential travel including plane trips, and especially avoid embarking on cruise ships.\par -If COVID-19 is spreading in your community, take extra measures to put distance between yourself and other people to further reduce your risk of being exposed to this new virus.\par -Stay home as much as possible.\par - Consider ways of getting food brought to your house through family, social, or commercial networks\par -Be aware that the virus has been known to live in the air up to 3 hours post exposure, cardboard up to 24 hours post exposure, copper up to 4 hours post exposure, steel and plastic up to 2-3 days post exposure. Risk of transmission from these surfaces are affected by many variables.\par COVID-19 precautionary Immune Support Recommendations:\par -OTC Vitamin C 500mg BID \par -OTC Quercetin 250-500mg BID \par -OTC Zinc 75-100mg per day \par -OTC Melatonin 1 or 2mg a night \par -OTC Vitamin D 1-4000mg per day \par -OTC Tonic Water 8oz per day\par -Water 0.5-1 gallon per day\par Asthma and COVID19:\par You need to make sure your asthma is under control. This often requires the use of inhaled corticosteroids (and sometimes oral corticosteroids). Inhaled corticosteroids do not likely reduce your immune system’s ability to fight infections, but oral corticosteroids may. It is important to use the steps above to protect yourself to limit your exposure to any respiratory virus. \par \par problem 13: health maintenance \par -recommended a yearly flu shot after October 15 2018 (refused 2020)\par -recommended strep pneumonia vaccines: Prevnar-13 vaccine, followed by Pneumo vaccine 23 on year following (refused)\par -recommended early intervention for URIs\par -recommended osteoporosis evaluations\par -recommended early dermatological evaluations\par -recommended after the age of 50 to the age of 70, colonoscopy every 5 years\par \par F/U in 3-4 months\par She is encouraged to call with any changes, concerns, or questions.

## 2022-11-29 NOTE — HISTORY OF PRESENT ILLNESS
[FreeTextEntry1] : Ms. Phipps is a 77 year old female with a history of allergic rhinitis, Nunez's esophagus, bronchoplasty/tracheoplasty, persistent cough, GERD, LPRD, SUSAN, TBM, and SOB presenting to the office today for a sick visit. Her chief complaint is coughing and poor sleep\par \par -she notes a cough\par -s/p COVID-19 \par -she notes that he had high blood pressure, fever, and cough wit COVID-19\par -she notes taking antibiotics, vitamins, and other Rx for COVID\par -she notes she is wheezing, which started in the last week\par -she notes that she stops wheezing when she coughs up mucus\par -she notes her cough is productive of white or green mucus\par -she notes PND is worsening\par -she notes heartburn and reflux yesterday and today but otherwise not usually\par -she notes her weight is stable at the moment but she lost weight during COVID\par -she notes her cough is so severe is makes her feel like vomiting\par -she notes that she is not sleeping well (interrupted 3-5 times per night)\par -she notes that she is not taking inhalers or nebulizers\par -she notes that she was non compliant with the previously prescribed course of steroids\par \par \par \par -patient denies any headaches, nausea, chills, sweats, chest pain, chest pressure, palpitations, diarrhea, constipation, dysphagia, myalgias, dizziness, leg swelling, leg pain, itchy eyes, itchy ears

## 2022-11-29 NOTE — PHYSICAL EXAM
[No Acute Distress] : no acute distress [Normal Oropharynx] : normal oropharynx [Normal Appearance] : normal appearance [No Neck Mass] : no neck mass [Normal Rate/Rhythm] : normal rate/rhythm [Normal S1, S2] : normal s1, s2 [No Murmurs] : no murmurs [No Resp Distress] : no resp distress [Clear to Auscultation Bilaterally] : clear to auscultation bilaterally [No Abnormalities] : no abnormalities [Benign] : benign [Normal Gait] : normal gait [No Clubbing] : no clubbing [No Cyanosis] : no cyanosis [No Edema] : no edema [FROM] : FROM [Normal Color/ Pigmentation] : normal color/ pigmentation [No Focal Deficits] : no focal deficits [Oriented x3] : oriented x3 [Normal Affect] : normal affect [II] : Mallampati Class: II [TextBox_[id number]] : I:E 1:3, mild expiratory wheezes bilaterally

## 2022-12-01 ENCOUNTER — RESULT REVIEW (OUTPATIENT)
Age: 77
End: 2022-12-01

## 2022-12-01 ENCOUNTER — APPOINTMENT (OUTPATIENT)
Dept: RADIOLOGY | Facility: CLINIC | Age: 77
End: 2022-12-01

## 2022-12-01 ENCOUNTER — APPOINTMENT (OUTPATIENT)
Dept: GASTROENTEROLOGY | Facility: CLINIC | Age: 77
End: 2022-12-01

## 2022-12-01 ENCOUNTER — OUTPATIENT (OUTPATIENT)
Dept: OUTPATIENT SERVICES | Facility: HOSPITAL | Age: 77
LOS: 1 days | End: 2022-12-01
Payer: MEDICARE

## 2022-12-01 VITALS
BODY MASS INDEX: 23.27 KG/M2 | TEMPERATURE: 98.7 F | HEART RATE: 98 BPM | WEIGHT: 133 LBS | OXYGEN SATURATION: 98 % | HEIGHT: 63.5 IN | DIASTOLIC BLOOD PRESSURE: 60 MMHG | SYSTOLIC BLOOD PRESSURE: 155 MMHG

## 2022-12-01 DIAGNOSIS — R14.0 ABDOMINAL DISTENSION (GASEOUS): ICD-10-CM

## 2022-12-01 DIAGNOSIS — Z90.49 ACQUIRED ABSENCE OF OTHER SPECIFIED PARTS OF DIGESTIVE TRACT: Chronic | ICD-10-CM

## 2022-12-01 DIAGNOSIS — Z98.890 OTHER SPECIFIED POSTPROCEDURAL STATES: Chronic | ICD-10-CM

## 2022-12-01 DIAGNOSIS — Z98.41 CATARACT EXTRACTION STATUS, RIGHT EYE: Chronic | ICD-10-CM

## 2022-12-01 PROCEDURE — 74018 RADEX ABDOMEN 1 VIEW: CPT

## 2022-12-01 PROCEDURE — 99214 OFFICE O/P EST MOD 30 MIN: CPT

## 2022-12-01 PROCEDURE — 74018 RADEX ABDOMEN 1 VIEW: CPT | Mod: 26

## 2022-12-01 NOTE — HISTORY OF PRESENT ILLNESS
[FreeTextEntry1] : 77 yrs old female with chronic GERD and EGJ outflow obstruction who presented for a follow up. Patient was last seen in our office in 2020. Patient reported that she followed up with a pulmonologist who completed bronchoscopy and endoscopy for persistent cough, which was completed in 6/2022 showed 1 cm hiatal hernia and gastritis at the GE junction and the antrum but no evidence of stomach or esophageal mass. \par \par Patient reported since she came back from her trip in Sept, she has been having significant abdominal bloating and increased frequency passng flatus multiple times a day. She was diagnosed with COVID-19 at that time but did not require any hospitalization. The bloating sensation is relieved with passing flatus or burping. Denied changes in BMs, has one formed stool per day with no blood or mucus. Reported weight loss 8 lbs over last 2 weeks. No nausea or vomiting. Denied abd pain or cramping. She reports she changed her diet to plain rice few weeks ago which relieved her symptoms of loose stool. She only drinks small amounts of dairy products. \par \par In terms of her heartburn, it is being relieved with pantoprazole and she continues to take it every morning. She also uses famotidine as needed which provides relief.  [de-identified] : 6/2022 by pulm: No report present, but description of findings in the note. Had gastritis in the antrum and GE junction and hiatal hernia\par 2018: Dr. Clements, no hiatal hernia, was found to have EGJ outflow obstruction.

## 2022-12-01 NOTE — PHYSICAL EXAM
[Alert] : alert [Normal Voice/Communication] : normal voice/communication [Healthy Appearing] : healthy appearing [Sclera] : the sclera and conjunctiva were normal [Hearing Threshold Finger Rub Not [place]] : hearing was normal [Normal Appearance] : the appearance of the neck was normal [No Respiratory Distress] : no respiratory distress [No Acc Muscle Use] : no accessory muscle use [Heart Rate And Rhythm] : heart rate was normal and rhythm regular [Murmurs] : no murmurs [Bowel Sounds] : normal bowel sounds [Abdomen Tenderness] : non-tender [No Masses] : no abdominal mass palpated [Abdomen Soft] : soft [Abnormal Walk] : normal gait [Normal Color / Pigmentation] : normal skin color and pigmentation [] : no rash [No Focal Deficits] : no focal deficits [Oriented To Time, Place, And Person] : oriented to person, place, and time

## 2022-12-01 NOTE — ASSESSMENT
[FreeTextEntry1] : 77 yrs old female with history of chronic GERD and EGJ outflow obstruction who presented for a follow up, complained of abdominal bloating\par \par #Abdominal bloating\par Likely related to functional dyspepsia vs. possible lactose intolerance. Might be related to post-viral symptoms due to recent COVID infection. Recommended the patient to avoid all dairy products. Patient recently had an upper endoscopy which showed hiatal hernia but no other concerning masses/malignancy. So will hold off on repeat EGD for now. Recommended her to take FD guard supplement. Also discussed about diet modification. \par \par #Chronic GERD \par - continue with pantoprazole 40mg daily with famotidine as needed. \par \par Recommendations: \par - FD Gaurd for symptom control\par - pantoprazole daily. \par - famotidine as needed. \par \par This case was discussed with Dr. Babcock who agrees with the above assessment and plan.

## 2022-12-01 NOTE — ADDENDUM
[FreeTextEntry1] : Attending addendum\par Prominent complaints of bloating, both upper and lower abdominal\par Eructation and passage of flatus\par Some early satiety, though recent upper endoscopy noted from June 2022\par Complaints began following an infection with COVID as noted\par Functional issues,\par Recommend FD guard\par Continued use of simethicone (largely unsuccessful)\par Lactose avoidance (patient insists that she has little to no lactose containing foods)\par Abdominal x-ray rule out fecal overload\par Telephone follow-up

## 2022-12-06 ENCOUNTER — NON-APPOINTMENT (OUTPATIENT)
Age: 77
End: 2022-12-06

## 2022-12-07 RX ORDER — BUDESONIDE, GLYCOPYRROLATE, AND FORMOTEROL FUMARATE 160; 9; 4.8 UG/1; UG/1; UG/1
160-9-4.8 AEROSOL, METERED RESPIRATORY (INHALATION)
Qty: 1 | Refills: 5 | Status: DISCONTINUED | COMMUNITY
Start: 2022-11-29 | End: 2022-12-07

## 2022-12-09 ENCOUNTER — APPOINTMENT (OUTPATIENT)
Dept: CT IMAGING | Facility: CLINIC | Age: 77
End: 2022-12-09

## 2022-12-09 ENCOUNTER — OUTPATIENT (OUTPATIENT)
Dept: OUTPATIENT SERVICES | Facility: HOSPITAL | Age: 77
LOS: 1 days | End: 2022-12-09
Payer: MEDICARE

## 2022-12-09 DIAGNOSIS — Z98.41 CATARACT EXTRACTION STATUS, RIGHT EYE: Chronic | ICD-10-CM

## 2022-12-09 DIAGNOSIS — Z98.890 OTHER SPECIFIED POSTPROCEDURAL STATES: Chronic | ICD-10-CM

## 2022-12-09 DIAGNOSIS — R10.9 UNSPECIFIED ABDOMINAL PAIN: ICD-10-CM

## 2022-12-09 DIAGNOSIS — Z90.49 ACQUIRED ABSENCE OF OTHER SPECIFIED PARTS OF DIGESTIVE TRACT: Chronic | ICD-10-CM

## 2022-12-09 PROCEDURE — 74177 CT ABD & PELVIS W/CONTRAST: CPT | Mod: 26,MH

## 2022-12-09 PROCEDURE — 74177 CT ABD & PELVIS W/CONTRAST: CPT

## 2022-12-20 ENCOUNTER — APPOINTMENT (OUTPATIENT)
Dept: GASTROENTEROLOGY | Facility: HOSPITAL | Age: 77
End: 2022-12-20

## 2023-01-06 LAB — SARS-COV-2 N GENE NPH QL NAA+PROBE: NOT DETECTED

## 2023-01-09 ENCOUNTER — APPOINTMENT (OUTPATIENT)
Dept: GASTROENTEROLOGY | Facility: HOSPITAL | Age: 78
End: 2023-01-09

## 2023-01-09 ENCOUNTER — OUTPATIENT (OUTPATIENT)
Dept: OUTPATIENT SERVICES | Facility: HOSPITAL | Age: 78
LOS: 1 days | End: 2023-01-09
Payer: MEDICARE

## 2023-01-09 ENCOUNTER — RESULT REVIEW (OUTPATIENT)
Age: 78
End: 2023-01-09

## 2023-01-09 ENCOUNTER — TRANSCRIPTION ENCOUNTER (OUTPATIENT)
Age: 78
End: 2023-01-09

## 2023-01-09 VITALS
HEIGHT: 63 IN | SYSTOLIC BLOOD PRESSURE: 151 MMHG | DIASTOLIC BLOOD PRESSURE: 70 MMHG | HEART RATE: 70 BPM | RESPIRATION RATE: 20 BRPM | WEIGHT: 138.01 LBS | OXYGEN SATURATION: 99 % | TEMPERATURE: 98 F

## 2023-01-09 VITALS
RESPIRATION RATE: 16 BRPM | HEART RATE: 64 BPM | DIASTOLIC BLOOD PRESSURE: 77 MMHG | SYSTOLIC BLOOD PRESSURE: 160 MMHG | OXYGEN SATURATION: 98 %

## 2023-01-09 DIAGNOSIS — R10.9 UNSPECIFIED ABDOMINAL PAIN: ICD-10-CM

## 2023-01-09 DIAGNOSIS — Z98.41 CATARACT EXTRACTION STATUS, RIGHT EYE: Chronic | ICD-10-CM

## 2023-01-09 DIAGNOSIS — Z98.890 OTHER SPECIFIED POSTPROCEDURAL STATES: Chronic | ICD-10-CM

## 2023-01-09 DIAGNOSIS — Z90.49 ACQUIRED ABSENCE OF OTHER SPECIFIED PARTS OF DIGESTIVE TRACT: Chronic | ICD-10-CM

## 2023-01-09 PROCEDURE — 43239 EGD BIOPSY SINGLE/MULTIPLE: CPT

## 2023-01-09 PROCEDURE — 88305 TISSUE EXAM BY PATHOLOGIST: CPT | Mod: 26

## 2023-01-09 PROCEDURE — 88305 TISSUE EXAM BY PATHOLOGIST: CPT

## 2023-01-09 RX ORDER — SODIUM CHLORIDE 9 MG/ML
500 INJECTION INTRAMUSCULAR; INTRAVENOUS; SUBCUTANEOUS
Refills: 0 | Status: COMPLETED | OUTPATIENT
Start: 2023-01-09 | End: 2023-01-09

## 2023-01-09 RX ADMIN — SODIUM CHLORIDE 30 MILLILITER(S): 9 INJECTION INTRAMUSCULAR; INTRAVENOUS; SUBCUTANEOUS at 14:20

## 2023-01-09 NOTE — ASU DISCHARGE PLAN (ADULT/PEDIATRIC) - NS MD DC FALL RISK RISK
For information on Fall & Injury Prevention, visit: [email or web address] OR  [email or web address] OR  https://www.cdc.gov/steadi/patient.html

## 2023-01-09 NOTE — ASU PATIENT PROFILE, ADULT - FALL HARM RISK - UNIVERSAL INTERVENTIONS
Bed in lowest position, wheels locked, appropriate side rails in place/Call bell, personal items and telephone in reach/Instruct patient to call for assistance before getting out of bed or chair/Non-slip footwear when patient is out of bed/[place] to call system/Physically safe environment - no spills, clutter or unnecessary equipment/Purposeful Proactive Rounding/Room/bathroom lighting operational, light cord in reach

## 2023-01-09 NOTE — PRE PROCEDURE NOTE - PRE PROCEDURE EVALUATION
Attending Physician:        Andriy Babcock MD                    Procedure:    Indication for Procedure: abdominal pain  ________________________________________________________  PAST MEDICAL & SURGICAL HISTORY:  Hypertension      Ulcer      Colitis      Asthma      Hiatal hernia      Nephrolithiasis  2006      GERD (gastroesophageal reflux disease)      HLD (hyperlipidemia)      H/O gastric ulcer      Sleep apnea      Tracheobronchomalacia      History of appendectomy      History of cataract surgery, right      History of surgery  tracheobronchoplasty 11/2017        ALLERGIES:  Breo Ellipta (Other)  quinidine (Unknown)    HOME MEDICATIONS:    AICD/PPM: [ ] yes   [x ] no    PERTINENT LAB DATA:                      PHYSICAL EXAMINATION:    T(C): --  HR: --  BP: --  RR: --  SpO2: --    Constitutional: NAD  HEENT: PERRLA, EOMI,    Neck:  No JVD  Respiratory: CTAB/L  Cardiovascular: S1 and S2  Gastrointestinal: BS+, soft, NT/ND  Extremities: No peripheral edema  Neurological: A/O x 3, no focal deficits  Psychiatric: Normal mood, normal affect  Skin: No rashes    ASA Class: I [ ]  II [x ]  III [ ]  IV [ ]    COMMENTS:    The patient is a suitable candidate for the planned procedure unless box checked [ ]  No, explain:

## 2023-01-09 NOTE — ASU PATIENT PROFILE, ADULT - NSICDXPASTMEDICALHX_GEN_ALL_CORE_FT
PAST MEDICAL HISTORY:  Asthma     Colitis     COVID-19     GERD (gastroesophageal reflux disease)     H/O gastric ulcer     Hiatal hernia     HLD (hyperlipidemia)     Hypertension     Nephrolithiasis 2006    Sleep apnea     Tracheobronchomalacia     Ulcer

## 2023-01-11 LAB — SURGICAL PATHOLOGY STUDY: SIGNIFICANT CHANGE UP

## 2023-01-13 ENCOUNTER — NON-APPOINTMENT (OUTPATIENT)
Age: 78
End: 2023-01-13

## 2023-01-17 PROBLEM — U07.1 COVID-19: Chronic | Status: ACTIVE | Noted: 2023-01-09

## 2023-01-17 RX ORDER — CICLESONIDE 160 UG/1
160 AEROSOL, METERED RESPIRATORY (INHALATION) TWICE DAILY
Qty: 1 | Refills: 3 | Status: ACTIVE | COMMUNITY
Start: 2022-12-07 | End: 1900-01-01

## 2023-01-17 RX ORDER — GLYCOPYRROLATE AND FORMOTEROL FUMARATE 9; 4.8 UG/1; UG/1
9-4.8 AEROSOL, METERED RESPIRATORY (INHALATION)
Qty: 1 | Refills: 3 | Status: DISCONTINUED | COMMUNITY
Start: 2022-12-07 | End: 2023-01-17

## 2023-01-19 ENCOUNTER — APPOINTMENT (OUTPATIENT)
Dept: PULMONOLOGY | Facility: CLINIC | Age: 78
End: 2023-01-19
Payer: MEDICARE

## 2023-01-19 VITALS
TEMPERATURE: 97.6 F | BODY MASS INDEX: 23.97 KG/M2 | WEIGHT: 137 LBS | OXYGEN SATURATION: 98 % | HEART RATE: 80 BPM | HEIGHT: 63.5 IN | RESPIRATION RATE: 16 BRPM | SYSTOLIC BLOOD PRESSURE: 130 MMHG | DIASTOLIC BLOOD PRESSURE: 78 MMHG

## 2023-01-19 PROCEDURE — 71046 X-RAY EXAM CHEST 2 VIEWS: CPT

## 2023-01-19 PROCEDURE — 99214 OFFICE O/P EST MOD 30 MIN: CPT | Mod: CS,25

## 2023-01-19 PROCEDURE — 95012 NITRIC OXIDE EXP GAS DETER: CPT

## 2023-01-19 RX ORDER — PREDNISONE 10 MG/1
10 TABLET ORAL
Qty: 50 | Refills: 0 | Status: ACTIVE | COMMUNITY
Start: 2023-01-19 | End: 1900-01-01

## 2023-01-19 RX ORDER — TIOTROPIUM BROMIDE AND OLODATEROL 3.124; 2.736 UG/1; UG/1
2.5-2.5 SPRAY, METERED RESPIRATORY (INHALATION) DAILY
Qty: 3 | Refills: 1 | Status: ACTIVE | COMMUNITY
Start: 2023-01-19 | End: 1900-01-01

## 2023-01-26 NOTE — HISTORY OF PRESENT ILLNESS
[FreeTextEntry1] : Ms. Phipps is a 77 year old female with a history of allergic rhinitis, Nunez's esophagus, bronchoplasty/tracheoplasty, persistent cough, GERD, LPRD, SUSAN, TBM, and SOB presenting to the office today for a sick visit. Her chief complaint is coughing and poor sleep\par \par -she notes condition initially improved with Rx but declined due to cold air \par -she notes persistent coughing\par -she notes intermittent wheezing\par -she notes unable to tolerate Breztri due to dyspnea \par -she notes bowels are regular \par -she notes vision is stable \par -she notes dysphonia due to persistent coughing and wheezing \par -she noes now on Baclofen, OTC muscle relaxers, Albuterol, and budesonide \par \par -she denies any headaches, nausea, emesis, fever, chills, sweats, chest pain, chest pressure, palpitations, constipation, diarrhea, vertigo, dysphagia, heartburn, reflux, itchy eyes, itchy ears, leg swelling, leg pain, arthralgias, myalgias, or sour taste in the mouth.

## 2023-01-26 NOTE — ASSESSMENT
[FreeTextEntry1] : Ms. Phipps is a 77 year old female with a history of recently diverticulosis, GERD, collagenous colitis,severe persistent asthma, allergies, TBM, OSAS who now comes ?PNA complicated by tracheomalacia. She is s/p tracheoplasty with bronchospasm. - s/p asthmatic type bronchitis and GERD. She is currently symptomatic from a pulmonary perspective; still asthma/ allergy/ LPR\par \par Her cough and shortness of breath is related to:\par -acquired tracheobronchomalacia \par -asthma  - Active\par -abnormal hypersensitivity panel \par -post nasal drip syndrome\par -GERD (Active)\par \par problem 1: severe persistent asthma-(Active) - NC with Rx- active \par -add Prednisone 30 mg x 5 days, 20 mg x 5 days, 10 mg x 5 days \par -s/p Prednisone 20mg for 7 days then 10mg for 7 days (2/2022) -11/2022\par -Information sheet given to the patient to be reviewed, this medication is never to be used without consulting the prescribing physician. Proper dietary restraint is necessary specifically salt containing foods, if any reaction may occur should be reported. \par \par -off Breztri 2 puffs BID (unable)\par -add Stiolto 2 puffs QD \par -continue to use Xopenex (.63) via nebulizer Q8H (in the morning)\par -continue to use Pulmicort 0.5 BID (after Xopenex)\par -continue to use Singulair 10 mg QHS\par -Asthma is believed to be caused by inherited (genetic) and environmental factor, but its exact cause is unknown. Asthma may be triggered by allergens, lung infections, or irritants in the air. Asthma triggers are different for each person\par -Inhaler technique reviewed as well as oral hygiene techniques reviewed with patient. Avoidance of cold air, extremes of temperature, rescue inhaler should be used before exercise. Order of medication reviewed with patient. Recommended use of a cool mist humidifier in the bedroom. \par \par problem 2A: Biologic eval\par - script given for blood work: asthma profile (-), food IgE panel (-), eosinophil level (330), IgE level (-), Vitamin D level (WNL)\par -Rx pending bloodwork\par \par Problem 2B: Eosinophilic Asthma  - 7/17/2022\par -set up Dupixent (11/2022, 1/2023) \par -The safety and efficacy of Nucala was established in three double-blind , randomized, placebo controlled trials in patients with severe asthma. Compared to a placebo, patients with severe asthma receiving Nucala had a fever exacerbation requiring hospitalization and.or emergency department visits, and a longer time to first exacerbation. In addition, patients with severe asthma receiving Nucala or Fasenra experienced greater reductions in their daily maintenance oral corticosteroid dose,m while maintaining asthma control compared with patients receiving placebo. Treatment with Nucala did not result in a significant improvement in lung function as measured by the volume of air exhaled by patients in one second. The most common side effects include: headache, injection site reactions back pain, weakness and fatigue; hypersensitivity reactions can occur within hours or days including swelling of the face, mouth and tongue, fainting, dizziness, hives, breathing problems and rash; herpes zoster infections have occurred. The drug is a monoclonal antibody that inhibits interleukin-5 which helps regular eosinophils, a type of white blood cell that contributes to asthma. The over-prodcution of eosinophils can cause inflammation in the lungs, increasing the frequency of asthma attacks. Patients must also take other medications, including high dose inhaled corticosteroids and at least one additional asthma drug. \par \par problem 3: sensory neuropathic cough- Active \par -off Elavil 25 mg up to TID- QHS- on the shelf \par -continue Baclofen 10mg pre-meal, PRN\par -continue Hycodan syrup - (ISTOP)\par -Add Neurontin 100 Q8H\par -Sensory neuropathic cough is an etiology of cough that is often realized once someone has been ruled out for common disease such as: asthma, COPD, eosinophilic bronchitis, bronchiectasis, post nasal drip, and GERD. It sometimes develops following a URI, herpes zoster outbreak in pharynx or thyroid or cervical spine injury. However, many patients have no identifiable antecedent explanation. \par \par problem 4: tracheobronchomalacia\par -s/p surgery with Dr. Boyd Ramirez in 11/2017\par -s/p bronchoscopy with Dr. Artis\par Tracheomalacia is usually acquired in adults and common causes include damage by tracheostomy or endotracheal intubation damaging the tracheal cartilage with increase risk with multiple intubations, prolonged intubation, and concurrent high dose steroid therapy; external chest wall trauma and surgery; chronic compression of the trachea by benign etiologies (eg, benign mediastinal goiter) or malignancy; relapsing polychondritis; or recurrent infection. Tracheomalacia can be asymptomatic, however signs or symptoms can develop as the severity of the airway narrowing progresses with major symptoms include dyspnea, cough, and sputum retention. Other symptoms include severe paroxysms of coughing, wheezing or stridor, barking cough and may be exacerbated by forced expiration, cough, and Valsalva maneuver. Tracheomalacia is diagnosed by a bronchoscopic visualization of dynamic airway collapse on dynamic chest CT. Therapy is warranted in symptomatic patients with severe tracheomalacia and includes surgical repair as tracheobronchoplasty. The patient was referred to Dr. Artis/ Dr. Kelley, at [facility] for a surgical consult.\par \par problem 5: abnormal hypersensitivity panel/allergic panel\par -s/p allergist evaluation - no treatment recommended at this time\par -Environmental measures for allergies were encouraged including mattress and pillow cover, air purifier, and environmental controls. \par \par Problem 6: s/p mycoplasma pneumonia \par -s/p prolonged Zithromax for anti inflammatory\par \par problem 7: overweight (resolved)\par -Weight loss, exercise, and diet control were discussed and are highly encouraged. Treatment options were given such as, aqua therapy, and contacting a nutritionist. Recommended to use the elliptical, stationary bike, less use of treadmill. Obesity is associated with worsening asthma, shortness of breath, and potential for cardiac disease, diabetes, and other underlying medical conditions.\par \par problem 8: GERD/LPR - active \par -continue Baclofen 10mg pre-meal - noncompliant\par -continue to use Protonix 40 mg before breakfast\par -continue Pepcid 40 mg QHS\par -recommended [employer] Probiotic supplement \par -Recommended to chew on DGL (diglyceride licorice root) before breakfast and dinner \par -Recommended to take Slippery Elm or drink Slippery Elm Tea (Throat Coat Tea) \par \par -Rule of 2s: avoid eating too much, eating too late, eating too spicy, eating two hours before bed\par -Things to avoid including overeating, spicy foods, tight clothing, eating within three hours of bed, this list is not all inclusive. \par -For treatment of reflux, possible options discussed including diet control, H2 blockers, PPIs, as well as coating motility agents discussed as treatment options. Timing of meals and proximity of last meal to sleep were discussed. If symptoms persist, a formal gastrointestinal evaluation is needed. \par \par problem 9: dysphonia\par -recommended to use Fisherman's Friend Lozenges\par -felt to be related to cough causing vocal cord trauma, LPR, medication effect\par -recommended hydration and time \par \par problem 10: allergic rhinitis\par -recommended xlear saline \par -recommended OTC antihistamine PRN (Claritin 10 mg QAM)\par -recommended Allergist evaluation \par -Environmental measures for allergies were encouraged including mattress and pillow cover, air purifier, and environmental controls.\par \par problem 11: SUSAN\par -continue Rozerem 8mg qhs\par -recommended to use "Chin Strap" \par -recommended to take nocturnal medications earlier; OxyAid\par -recommended to have a mouth piece made - denied; failed CPAP\par -based on her Nunez's esophagus, EDS, ? snoring, elevated mallampati class, and poor sleep; she is being set up for an at home sleep study \par \par Sleep apnea is associated with adverse clinical consequences which an affect most organ systems. Cardiovascular disease risk includes arrhythmias, atrial fibrillation, hypertension, coronary artery disease, and stroke. Metabolic disorders include diabetes type 2, non-alcoholic fatty liver disease. Mood disorder especially depression; and cognitive decline especially in the elderly. Associations with chronic reflux/Nunez’s esophagus some but not all inclusive. \par -Reasons include arousal consistent with hypopnea; respiratory events most prominent in REM sleep or supine position; therefore sleep staging and body position are important for accurate diagnosis and estimation of AHI.\par -Good sleep hygiene was encouraged including avoiding watching television an hour before bed, keeping caffeine at a low, avoiding reading, television, or anything, in bed, no drinking any liquids three hours before bedtime, and only getting into bed when tired and ready for sleep. \par \par Problem 12: Health Maintenance/COVID19 Precautions \par -VACCINE HESITANT \par -Covid 19 precaution, vaccine and booster discussed at length and recommended \par -educated patient on COVID 19 vaccine and appropriate recommendations (hesitant due to allergies) \par - Clean your hands often. Wash your hands often with soap and water for at least 20 seconds, especially after blowing your nose, coughing, or sneezing, or having been in a public place.\par - If soap and water are not available, use a hand  that contains at least 60% alcohol.\par - To the extent possible, avoid touching high-touch surfaces in public places - elevator buttons, door handles, handrails, handshaking with people, etc. Use a tissue or your sleeve to cover your hand or finger if you must touch something.\par - Wash your hands after touching surfaces in public places.\par - Avoid touching your face, nose, eyes, etc.\par - Clean and disinfect your home to remove germs: practice routine cleaning of frequently touched surfaces (for example: tables, doorknobs, light switches, handles, desks, toilets, faucets, sinks & cell phones)\par - Avoid crowds, especially in poorly ventilated spaces. Your risk of exposure to respiratory viruses like COVID-19 may increase in crowded, closed-in settings with little air circulation if there are people in the crowd who are sick. All patients are recommended to practice social distancing and stay at least 6 feet away from others. \par - Avoid all non-essential travel including plane trips, and especially avoid embarking on cruise ships.\par -If COVID-19 is spreading in your community, take extra measures to put distance between yourself and other people to further reduce your risk of being exposed to this new virus.\par -Stay home as much as possible.\par - Consider ways of getting food brought to your house through family, social, or commercial networks\par -Be aware that the virus has been known to live in the air up to 3 hours post exposure, cardboard up to 24 hours post exposure, copper up to 4 hours post exposure, steel and plastic up to 2-3 days post exposure. Risk of transmission from these surfaces are affected by many variables.\par COVID-19 precautionary Immune Support Recommendations:\par -OTC Vitamin C 500mg BID \par -OTC Quercetin 250-500mg BID \par -OTC Zinc 75-100mg per day \par -OTC Melatonin 1 or 2mg a night \par -OTC Vitamin D 1-4000mg per day \par -OTC Tonic Water 8oz per day\par -Water 0.5-1 gallon per day\par Asthma and COVID19:\par You need to make sure your asthma is under control. This often requires the use of inhaled corticosteroids (and sometimes oral corticosteroids). Inhaled corticosteroids do not likely reduce your immune system’s ability to fight infections, but oral corticosteroids may. It is important to use the steps above to protect yourself to limit your exposure to any respiratory virus. \par \par problem 13: health maintenance \par -recommended a yearly flu shot after October 15 2018 (refused 2020)\par -recommended strep pneumonia vaccines: Prevnar-13 vaccine, followed by Pneumo vaccine 23 on year following (refused)\par -recommended early intervention for URIs\par -recommended osteoporosis evaluations\par -recommended early dermatological evaluations\par -recommended after the age of 50 to the age of 70, colonoscopy every 5 years\par \par F/U in 3-4 months\par She is encouraged to call with any changes, concerns, or questions.

## 2023-01-26 NOTE — PROCEDURE
[FreeTextEntry1] : CXR reveals a normal sized heart; no evidence of infiltrate or effusion--a normal appearing chest radiograph \par \par FENO was 68; a normal value being less than 25\par Fractional exhaled nitric oxide (FENO) is regarded as a simple, noninvasive method for assessing eosinophilic airway inflammation. Produced by a variety of cells within the lung, nitric oxide (NO) concentrations are generally low in healthy individuals. However, high concentrations of NO appear to be involved in nonspecific host defense mechanisms and chronic inflammatory diseases such as asthma. The American Thoracic Society (ATS) therefore has recommended using FENO to aid in the diagnosis and monitoring of eosinophilic airway inflammation and asthma, and for identifying steroid responsive individuals whose chronic respiratory symptoms may be caused by airway inflammation.

## 2023-01-26 NOTE — ADDENDUM
[FreeTextEntry1] : Documented by Baldemar Hernandes acting as a scribe for Dr. Denzel Saunders on 01/19/2023 .\par \par All medical record entries made by the Scribe were at my, Dr. Denzel Saunders's, direction and personally dictated by me on 01/19/2023. I have reviewed the chart and agree that the record accurately reflects my personal performance of the history, physical exam, assessment and plan. I have also personally directed, reviewed, and agree with the discharge instructions.

## 2023-01-26 NOTE — PHYSICAL EXAM

## 2023-03-31 ENCOUNTER — APPOINTMENT (OUTPATIENT)
Dept: PULMONOLOGY | Facility: CLINIC | Age: 78
End: 2023-03-31
Payer: MEDICARE

## 2023-03-31 VITALS
DIASTOLIC BLOOD PRESSURE: 60 MMHG | HEIGHT: 62 IN | TEMPERATURE: 97.4 F | HEART RATE: 83 BPM | SYSTOLIC BLOOD PRESSURE: 130 MMHG | OXYGEN SATURATION: 97 % | BODY MASS INDEX: 25.4 KG/M2 | WEIGHT: 138 LBS | RESPIRATION RATE: 16 BRPM

## 2023-03-31 DIAGNOSIS — U07.1 COVID-19: ICD-10-CM

## 2023-03-31 PROCEDURE — 94727 GAS DIL/WSHOT DETER LNG VOL: CPT

## 2023-03-31 PROCEDURE — 94010 BREATHING CAPACITY TEST: CPT

## 2023-03-31 PROCEDURE — 95012 NITRIC OXIDE EXP GAS DETER: CPT

## 2023-03-31 PROCEDURE — 94729 DIFFUSING CAPACITY: CPT

## 2023-03-31 PROCEDURE — 99214 OFFICE O/P EST MOD 30 MIN: CPT | Mod: CS,25

## 2023-03-31 RX ORDER — PREDNISONE 10 MG/1
10 TABLET ORAL
Qty: 50 | Refills: 0 | Status: DISCONTINUED | COMMUNITY
Start: 2022-11-29 | End: 2023-03-31

## 2023-03-31 RX ORDER — HYDROXYZINE HYDROCHLORIDE 25 MG/1
25 TABLET ORAL
Qty: 90 | Refills: 1 | Status: ACTIVE | COMMUNITY
Start: 2023-03-31 | End: 1900-01-01

## 2023-03-31 NOTE — HISTORY OF PRESENT ILLNESS
[FreeTextEntry1] : Ms. Phipps is a 77 year old female with a history of allergic rhinitis, Nunez's esophagus, bronchoplasty/tracheoplasty, persistent cough, GERD, LPRD, SUSAN, TBM, and SOB presenting to the office today for a f/p pulmonary evaluation. Her chief complaint is \par \par -she notes condition is significantly improved\par -she notes constipation\par -she notes mild intermittent cough\par -she notes poor quality of sleep \par -she notes GERD is controlled\par \par -she denies any headaches, nausea, emesis, fever, chills, sweats, chest pain, chest pressure, wheezing, palpitations, constipation, diarrhea, vertigo, dysphagia, heartburn, reflux, itchy eyes, itchy ears, leg swelling, arthralgias, myalgias, or sour taste in the mouth.

## 2023-03-31 NOTE — PROCEDURE
[FreeTextEntry1] : Full PFT reveals normal flows; FEV1 was  2.37L which is 123% of predicted; normal lung volumes; normal diffusion at 15.5, which is 89% of predicted; normal flow volume loop. \par \par FENO was 83; a normal value being less than 25\par Fractional exhaled nitric oxide (FENO) is regarded as a simple, noninvasive method for assessing eosinophilic airway inflammation. Produced by a variety of cells within the lung, nitric oxide (NO) concentrations are generally low in healthy individuals. However, high concentrations of NO appear to be involved in nonspecific host defense mechanisms and chronic inflammatory diseases such as asthma. The American Thoracic Society (ATS) therefore has recommended using FENO to aid in the diagnosis and monitoring of eosinophilic airway inflammation and asthma, and for identifying steroid responsive individuals whose chronic respiratory symptoms may be caused by airway inflammation.

## 2023-03-31 NOTE — ADDENDUM
[FreeTextEntry1] : Documented by ALISSON De León acting as a scribe for Dr. Denzel Saunders on 03/31/2023 .\par \par All medical record entries made by the Scribe were at my, Dr. Denzel Saunders's, direction and personally dictated by me on 03/31/2023. I have reviewed the chart and agree that the record accurately reflects my personal performance of the history, physical exam, assessment and plan. I have also personally directed, reviewed, and agree with the discharge instructions.

## 2023-03-31 NOTE — REASON FOR VISIT
[Follow-Up] : a follow-up visit [FreeTextEntry1] : severe persistent asthma, allergic rhinitis, Nunez's esophagus, bronchoplasty/tracheoplasty, persistent cough, GERD, LPRD, SUSAN, TBM, and SOB

## 2023-03-31 NOTE — ASSESSMENT
[FreeTextEntry1] : Ms. Phipps is a 77 year old female with a history of recently diverticulosis, GERD, collagenous colitis,severe persistent asthma, allergies, TBM, OSAS who now comes ?PNA complicated by tracheomalacia. She is s/p tracheoplasty with bronchospasm. - s/p asthmatic type bronchitis and GERD. She is currently less symptomatic from a pulmonary perspective; still asthma/ allergy/ LPR (resolved)\par \par Her cough and shortness of breath is related to:\par -acquired tracheobronchomalacia \par -asthma  - Active\par -abnormal hypersensitivity panel \par -post nasal drip syndrome\par -GERD (Active)\par \par problem 1: severe persistent asthma-(Active) - NC with Rx- semi compliant \par -add Prednisone 30 mg x 5 days, 20 mg x 5 days, 10 mg x 5 days \par -s/p Prednisone 20mg for 7 days then 10mg for 7 days (2/2022) -11/2022\par -Information sheet given to the patient to be reviewed, this medication is never to be used without consulting the prescribing physician. Proper dietary restraint is necessary specifically salt containing foods, if any reaction may occur should be reported. \par \par -add Stiolto 2 puffs QD \par -continue to use Xopenex (.63) via nebulizer Q8H (in the morning)\par -continue to use Pulmicort 0.5 BID (after Xopenex)\par -continue to use Singulair 10 mg QHS\par -Asthma is believed to be caused by inherited (genetic) and environmental factor, but its exact cause is unknown. Asthma may be triggered by allergens, lung infections, or irritants in the air. Asthma triggers are different for each person\par -Inhaler technique reviewed as well as oral hygiene techniques reviewed with patient. Avoidance of cold air, extremes of temperature, rescue inhaler should be used before exercise. Order of medication reviewed with patient. Recommended use of a cool mist humidifier in the bedroom. \par \par problem 2A: Biologic eval\par - script given for blood work: asthma profile (-), food IgE panel (-), eosinophil level (330), IgE level (-), Vitamin D level (WNL)\par -Rx pending bloodwork\par \par Problem 2B: Eosinophilic Asthma  - 7/17/2022\par -set up Dupixent (11/2022, 1/2023) \par -The safety and efficacy of Nucala was established in three double-blind , randomized, placebo controlled trials in patients with severe asthma. Compared to a placebo, patients with severe asthma receiving Nucala had a fever exacerbation requiring hospitalization and.or emergency department visits, and a longer time to first exacerbation. In addition, patients with severe asthma receiving Nucala or Fasenra experienced greater reductions in their daily maintenance oral corticosteroid dose,m while maintaining asthma control compared with patients receiving placebo. Treatment with Nucala did not result in a significant improvement in lung function as measured by the volume of air exhaled by patients in one second. The most common side effects include: headache, injection site reactions back pain, weakness and fatigue; hypersensitivity reactions can occur within hours or days including swelling of the face, mouth and tongue, fainting, dizziness, hives, breathing problems and rash; herpes zoster infections have occurred. The drug is a monoclonal antibody that inhibits interleukin-5 which helps regular eosinophils, a type of white blood cell that contributes to asthma. The over-prodcution of eosinophils can cause inflammation in the lungs, increasing the frequency of asthma attacks. Patients must also take other medications, including high dose inhaled corticosteroids and at least one additional asthma drug. \par \par problem 3: sensory neuropathic cough- improved\par -off Elavil 25 mg up to TID- QHS- on the shelf \par -continue Baclofen 10mg pre-meal, PRN\par -continue Hycodan syrup - (ISTOP)\par -Add Neurontin 100 Q8H\par -Sensory neuropathic cough is an etiology of cough that is often realized once someone has been ruled out for common disease such as: asthma, COPD, eosinophilic bronchitis, bronchiectasis, post nasal drip, and GERD. It sometimes develops following a URI, herpes zoster outbreak in pharynx or thyroid or cervical spine injury. However, many patients have no identifiable antecedent explanation. \par \par problem 4: tracheobronchomalacia\par -s/p surgery with Dr. Boyd Ramirez in 11/2017\par -s/p bronchoscopy with Dr. Artis\par Tracheomalacia is usually acquired in adults and common causes include damage by tracheostomy or endotracheal intubation damaging the tracheal cartilage with increase risk with multiple intubations, prolonged intubation, and concurrent high dose steroid therapy; external chest wall trauma and surgery; chronic compression of the trachea by benign etiologies (eg, benign mediastinal goiter) or malignancy; relapsing polychondritis; or recurrent infection. Tracheomalacia can be asymptomatic, however signs or symptoms can develop as the severity of the airway narrowing progresses with major symptoms include dyspnea, cough, and sputum retention. Other symptoms include severe paroxysms of coughing, wheezing or stridor, barking cough and may be exacerbated by forced expiration, cough, and Valsalva maneuver. Tracheomalacia is diagnosed by a bronchoscopic visualization of dynamic airway collapse on dynamic chest CT. Therapy is warranted in symptomatic patients with severe tracheomalacia and includes surgical repair as tracheobronchoplasty. The patient was referred to Dr. Artis/ Dr. Kelley, at [facility] for a surgical consult.\par \par problem 5: abnormal hypersensitivity panel/allergic panel\par -s/p allergist evaluation - no treatment recommended at this time\par -Environmental measures for allergies were encouraged including mattress and pillow cover, air purifier, and environmental controls. \par \par Problem 6: s/p mycoplasma pneumonia \par -s/p prolonged Zithromax for anti inflammatory\par \par problem 7: overweight (resolved)\par -Weight loss, exercise, and diet control were discussed and are highly encouraged. Treatment options were given such as, aqua therapy, and contacting a nutritionist. Recommended to use the elliptical, stationary bike, less use of treadmill. Obesity is associated with worsening asthma, shortness of breath, and potential for cardiac disease, diabetes, and other underlying medical conditions.\par \par problem 8: GERD/LPR - active \par -continue Baclofen 10mg pre-meal - noncompliant\par -continue to use Protonix 40 mg before breakfast\par -continue Pepcid 40 mg QHS\par -recommended [employer] Probiotic supplement \par -Recommended to chew on DGL (diglyceride licorice root) before breakfast and dinner \par -Recommended to take Slippery Elm or drink Slippery Elm Tea (Throat Coat Tea) \par \par -Rule of 2s: avoid eating too much, eating too late, eating too spicy, eating two hours before bed\par -Things to avoid including overeating, spicy foods, tight clothing, eating within three hours of bed, this list is not all inclusive. \par -For treatment of reflux, possible options discussed including diet control, H2 blockers, PPIs, as well as coating motility agents discussed as treatment options. Timing of meals and proximity of last meal to sleep were discussed. If symptoms persist, a formal gastrointestinal evaluation is needed. \par \par problem 9: dysphonia\par -recommended to use [employer] Friend Lozenges\par -felt to be related to cough causing vocal cord trauma, LPR, medication effect\par -recommended hydration and time \par \par problem 10: allergic rhinitis\par -recommended xlear saline \par -recommended OTC antihistamine PRN (Claritin 10 mg QAM)\par -recommended Allergist evaluation (work up negative 2022)\par -Environmental measures for allergies were encouraged including mattress and pillow cover, air purifier, and environmental controls.\par \par problem 11: SUSAN\par -continue Rozerem 8mg qhs\par -recommended to use "Chin Strap" \par -recommended to take nocturnal medications earlier; OxyAid\par -recommended to have a mouth piece made - denied; failed CPAP\par -based on her Nunez's esophagus, EDS, ? snoring, elevated mallampati class, and poor sleep; she is being set up for an at home sleep study \par \par Sleep apnea is associated with adverse clinical consequences which an affect most organ systems. Cardiovascular disease risk includes arrhythmias, atrial fibrillation, hypertension, coronary artery disease, and stroke. Metabolic disorders include diabetes type 2, non-alcoholic fatty liver disease. Mood disorder especially depression; and cognitive decline especially in the elderly. Associations with chronic reflux/Nunez’s esophagus some but not all inclusive. \par -Reasons include arousal consistent with hypopnea; respiratory events most prominent in REM sleep or supine position; therefore sleep staging and body position are important for accurate diagnosis and estimation of AHI.\par -Good sleep hygiene was encouraged including avoiding watching television an hour before bed, keeping caffeine at a low, avoiding reading, television, or anything, in bed, no drinking any liquids three hours before bedtime, and only getting into bed when tired and ready for sleep. \par \par Problem 12: Health Maintenance/COVID19 Precautions \par -VACCINE HESITANT \par -Covid 19 precaution, vaccine and booster discussed at length and recommended \par -educated patient on COVID 19 vaccine and appropriate recommendations (hesitant due to allergies) \par - Clean your hands often. Wash your hands often with soap and water for at least 20 seconds, especially after blowing your nose, coughing, or sneezing, or having been in a public place.\par - If soap and water are not available, use a hand  that contains at least 60% alcohol.\par - To the extent possible, avoid touching high-touch surfaces in public places - elevator buttons, door handles, handrails, handshaking with people, etc. Use a tissue or your sleeve to cover your hand or finger if you must touch something.\par - Wash your hands after touching surfaces in public places.\par - Avoid touching your face, nose, eyes, etc.\par - Clean and disinfect your home to remove germs: practice routine cleaning of frequently touched surfaces (for example: tables, doorknobs, light switches, handles, desks, toilets, faucets, sinks & cell phones)\par - Avoid crowds, especially in poorly ventilated spaces. Your risk of exposure to respiratory viruses like COVID-19 may increase in crowded, closed-in settings with little air circulation if there are people in the crowd who are sick. All patients are recommended to practice social distancing and stay at least 6 feet away from others. \par - Avoid all non-essential travel including plane trips, and especially avoid embarking on cruise ships.\par -If COVID-19 is spreading in your community, take extra measures to put distance between yourself and other people to further reduce your risk of being exposed to this new virus.\par -Stay home as much as possible.\par - Consider ways of getting food brought to your house through family, social, or commercial networks\par -Be aware that the virus has been known to live in the air up to 3 hours post exposure, cardboard up to 24 hours post exposure, copper up to 4 hours post exposure, steel and plastic up to 2-3 days post exposure. Risk of transmission from these surfaces are affected by many variables.\par COVID-19 precautionary Immune Support Recommendations:\par -OTC Vitamin C 500mg BID \par -OTC Quercetin 250-500mg BID \par -OTC Zinc 75-100mg per day \par -OTC Melatonin 1 or 2mg a night \par -OTC Vitamin D 1-4000mg per day \par -OTC Tonic Water 8oz per day\par -Water 0.5-1 gallon per day\par Asthma and COVID19:\par You need to make sure your asthma is under control. This often requires the use of inhaled corticosteroids (and sometimes oral corticosteroids). Inhaled corticosteroids do not likely reduce your immune system’s ability to fight infections, but oral corticosteroids may. It is important to use the steps above to protect yourself to limit your exposure to any respiratory virus. \par \par problem 13: health maintenance \par -recommended a yearly flu shot after October 15 2018 (refused 2020)\par -recommended strep pneumonia vaccines: Prevnar-13 vaccine, followed by Pneumo vaccine 23 on year following (refused)\par -recommended early intervention for URIs\par -recommended osteoporosis evaluations\par -recommended early dermatological evaluations\par -recommended after the age of 50 to the age of 70, colonoscopy every 5 years\par \par F/U in 3-4 months\par She is encouraged to call with any changes, concerns, or questions.

## 2023-05-26 RX ORDER — DUPILUMAB 300 MG/2ML
300 INJECTION, SOLUTION SUBCUTANEOUS
Qty: 1 | Refills: 0 | Status: ACTIVE | COMMUNITY
Start: 2023-02-03 | End: 1900-01-01

## 2023-05-26 RX ORDER — DUPILUMAB 300 MG/2ML
300 INJECTION, SOLUTION SUBCUTANEOUS
Qty: 2 | Refills: 11 | Status: ACTIVE | COMMUNITY
Start: 2023-02-03 | End: 1900-01-01

## 2023-06-01 DIAGNOSIS — Z11.59 ENCOUNTER FOR SCREENING FOR OTHER VIRAL DISEASES: ICD-10-CM

## 2023-06-01 RX ORDER — TIOTROPIUM BROMIDE AND OLODATEROL 3.124; 2.736 UG/1; UG/1
2.5-2.5 SPRAY, METERED RESPIRATORY (INHALATION)
Qty: 1 | Refills: 2 | Status: ACTIVE | COMMUNITY
Start: 2023-01-17 | End: 1900-01-01

## 2023-06-01 RX ORDER — BENZONATATE 200 MG/1
200 CAPSULE ORAL 3 TIMES DAILY
Qty: 90 | Refills: 1 | Status: ACTIVE | COMMUNITY
Start: 2023-06-01 | End: 1900-01-01

## 2023-06-02 ENCOUNTER — NON-APPOINTMENT (OUTPATIENT)
Age: 78
End: 2023-06-02

## 2023-06-02 LAB
RAPID RVP RESULT: NOT DETECTED
SARS-COV-2 RNA PNL RESP NAA+PROBE: NOT DETECTED

## 2023-06-05 ENCOUNTER — APPOINTMENT (OUTPATIENT)
Dept: PULMONOLOGY | Facility: CLINIC | Age: 78
End: 2023-06-05
Payer: MEDICARE

## 2023-06-05 ENCOUNTER — NON-APPOINTMENT (OUTPATIENT)
Age: 78
End: 2023-06-05

## 2023-06-05 VITALS
DIASTOLIC BLOOD PRESSURE: 64 MMHG | WEIGHT: 293 LBS | HEART RATE: 64 BPM | OXYGEN SATURATION: 97 % | SYSTOLIC BLOOD PRESSURE: 138 MMHG | RESPIRATION RATE: 16 BRPM | BODY MASS INDEX: 55.32 KG/M2 | HEIGHT: 61 IN | TEMPERATURE: 97.4 F

## 2023-06-05 PROCEDURE — 99214 OFFICE O/P EST MOD 30 MIN: CPT | Mod: 25

## 2023-06-05 PROCEDURE — 94010 BREATHING CAPACITY TEST: CPT

## 2023-06-05 RX ORDER — TIOTROPIUM BROMIDE AND OLODATEROL 3.124; 2.736 UG/1; UG/1
2.5-2.5 SPRAY, METERED RESPIRATORY (INHALATION) DAILY
Qty: 3 | Refills: 1 | Status: ACTIVE | COMMUNITY
Start: 2023-06-05 | End: 1900-01-01

## 2023-06-05 NOTE — HISTORY OF PRESENT ILLNESS
[FreeTextEntry1] : Ms. Phipps is a 77 year old female with a history of allergic rhinitis, Nunez's esophagus, bronchoplasty/tracheoplasty, persistent cough, GERD, LPRD, SUSAN, TBM, and SOB presenting to the office today for a f/p pulmonary evaluation. Her chief complaint is \par - she notes she got sick few weeks ago \par - she notes it might be allergies \par - she notes coughing a lot \par - she notes wheezing \par - she notes bringing up some mucus \par - she notes SOB \par \par - She  denies any headaches, nausea, vomiting, fever, chills, sweats, chest pains, chest pressure, diarrhea, constipation, dysphagia, myalgia, dizziness, leg swelling, leg pain, itchy eyes, itchy ears, heartburn, reflux, or sour taste in the mouth.

## 2023-06-05 NOTE — PROCEDURE
[FreeTextEntry1] : PFT's were performed for the evaluation of Asthma\par \par PFT reveals normal flows, with an FEV1 of  1.40L, which is 80% of predicted, with irregular inspiratory limb

## 2023-06-05 NOTE — PHYSICAL EXAM
[No Acute Distress] : no acute distress [Normal Oropharynx] : normal oropharynx [III] : Mallampati Class: III [Normal Appearance] : normal appearance [No Neck Mass] : no neck mass [Normal Rate/Rhythm] : normal rate/rhythm [Normal S1, S2] : normal s1, s2 [No Murmurs] : no murmurs [No Resp Distress] : no resp distress [Clear to Auscultation Bilaterally] : clear to auscultation bilaterally [No Abnormalities] : no abnormalities [Benign] : benign [Normal Gait] : normal gait [No Clubbing] : no clubbing [No Cyanosis] : no cyanosis [No Edema] : no edema [FROM] : FROM [Normal Color/ Pigmentation] : normal color/ pigmentation [No Focal Deficits] : no focal deficits [Oriented x3] : oriented x3 [Normal Affect] : normal affect [TextBox_68] : I:E ratio 1:3; mild inspiratory wheeze

## 2023-06-05 NOTE — ADDENDUM
[FreeTextEntry1] : Documented by Leonel Johnson acting as a scribe for Dr. Denzel Saunders on (06/05/2023).\par \par All medical record entries made by the Scribe were at my, Dr. Denzel Saunders's, direction and personally dictated by me on (06/05/2023). I have reviewed the chart and agree that the record accurately reflects my personal performance of the history, physical exam, assessment and plan. I have personally directed, reviewed, and agree with the discharge instructions.

## 2023-06-05 NOTE — ASSESSMENT
[FreeTextEntry1] : Ms. Phipps is a 77 year old female with a history of recently diverticulosis, GERD, collagenous colitis,severe persistent asthma, allergies, TBM, OSAS who now comes ?PNA complicated by tracheomalacia, s/p tracheoplasty with bronchospasm. - s/p asthmatic type bronchitis and GERD, asthma/ allergy/ LPR (resolved)- Active Asthma \par \par Her cough and shortness of breath is related to:\par -acquired tracheobronchomalacia \par -asthma  - Active\par -abnormal hypersensitivity panel \par -post nasal drip syndrome\par -GERD ([place]) \par \par problem 1: severe persistent asthma-(Active) - NC with Rx- semi compliant \par -s/p Prednisone 30 mg x 5 days, 20 mg x 5 days, 10 mg x 5 days- 3/2023\par -s/p Prednisone 20mg for 7 days then 10mg for 7 days (2/2022) -11/2022\par -Information sheet given to the patient to be reviewed, this medication is never to be used without consulting the prescribing physician. Proper dietary restraint is necessary specifically salt containing foods, if any reaction may occur should be reported. \par \par -add Stiolto 2 puffs QD \par -continue to use Xopenex (.63) via nebulizer Q8H (in the morning)\par -continue to use Pulmicort 0.5 BID (after Xopenex)\par -continue to use Singulair 10 mg QHS\par -Asthma is believed to be caused by inherited (genetic) and environmental factor, but its exact cause is unknown. Asthma may be triggered by allergens, lung infections, or irritants in the air. Asthma triggers are different for each person\par -Inhaler technique reviewed as well as oral hygiene techniques reviewed with patient. Avoidance of cold air, extremes of temperature, rescue inhaler should be used before exercise. Order of medication reviewed with patient. Recommended use of a cool mist humidifier in the bedroom. \par \par problem 2A: Biologic eval\par - script given for blood work: asthma profile (-), food IgE panel (-), eosinophil level (330), IgE level (-), Vitamin D level (WNL)\par -Rx pending bloodwork- move to [facility] \par \par Problem 2B: Eosinophilic Asthma  - 7/17/2022\par -Dupixent (11/2022, 1/2023) (Cost prohibitive) \par -The safety and efficacy of Nucala was established in three double-blind , randomized, placebo controlled trials in patients with severe asthma. Compared to a placebo, patients with severe asthma receiving Nucala had a fever exacerbation requiring hospitalization and.or emergency department visits, and a longer time to first exacerbation. In addition, patients with severe asthma receiving Nucala or Fasenra experienced greater reductions in their daily maintenance oral corticosteroid dose,m while maintaining asthma control compared with patients receiving placebo. Treatment with Nucala did not result in a significant improvement in lung function as measured by the volume of air exhaled by patients in one second. The most common side effects include: headache, injection site reactions back pain, weakness and fatigue; hypersensitivity reactions can occur within hours or days including swelling of the face, mouth and tongue, fainting, dizziness, hives, breathing problems and rash; herpes zoster infections have occurred. The drug is a monoclonal antibody that inhibits interleukin-5 which helps regular eosinophils, a type of white blood cell that contributes to asthma. The over-prodcution of eosinophils can cause inflammation in the lungs, increasing the frequency of asthma attacks. Patients must also take other medications, including high dose inhaled corticosteroids and at least one additional asthma drug. \par \par problem 3: sensory neuropathic cough- improved\par -off Elavil 25 mg up to TID- QHS- on the shelf \par -continue Baclofen 10mg pre-meal, PRN\par -continue Hycodan syrup - (ISTOP)\par -Add Neurontin 100 Q8H\par -Sensory neuropathic cough is an etiology of cough that is often realized once someone has been ruled out for common disease such as: asthma, COPD, eosinophilic bronchitis, bronchiectasis, post nasal drip, and GERD. It sometimes develops following a URI, herpes zoster outbreak in pharynx or thyroid or cervical spine injury. However, many patients have no identifiable antecedent explanation. \par \par problem 4: tracheobronchomalacia\par -s/p surgery with Dr. Boyd Ramirez in 11/2017\par -s/p bronchoscopy with Dr. Artis\par Tracheomalacia is usually acquired in adults and common causes include damage by tracheostomy or endotracheal intubation damaging the tracheal cartilage with increase risk with multiple intubations, prolonged intubation, and concurrent high dose steroid therapy; external chest wall trauma and surgery; chronic compression of the trachea by benign etiologies (eg, benign mediastinal goiter) or malignancy; relapsing polychondritis; or recurrent infection. Tracheomalacia can be asymptomatic, however signs or symptoms can develop as the severity of the airway narrowing progresses with major symptoms include dyspnea, cough, and sputum retention. Other symptoms include severe paroxysms of coughing, wheezing or stridor, barking cough and may be exacerbated by forced expiration, cough, and Valsalva maneuver. Tracheomalacia is diagnosed by a bronchoscopic visualization of dynamic airway collapse on dynamic chest CT. Therapy is warranted in symptomatic patients with severe tracheomalacia and includes surgical repair as tracheobronchoplasty. The patient was referred to Dr. Artis/ Dr. Kelley, at [facility] for a surgical consult.\par \par problem 5: abnormal hypersensitivity panel/allergic panel\par -s/p allergist evaluation - no treatment recommended at this time\par -Environmental measures for allergies were encouraged including mattress and pillow cover, air purifier, and environmental controls. \par \par Problem 6: s/p mycoplasma pneumonia \par -s/p prolonged Zithromax for anti inflammatory\par \par problem 7: overweight (resolved)\par -Weight loss, exercise, and diet control were discussed and are highly encouraged. Treatment options were given such as, aqua therapy, and contacting a nutritionist. Recommended to use the elliptical, stationary bike, less use of treadmill. Obesity is associated with worsening asthma, shortness of breath, and potential for cardiac disease, diabetes, and other underlying medical conditions.\par \par problem 8: GERD/LPR - active \par -continue Baclofen 10mg pre-meal - noncompliant\par -continue to use Protonix 40 mg before breakfast\par -continue Pepcid 40 mg QHS\par -recommended [employer] Probiotic supplement \par -Recommended to chew on DGL (diglyceride licorice root) before breakfast and dinner \par -Recommended to take Slippery Elm or drink Slippery Elm Tea (Throat Coat Tea) \par \par -Rule of 2s: avoid eating too much, eating too late, eating too spicy, eating two hours before bed\par -Things to avoid including overeating, spicy foods, tight clothing, eating within three hours of bed, this list is not all inclusive. \par -For treatment of reflux, possible options discussed including diet control, H2 blockers, PPIs, as well as coating motility agents discussed as treatment options. Timing of meals and proximity of last meal to sleep were discussed. If symptoms persist, a formal gastrointestinal evaluation is needed. \par \par problem 9: dysphonia\par -recommended to use Fisherman's Friend Lozenges\par -felt to be related to cough causing vocal cord trauma, LPR, medication effect\par -recommended hydration and time \par \par problem 10: allergic rhinitis\par -recommended xlear saline \par -recommended OTC antihistamine PRN (Claritin 10 mg QAM)\par -recommended Allergist evaluation (work up negative 2022)\par -Environmental measures for allergies were encouraged including mattress and pillow cover, air purifier, and environmental controls.\par \par problem 11: SUSAN\par -continue Rozerem 8mg qhs\par -recommended to use "Chin Strap" \par -recommended to take nocturnal medications earlier; OxyAid\par -recommended to have a mouth piece made - denied; failed CPAP\par -based on her Nunez's esophagus, EDS, ? snoring, elevated mallampati class, and poor sleep; she is being set up for an at home sleep study \par \par Sleep apnea is associated with adverse clinical consequences which an affect most organ systems. Cardiovascular disease risk includes arrhythmias, atrial fibrillation, hypertension, coronary artery disease, and stroke. Metabolic disorders include diabetes type 2, non-alcoholic fatty liver disease. Mood disorder especially depression; and cognitive decline especially in the elderly. Associations with chronic reflux/Nunez’s esophagus some but not all inclusive. \par -Reasons include arousal consistent with hypopnea; respiratory events most prominent in REM sleep or supine position; therefore sleep staging and body position are important for accurate diagnosis and estimation of AHI.\par -Good sleep hygiene was encouraged including avoiding watching television an hour before bed, keeping caffeine at a low, avoiding reading, television, or anything, in bed, no drinking any liquids three hours before bedtime, and only getting into bed when tired and ready for sleep. \par \par Problem 12: Health Maintenance/COVID19 Precautions \par -VACCINE HESITANT \par -Covid 19 precaution, vaccine and booster discussed at length and recommended \par -educated patient on COVID 19 vaccine and appropriate recommendations (hesitant due to allergies) \par - Clean your hands often. Wash your hands often with soap and water for at least 20 seconds, especially after blowing your nose, coughing, or sneezing, or having been in a public place.\par - If soap and water are not available, use a hand  that contains at least 60% alcohol.\par - To the extent possible, avoid touching high-touch surfaces in public places - elevator buttons, door handles, handrails, handshaking with people, etc. Use a tissue or your sleeve to cover your hand or finger if you must touch something.\par - Wash your hands after touching surfaces in public places.\par - Avoid touching your face, nose, eyes, etc.\par - Clean and disinfect your home to remove germs: practice routine cleaning of frequently touched surfaces (for example: tables, doorknobs, light switches, handles, desks, toilets, faucets, sinks & cell phones)\par - Avoid crowds, especially in poorly ventilated spaces. Your risk of exposure to respiratory viruses like COVID-19 may increase in crowded, closed-in settings with little air circulation if there are people in the crowd who are sick. All patients are recommended to practice social distancing and stay at least 6 feet away from others. \par - Avoid all non-essential travel including plane trips, and especially avoid embarking on cruise ships.\par -If COVID-19 is spreading in your community, take extra measures to put distance between yourself and other people to further reduce your risk of being exposed to this new virus.\par -Stay home as much as possible.\par - Consider ways of getting food brought to your house through family, social, or commercial networks\par -Be aware that the virus has been known to live in the air up to 3 hours post exposure, cardboard up to 24 hours post exposure, copper up to 4 hours post exposure, steel and plastic up to 2-3 days post exposure. Risk of transmission from these surfaces are affected by many variables.\par COVID-19 precautionary Immune Support Recommendations:\par -OTC Vitamin C 500mg BID \par -OTC Quercetin 250-500mg BID \par -OTC Zinc 75-100mg per day \par -OTC Melatonin 1 or 2mg a night \par -OTC Vitamin D 1-4000mg per day \par -OTC Tonic Water 8oz per day\par -Water 0.5-1 gallon per day\par Asthma and COVID19:\par You need to make sure your asthma is under control. This often requires the use of inhaled corticosteroids (and sometimes oral corticosteroids). Inhaled corticosteroids do not likely reduce your immune system’s ability to fight infections, but oral corticosteroids may. It is important to use the steps above to protect yourself to limit your exposure to any respiratory virus. \par \par problem 13: health maintenance \par -recommended a yearly flu shot after October 15 2018 (refused 2020)\par -recommended strep pneumonia vaccines: Prevnar-13 vaccine, followed by Pneumo vaccine 23 on year following (refused)\par -recommended early intervention for URIs\par -recommended osteoporosis evaluations\par -recommended early dermatological evaluations\par -recommended after the age of 50 to the age of 70, colonoscopy every 5 years\par \par F/U in 3-4 months\par She is encouraged to call with any changes, concerns, or questions.

## 2023-07-21 ENCOUNTER — NON-APPOINTMENT (OUTPATIENT)
Age: 78
End: 2023-07-21

## 2023-07-24 LAB
CDIFF BY PCR: NOT DETECTED
GI PCR PANEL: NOT DETECTED

## 2023-07-25 LAB
BACTERIA STL CULT: NORMAL
G LAMBLIA AG STL QL: NORMAL

## 2023-07-26 LAB
CALPROTECTIN FECAL: 1110 UG/G
DEPRECATED O AND P PREP STL: NORMAL

## 2023-07-27 ENCOUNTER — NON-APPOINTMENT (OUTPATIENT)
Age: 78
End: 2023-07-27

## 2023-08-02 ENCOUNTER — APPOINTMENT (OUTPATIENT)
Dept: PULMONOLOGY | Facility: CLINIC | Age: 78
End: 2023-08-02

## 2023-08-08 ENCOUNTER — APPOINTMENT (OUTPATIENT)
Dept: GASTROENTEROLOGY | Facility: CLINIC | Age: 78
End: 2023-08-08
Payer: MEDICARE

## 2023-08-08 VITALS — WEIGHT: 128 LBS | HEIGHT: 61 IN | BODY MASS INDEX: 24.17 KG/M2

## 2023-08-08 VITALS
HEART RATE: 67 BPM | TEMPERATURE: 97.6 F | DIASTOLIC BLOOD PRESSURE: 71 MMHG | SYSTOLIC BLOOD PRESSURE: 148 MMHG | OXYGEN SATURATION: 97 %

## 2023-08-08 DIAGNOSIS — K52.9 NONINFECTIVE GASTROENTERITIS AND COLITIS, UNSPECIFIED: ICD-10-CM

## 2023-08-08 PROCEDURE — 99214 OFFICE O/P EST MOD 30 MIN: CPT

## 2023-08-08 RX ORDER — SODIUM SULFATE, POTASSIUM SULFATE AND MAGNESIUM SULFATE 1.6; 3.13; 17.5 G/177ML; G/177ML; G/177ML
17.5-3.13-1.6 SOLUTION ORAL
Qty: 1 | Refills: 0 | Status: ACTIVE | COMMUNITY
Start: 2023-08-08 | End: 1900-01-01

## 2023-08-08 RX ORDER — SODIUM PICOSULFATE, MAGNESIUM OXIDE, AND ANHYDROUS CITRIC ACID 10; 3.5; 12 MG/160ML; G/160ML; G/160ML
10-3.5-12 MG-GM LIQUID ORAL
Qty: 1 | Refills: 0 | Status: ACTIVE | COMMUNITY
Start: 2023-08-08 | End: 1900-01-01

## 2023-08-08 NOTE — ASSESSMENT
[FreeTextEntry1] : Change of bowel habit, diarrhea, elevated calprotectin, suspect inflammatory bowel disease, cannot rule out microscopic colitis  Plan Above discussed in detail with patient.  She expresses good understanding of my clinical impression Indications risks benefits and alternatives to colonoscopy reviewed.  Patient agreeable.

## 2023-08-08 NOTE — HISTORY OF PRESENT ILLNESS
[FreeTextEntry1] : 78-year-old female 2 months of diarrhea Recent stool testing negative for pathogen Calprotectin over thousand Involuntary weight loss noted No bleeding, no abdominal pain

## 2023-08-10 ENCOUNTER — APPOINTMENT (OUTPATIENT)
Dept: PULMONOLOGY | Facility: CLINIC | Age: 78
End: 2023-08-10
Payer: MEDICARE

## 2023-08-10 VITALS
BODY MASS INDEX: 24.17 KG/M2 | HEART RATE: 75 BPM | RESPIRATION RATE: 16 BRPM | HEIGHT: 61 IN | OXYGEN SATURATION: 96 % | TEMPERATURE: 97.2 F | SYSTOLIC BLOOD PRESSURE: 140 MMHG | DIASTOLIC BLOOD PRESSURE: 76 MMHG | WEIGHT: 128 LBS

## 2023-08-10 DIAGNOSIS — J45.909 UNSPECIFIED ASTHMA, UNCOMPLICATED: ICD-10-CM

## 2023-08-10 PROCEDURE — 95012 NITRIC OXIDE EXP GAS DETER: CPT

## 2023-08-10 PROCEDURE — 99214 OFFICE O/P EST MOD 30 MIN: CPT | Mod: 25

## 2023-08-10 RX ORDER — LEVALBUTEROL HYDROCHLORIDE 0.63 MG/3ML
0.63 SOLUTION RESPIRATORY (INHALATION)
Qty: 120 | Refills: 5 | Status: ACTIVE | COMMUNITY
Start: 2023-06-01 | End: 1900-01-01

## 2023-08-10 RX ORDER — BUDESONIDE 0.5 MG/2ML
0.5 INHALANT ORAL TWICE DAILY
Qty: 60 | Refills: 2 | Status: ACTIVE | COMMUNITY
Start: 2022-02-16 | End: 1900-01-01

## 2023-08-10 NOTE — HISTORY OF PRESENT ILLNESS
[FreeTextEntry1] : Ms. Phipps is a 78 year old female with a history of allergic rhinitis, Nunez's esophagus, bronchoplasty/tracheoplasty, persistent cough, GERD, LPRD, SUSAN, TBM, and SOB presenting to the office today for a f/p pulmonary evaluation. Her chief complaint is   - she notes coughing and wheezing - she notes having a lump in her throat she needs to clear - she notes diarrhea for the last 2 months  - she notes producing yellow phlegm - sense of taste and smell are good - she notes having a colonoscopy on 8/25 - she notes taking Pantoprazole  - she notes taking no medications for her breathing and coughing  - she notes still having her nebulizer at home  -she denies any headaches, nausea, emesis, fever, chills, sweats, chest pain, chest pressure, palpitations, constipation, vertigo, dysphagia, heartburn, reflux, itchy eyes, itchy ears, leg swelling, leg pain, arthralgias, myalgias, or sour taste in the mouth.

## 2023-08-10 NOTE — PROCEDURE
[FreeTextEntry1] : Unable to perform SPI   FENO was 86; a normal value being less than 25 Fractional exhaled nitric oxide (FENO) is regarded as a simple, noninvasive method for assessing eosinophilic airway inflammation. Produced by a variety of cells within the lung, nitric oxide (NO) concentrations are generally low in healthy individuals. However, high concentrations of NO appear to be involved in nonspecific host defense mechanisms and chronic inflammatory diseases such as asthma. The American Thoracic Society (ATS) therefore has recommended using FENO to aid in the diagnosis and monitoring of eosinophilic airway inflammation and asthma, and for identifying steroid responsive individuals whose chronic respiratory symptoms may be caused by airway inflammation.

## 2023-08-10 NOTE — PHYSICAL EXAM
[No Acute Distress] : no acute distress [Normal Oropharynx] : normal oropharynx [III] : Mallampati Class: III [Normal Appearance] : normal appearance [No Neck Mass] : no neck mass [Normal Rate/Rhythm] : normal rate/rhythm [Normal S1, S2] : normal s1, s2 [No Murmurs] : no murmurs [No Resp Distress] : no resp distress [Clear to Auscultation Bilaterally] : clear to auscultation bilaterally [No Abnormalities] : no abnormalities [Benign] : benign [Normal Gait] : normal gait [No Clubbing] : no clubbing [No Cyanosis] : no cyanosis [No Edema] : no edema [FROM] : FROM [Normal Color/ Pigmentation] : normal color/ pigmentation [No Focal Deficits] : no focal deficits [Oriented x3] : oriented x3 [Normal Affect] : normal affect [TextBox_68] : I:E ratio 1:3; expiratory wheeze bilaterally

## 2023-08-10 NOTE — ASSESSMENT
[FreeTextEntry1] : Ms. Phipps is a 78 year old female with a history of recently diverticulosis, GERD, collagenous colitis,severe persistent asthma, allergies, TBM, OSAS who now comes ?PNA complicated by tracheomalacia, s/p tracheoplasty with bronchospasm. - s/p asthmatic type bronchitis and GERD, asthma/ allergy/ LPR (resolved)- Active Asthma (NC w/ Rx)  Her cough and shortness of breath is related to: -acquired tracheobronchomalacia  -asthma  - Active -abnormal hypersensitivity panel  -post nasal drip syndrome -GERD ([place])   problem 1: severe persistent asthma-(Active) - NC with Rx- noncompliant  -s/p Prednisone 30 mg x 5 days, 20 mg x 5 days, 10 mg x 5 days- 3/2023 -s/p Prednisone 20mg for 7 days then 10mg for 7 days (2/2022) -11/2022 -Information sheet given to the patient to be reviewed, this medication is never to be used without consulting the prescribing physician. Proper dietary restraint is necessary specifically salt containing foods, if any reaction may occur should be reported.   -add Stiolto 2 puffs QD  -continue to use Xopenex (.63) via nebulizer Q8H (in the morning) -continue to use Pulmicort 0.5 BID (after Xopenex) -continue to use Singulair 10 mg QHS -Asthma is believed to be caused by inherited (genetic) and environmental factor, but its exact cause is unknown. Asthma may be triggered by allergens, lung infections, or irritants in the air. Asthma triggers are different for each person -Inhaler technique reviewed as well as oral hygiene techniques reviewed with patient. Avoidance of cold air, extremes of temperature, rescue inhaler should be used before exercise. Order of medication reviewed with patient. Recommended use of a cool mist humidifier in the bedroom.   problem 2A: Biologic eval - script given for blood work: asthma profile (-), food IgE panel (-), eosinophil level (330), IgE level (-), Vitamin D level (WNL) -Rx move to [facility] (now)  Problem 2B: Eosinophilic Asthma  - 7/17/2022 -Dupixent (11/2022, 1/2023) (Cost prohibitive)  -The safety and efficacy of Nucala was established in three double-blind , randomized, placebo controlled trials in patients with severe asthma. Compared to a placebo, patients with severe asthma receiving Nucala had a fever exacerbation requiring hospitalization and.or emergency department visits, and a longer time to first exacerbation. In addition, patients with severe asthma receiving Nucala or Fasenra experienced greater reductions in their daily maintenance oral corticosteroid dose,m while maintaining asthma control compared with patients receiving placebo. Treatment with Nucala did not result in a significant improvement in lung function as measured by the volume of air exhaled by patients in one second. The most common side effects include: headache, injection site reactions back pain, weakness and fatigue; hypersensitivity reactions can occur within hours or days including swelling of the face, mouth and tongue, fainting, dizziness, hives, breathing problems and rash; herpes zoster infections have occurred. The drug is a monoclonal antibody that inhibits interleukin-5 which helps regular eosinophils, a type of white blood cell that contributes to asthma. The over-prodcution of eosinophils can cause inflammation in the lungs, increasing the frequency of asthma attacks. Patients must also take other medications, including high dose inhaled corticosteroids and at least one additional asthma drug.   problem 3: sensory neuropathic cough- improved -off Elavil 25 mg up to TID- QHS- on the shelf  -continue Baclofen 10mg pre-meal, PRN -continue Hycodan syrup - (ISTOP) -Add Neurontin 100 Q8H -Sensory neuropathic cough is an etiology of cough that is often realized once someone has been ruled out for common disease such as: asthma, COPD, eosinophilic bronchitis, bronchiectasis, post nasal drip, and GERD. It sometimes develops following a URI, herpes zoster outbreak in pharynx or thyroid or cervical spine injury. However, many patients have no identifiable antecedent explanation.   problem 4: tracheobronchomalacia -s/p surgery with Dr. Boyd Ramirez in 11/2017 -s/p bronchoscopy with Dr. Artis Tracheomalacia is usually acquired in adults and common causes include damage by tracheostomy or endotracheal intubation damaging the tracheal cartilage with increase risk with multiple intubations, prolonged intubation, and concurrent high dose steroid therapy; external chest wall trauma and surgery; chronic compression of the trachea by benign etiologies (eg, benign mediastinal goiter) or malignancy; relapsing polychondritis; or recurrent infection. Tracheomalacia can be asymptomatic, however signs or symptoms can develop as the severity of the airway narrowing progresses with major symptoms include dyspnea, cough, and sputum retention. Other symptoms include severe paroxysms of coughing, wheezing or stridor, barking cough and may be exacerbated by forced expiration, cough, and Valsalva maneuver. Tracheomalacia is diagnosed by a bronchoscopic visualization of dynamic airway collapse on dynamic chest CT. Therapy is warranted in symptomatic patients with severe tracheomalacia and includes surgical repair as tracheobronchoplasty. The patient was referred to Dr. Artis/ Dr. Kelley, at [facility] for a surgical consult.  problem 5: abnormal hypersensitivity panel/allergic panel -s/p allergist evaluation - no treatment recommended at this time -Environmental measures for allergies were encouraged including mattress and pillow cover, air purifier, and environmental controls.   Problem 6: s/p mycoplasma pneumonia  -s/p prolonged Zithromax for anti inflammatory  problem 7: overweight (resolved) -Weight loss, exercise, and diet control were discussed and are highly encouraged. Treatment options were given such as, aqua therapy, and contacting a nutritionist. Recommended to use the elliptical, stationary bike, less use of treadmill. Obesity is associated with worsening asthma, shortness of breath, and potential for cardiac disease, diabetes, and other underlying medical conditions.  problem 8: GERD/LPR - active  -continue Baclofen 10mg pre-meal - noncompliant -continue to use Protonix 40 mg before breakfast -continue Pepcid 40 mg QHS -recommended [employer] Probiotic supplement  -Recommended to chew on DGL (diglyceride licorice root) before breakfast and dinner  -Recommended to take Slippery Elm or drink Slippery Elm Tea (Throat Coat Tea)   -Rule of 2s: avoid eating too much, eating too late, eating too spicy, eating two hours before bed -Things to avoid including overeating, spicy foods, tight clothing, eating within three hours of bed, this list is not all inclusive.  -For treatment of reflux, possible options discussed including diet control, H2 blockers, PPIs, as well as coating motility agents discussed as treatment options. Timing of meals and proximity of last meal to sleep were discussed. If symptoms persist, a formal gastrointestinal evaluation is needed.   problem 9: dysphonia -recommended to use Fisherman's Friend Lozenges -felt to be related to cough causing vocal cord trauma, LPR, medication effect -recommended hydration and time   problem 10: allergic rhinitis -recommended xlear saline  -recommended OTC antihistamine PRN (Claritin 10 mg QAM) -recommended Allergist evaluation (work up negative 2022) -Environmental measures for allergies were encouraged including mattress and pillow cover, air purifier, and environmental controls.  problem 11: SUSAN -continue Rozerem 8mg qhs -recommended to use "Chin Strap"  -recommended to take nocturnal medications earlier; OxyAid -recommended to have a mouth piece made - denied; failed CPAP -based on her Nunez's esophagus, EDS, ? snoring, elevated mallampati class, and poor sleep; she is being set up for an at home sleep study   Sleep apnea is associated with adverse clinical consequences which an affect most organ systems. Cardiovascular disease risk includes arrhythmias, atrial fibrillation, hypertension, coronary artery disease, and stroke. Metabolic disorders include diabetes type 2, non-alcoholic fatty liver disease. Mood disorder especially depression; and cognitive decline especially in the elderly. Associations with chronic reflux/Nunez's esophagus some but not all inclusive.  -Reasons include arousal consistent with hypopnea; respiratory events most prominent in REM sleep or supine position; therefore sleep staging and body position are important for accurate diagnosis and estimation of AHI. -Good sleep hygiene was encouraged including avoiding watching television an hour before bed, keeping caffeine at a low, avoiding reading, television, or anything, in bed, no drinking any liquids three hours before bedtime, and only getting into bed when tired and ready for sleep.   Problem 12: Health Maintenance/COVID19 Precautions  -VACCINE HESITANT  -Covid 19 precaution, vaccine and booster discussed at length and recommended  -educated patient on COVID 19 vaccine and appropriate recommendations (hesitant due to allergies)  - Clean your hands often. Wash your hands often with soap and water for at least 20 seconds, especially after blowing your nose, coughing, or sneezing, or having been in a public place. - If soap and water are not available, use a hand  that contains at least 60% alcohol. - To the extent possible, avoid touching high-touch surfaces in public places - elevator buttons, door handles, handrails, handshaking with people, etc. Use a tissue or your sleeve to cover your hand or finger if you must touch something. - Wash your hands after touching surfaces in public places. - Avoid touching your face, nose, eyes, etc. - Clean and disinfect your home to remove germs: practice routine cleaning of frequently touched surfaces (for example: tables, doorknobs, light switches, handles, desks, toilets, faucets, sinks & cell phones) - Avoid crowds, especially in poorly ventilated spaces. Your risk of exposure to respiratory viruses like COVID-19 may increase in crowded, closed-in settings with little air circulation if there are people in the crowd who are sick. All patients are recommended to practice social distancing and stay at least 6 feet away from others.  - Avoid all non-essential travel including plane trips, and especially avoid embarking on cruise ships. -If COVID-19 is spreading in your community, take extra measures to put distance between yourself and other people to further reduce your risk of being exposed to this new virus. -Stay home as much as possible. - Consider ways of getting food brought to your house through family, social, or commercial networks -Be aware that the virus has been known to live in the air up to 3 hours post exposure, cardboard up to 24 hours post exposure, copper up to 4 hours post exposure, steel and plastic up to 2-3 days post exposure. Risk of transmission from these surfaces are affected by many variables. COVID-19 precautionary Immune Support Recommendations: -OTC Vitamin C 500mg BID  -OTC Quercetin 250-500mg BID  -OTC Zinc 75-100mg per day  -OTC Melatonin 1 or 2mg a night  -OTC Vitamin D 1-4000mg per day  -OTC Tonic Water 8oz per day -Water 0.5-1 gallon per day Asthma and COVID19: You need to make sure your asthma is under control. This often requires the use of inhaled corticosteroids (and sometimes oral corticosteroids). Inhaled corticosteroids do not likely reduce your immune system's ability to fight infections, but oral corticosteroids may. It is important to use the steps above to protect yourself to limit your exposure to any respiratory virus.   problem 13: health maintenance  -recommended a yearly flu shot after October 15 2018 (refused 2020) -recommended strep pneumonia vaccines: Prevnar-13 vaccine, followed by Pneumo vaccine 23 on year following (refused) -recommended early intervention for URIs -recommended osteoporosis evaluations -recommended early dermatological evaluations -recommended after the age of 50 to the age of 70, colonoscopy every 5 years  F/U in 3-4 months She is encouraged to call with any changes, concerns, or questions.

## 2023-08-10 NOTE — ADDENDUM
[FreeTextEntry1] : Documented by Ed Abrams acting as a scribe for Dr. Denzel Saunders on 08/10/2023 .  All medical record entries made by the Scribe were at my, Dr. Denzel Saunders's, direction and personally dictated by me on 08/10/2023 . I have reviewed the chart and agree that the record accurately reflects my personal performance of the history, physical exam, assessment and plan. I have also personally directed, reviewed, and agree with the discharge instructions.

## 2023-08-25 ENCOUNTER — APPOINTMENT (OUTPATIENT)
Dept: GASTROENTEROLOGY | Facility: HOSPITAL | Age: 78
End: 2023-08-25

## 2023-08-25 ENCOUNTER — OUTPATIENT (OUTPATIENT)
Dept: OUTPATIENT SERVICES | Facility: HOSPITAL | Age: 78
LOS: 1 days | End: 2023-08-25
Payer: MEDICARE

## 2023-08-25 ENCOUNTER — RESULT REVIEW (OUTPATIENT)
Age: 78
End: 2023-08-25

## 2023-08-25 ENCOUNTER — TRANSCRIPTION ENCOUNTER (OUTPATIENT)
Age: 78
End: 2023-08-25

## 2023-08-25 VITALS
HEART RATE: 74 BPM | OXYGEN SATURATION: 98 % | SYSTOLIC BLOOD PRESSURE: 155 MMHG | RESPIRATION RATE: 18 BRPM | DIASTOLIC BLOOD PRESSURE: 76 MMHG

## 2023-08-25 VITALS
DIASTOLIC BLOOD PRESSURE: 67 MMHG | SYSTOLIC BLOOD PRESSURE: 152 MMHG | WEIGHT: 128.09 LBS | HEART RATE: 65 BPM | HEIGHT: 60.43 IN | OXYGEN SATURATION: 99 % | TEMPERATURE: 98 F | RESPIRATION RATE: 13 BRPM

## 2023-08-25 DIAGNOSIS — K52.9 NONINFECTIVE GASTROENTERITIS AND COLITIS, UNSPECIFIED: ICD-10-CM

## 2023-08-25 DIAGNOSIS — Z90.49 ACQUIRED ABSENCE OF OTHER SPECIFIED PARTS OF DIGESTIVE TRACT: Chronic | ICD-10-CM

## 2023-08-25 DIAGNOSIS — Z98.41 CATARACT EXTRACTION STATUS, RIGHT EYE: Chronic | ICD-10-CM

## 2023-08-25 DIAGNOSIS — Z98.890 OTHER SPECIFIED POSTPROCEDURAL STATES: Chronic | ICD-10-CM

## 2023-08-25 PROCEDURE — 45380 COLONOSCOPY AND BIOPSY: CPT | Mod: GC

## 2023-08-25 PROCEDURE — 45380 COLONOSCOPY AND BIOPSY: CPT

## 2023-08-25 PROCEDURE — 88305 TISSUE EXAM BY PATHOLOGIST: CPT | Mod: 26

## 2023-08-25 PROCEDURE — 88305 TISSUE EXAM BY PATHOLOGIST: CPT

## 2023-08-25 DEVICE — NET RETRV ROT ROTH 2.5MMX230CM: Type: IMPLANTABLE DEVICE | Status: FUNCTIONAL

## 2023-08-25 RX ORDER — BUDESONIDE, MICRONIZED 100 %
2 POWDER (GRAM) MISCELLANEOUS
Refills: 0 | DISCHARGE

## 2023-08-25 RX ORDER — ALBUTEROL 90 UG/1
2 AEROSOL, METERED ORAL
Refills: 0 | DISCHARGE

## 2023-08-25 RX ORDER — FAMOTIDINE 10 MG/ML
1 INJECTION INTRAVENOUS
Refills: 0 | DISCHARGE

## 2023-08-25 NOTE — PRE PROCEDURE NOTE - PRE PROCEDURE EVALUATION
Attending Physician:        Andriy Babcock MD                    Procedure:    Indication for Procedure: diarrhea  ________________________________________________________  PAST MEDICAL & SURGICAL HISTORY:  Hypertension      Ulcer      Colitis      Asthma      Hiatal hernia      Nephrolithiasis  2006      GERD (gastroesophageal reflux disease)      HLD (hyperlipidemia)      H/O gastric ulcer      Sleep apnea      Tracheobronchomalacia      COVID-19      History of appendectomy      History of cataract surgery, right      History of surgery  tracheobronchoplasty 11/2017        ALLERGIES:  quinidine (Unknown)  Breo Ellipta (Other)    HOME MEDICATIONS:    AICD/PPM: [ ] yes   [x ] no    PERTINENT LAB DATA:                      PHYSICAL EXAMINATION:    T(C): --  HR: --  BP: --  RR: --  SpO2: --    Constitutional: NAD  HEENT: PERRLA, EOMI,    Neck:  No JVD  Respiratory: CTAB/L  Cardiovascular: S1 and S2  Gastrointestinal: BS+, soft, NT/ND  Extremities: No peripheral edema  Neurological: A/O x 3, no focal deficits  Psychiatric: Normal mood, normal affect  Skin: No rashes    ASA Class: I [ ]  II [x ]  III [ ]  IV [ ]    COMMENTS:    The patient is a suitable candidate for the planned procedure unless box checked [ ]  No, explain:     Attending Physician:        Andriy Babcock MD                    Procedure: Colonoscopy     Indication for Procedure: diarrhea  ________________________________________________________  PAST MEDICAL & SURGICAL HISTORY:  Hypertension      Ulcer      Colitis      Asthma      Hiatal hernia      Nephrolithiasis  2006      GERD (gastroesophageal reflux disease)      HLD (hyperlipidemia)      H/O gastric ulcer      Sleep apnea      Tracheobronchomalacia      COVID-19      History of appendectomy      History of cataract surgery, right      History of surgery  tracheobronchoplasty 11/2017        ALLERGIES:  quinidine (Unknown)  Breo Ellipta (Other)    HOME MEDICATIONS:    AICD/PPM: [ ] yes   [x ] no    PERTINENT LAB DATA:                      PHYSICAL EXAMINATION:  Vital Signs Last 24 Hrs  T(C): 36.4 (25 Aug 2023 10:23), Max: 36.4 (25 Aug 2023 10:23)  T(F): 97.5 (25 Aug 2023 10:23), Max: 97.5 (25 Aug 2023 10:23)  HR: 65 (25 Aug 2023 10:23) (65 - 65)  BP: 152/67 (25 Aug 2023 10:23) (152/67 - 152/67)  BP(mean): --  RR: 13 (25 Aug 2023 10:23) (13 - 13)  SpO2: 99% (25 Aug 2023 10:23) (99% - 99%)    Parameters below as of 25 Aug 2023 10:23  Patient On (Oxygen Delivery Method): room air    Constitutional: NAD  HEENT: PERRLA, EOMI,    Neck:  No JVD  Respiratory: CTAB/L  Cardiovascular: S1 and S2  Gastrointestinal: BS+, soft, NT/ND  Extremities: No peripheral edema  Neurological: A/O x 3, no focal deficits  Psychiatric: Normal mood, normal affect  Skin: No rashes    ASA Class: I [ ]  II [x ]  III [ ]  IV [ ]    COMMENTS:    The patient is a suitable candidate for the planned procedure unless box checked [ ]  No, explain:

## 2023-08-25 NOTE — ASU PATIENT PROFILE, ADULT - PATIENT'S SEXUAL ORIENTATION
Fetus is active. Group B strep screening done today. Labor and delivery instructions reviewed today.   Heterosexual

## 2023-08-25 NOTE — ASU PREOP CHECKLIST - WEIGHT IN KG
58.1 Take an antacid such as Gaviscon when he had the chest discomfort.    LDL - bad type - improves with diet and medications: typically statins; most other medications that lower LDL have not been proven to prevent heart attacks.  May not improve significantly with exercise alone.  Ideally less than 100 mg/dl. If you have coronary artery disease, this should be well below 70 mg/dl.    HDL - good type - improves with exercise.  Ideally greater than 50 mg/dl.    TGs (triglycerides) - also bad - can change very quickly and considerably with certain foods. Improve with diet and exercise  In some cases a low carbohydrate diet will lower TGs better than a low fat diet.  Ideal range  mg/dl.    Sugar, fat and cholesterol in food:     A sensible diet that limits the intake of sugars, saturated (bad) fats and trans fats while increasing the intake of unsaturated (good)  fats and plant proteins is the basis of the current dietary recommendations.      We now recommend drastically reducing the intake of sugar. There is less emphasis on excluding fat.     Cholesterol in our food is no longer a significant concern, mainly because it is generally present in small amounts. However please do not confuse this with the role of cholesterol in our blood and arteries. Make no mistake, it is cholesterol that clogs up our arteries whether it comes from our food or is manufactured by our bodies.       Most foods that are high in cholesterol are also high in saturated fat. But there is way more saturated fat than cholesterol in these foods. So its the saturated fat content that matters more than cholesterol. On the other hand there are a handful of foods that are high in cholesterol but do not contain much saturated fat: eggs, shrimp, crab legs and crawfish are OK to eat.       Saturated fat is the bad fat - you should limit your intake of this. Deep fried foods, meats and other animal fats are high in saturated fat. Cookies, donuts and  most dessert and cakes are usually high in both saturated fat and sugar.       Unsaturated fat is the good fat. It contains the same number of calories as saturated fat but these fats do not get deposited in our arteries. The Mediterranean style diet encourages the intake of unsaturated fat - olive oil, avocado and unsalted nuts. So instead of baking a piece of fish, it may be better to pan-farmer it using olive oil.     You should eat a few servings of vegetables (and fruit as long as you are not diabetic) everyday. Substitute some plant proteins in place of meat: beans, lentils, quinoa and oatmeal. They are lean proteins.     Do not use stick butter or stick margarine. Butter that comes in a tub is soft butter. It consists of 1/2 butter and 1/2 vegetable oil., either canola or olive oil It is fine to use that.       Trans fats should definitely be avoided. Most foods that are labelled as containing 0 gms of trans fat can still contain several hundred milligrams of trans fat: creamer, margarine, refrigerator dough, deep fried foods, ready made frosting, potato, corn and torilla chips, cakes, cookies, pie crusts and crackers containing shortening made with hydrogenated vegetable oil.

## 2023-08-25 NOTE — ASU DISCHARGE PLAN (ADULT/PEDIATRIC) - CALL YOUR DOCTOR IF YOU HAVE ANY OF THE FOLLOWING:
100.4/Bleeding that does not stop/Pain not relieved by Medications/Fever greater than (need to indicate Fahrenheit or Celsius)/Nausea and vomiting that does not stop/Inability to tolerate liquids or foods

## 2023-08-25 NOTE — ASU PATIENT PROFILE, ADULT - FALL HARM RISK - UNIVERSAL INTERVENTIONS
Bed in lowest position, wheels locked, appropriate side rails in place/Call bell, personal items and telephone in reach/Instruct patient to call for assistance before getting out of bed or chair/Non-slip footwear when patient is out of bed/[place] to call system/Physically safe environment - no spills, clutter or unnecessary equipment/Purposeful Proactive Rounding/Room/bathroom lighting operational, light cord in reach

## 2023-08-25 NOTE — ASU PATIENT PROFILE, ADULT - NSICDXPASTSURGICALHX_GEN_ALL_CORE_FT
PAST SURGICAL HISTORY:  History of appendectomy     History of cataract surgery, right     History of surgery tracheobronchoplasty 11/2017

## 2023-08-30 LAB — SURGICAL PATHOLOGY STUDY: SIGNIFICANT CHANGE UP

## 2023-09-07 DIAGNOSIS — R19.7 DIARRHEA, UNSPECIFIED: ICD-10-CM

## 2023-09-12 LAB
ENDOMYSIUM IGA SER QL: NEGATIVE
ENDOMYSIUM IGA TITR SER: NORMAL

## 2023-09-13 ENCOUNTER — NON-APPOINTMENT (OUTPATIENT)
Age: 78
End: 2023-09-13

## 2023-09-13 LAB
TTG IGA SER IA-ACNC: <1.2 U/ML
TTG IGA SER-ACNC: NEGATIVE
TTG IGG SER IA-ACNC: 14.7 U/ML
TTG IGG SER IA-ACNC: POSITIVE

## 2023-10-05 ENCOUNTER — APPOINTMENT (OUTPATIENT)
Dept: PULMONOLOGY | Facility: CLINIC | Age: 78
End: 2023-10-05
Payer: MEDICARE

## 2023-10-05 VITALS
WEIGHT: 131 LBS | HEIGHT: 61 IN | TEMPERATURE: 97.6 F | BODY MASS INDEX: 24.73 KG/M2 | OXYGEN SATURATION: 98 % | HEART RATE: 78 BPM | RESPIRATION RATE: 18 BRPM | SYSTOLIC BLOOD PRESSURE: 120 MMHG | DIASTOLIC BLOOD PRESSURE: 70 MMHG

## 2023-10-05 PROCEDURE — 95012 NITRIC OXIDE EXP GAS DETER: CPT

## 2023-10-05 PROCEDURE — 99214 OFFICE O/P EST MOD 30 MIN: CPT | Mod: 25

## 2023-10-05 PROCEDURE — 94010 BREATHING CAPACITY TEST: CPT

## 2023-10-09 ENCOUNTER — APPOINTMENT (OUTPATIENT)
Dept: PULMONOLOGY | Facility: CLINIC | Age: 78
End: 2023-10-09

## 2023-10-23 NOTE — ASU DISCHARGE PLAN (ADULT/PEDIATRIC) - DISCHARGE PLAN IS COMPLETE AND GIVEN TO PATIENT
1. Do you have an Epinephrine auto injector with you? Yes    2. What antihistamine did you take today (Examples: Zyrtec/Cetirizine, Claritin, Xyzal, Benadryl, Allegra)? na    3. If you have asthma: Have you had any shortness of breath, wheezing or cough within the last 48 hours? No    4. Since your last injection, have you been seen in the Emergency Department or Urgent Care Clinic for your breathing? No    5. Are you taking any Beta Blocker Medications (examples: Metoprolol, Atenolol)?  No    6. Women of Childbearing Age: Since your last allergy injection, have you become pregnant or do you think that you are pregnant?  No    7. Have you been prescribed any antibiotics in the last 5 days? No    8. Have you had a reaction to your last allergy injection? If yes, what was your reaction? No   > Any symptoms other than at the site of injection (Generalized itching, breathing difficulty, redness, hives, swelling, etc.) No    
Patient was given Nucala and observed for 30 minutes with no signs/symptoms of allergic response. Patient was discharged home with epinephrine device in hand.    
: Yes

## 2023-11-14 ENCOUNTER — APPOINTMENT (OUTPATIENT)
Dept: OTOLARYNGOLOGY | Facility: CLINIC | Age: 78
End: 2023-11-14
Payer: MEDICARE

## 2023-11-14 PROCEDURE — 31579 LARYNGOSCOPY TELESCOPIC: CPT

## 2023-11-14 PROCEDURE — 99204 OFFICE O/P NEW MOD 45 MIN: CPT | Mod: 25

## 2023-11-15 ENCOUNTER — RX RENEWAL (OUTPATIENT)
Age: 78
End: 2023-11-15

## 2023-11-15 RX ORDER — MOMETASONE 50 UG/1
50 SPRAY, METERED NASAL
Qty: 51 | Refills: 0 | Status: ACTIVE | COMMUNITY
Start: 2023-11-14 | End: 1900-01-01

## 2023-11-29 ENCOUNTER — APPOINTMENT (OUTPATIENT)
Dept: PULMONOLOGY | Facility: CLINIC | Age: 78
End: 2023-11-29
Payer: MEDICARE

## 2023-11-29 VITALS
TEMPERATURE: 97.2 F | OXYGEN SATURATION: 98 % | BODY MASS INDEX: 24.55 KG/M2 | SYSTOLIC BLOOD PRESSURE: 130 MMHG | HEIGHT: 61 IN | DIASTOLIC BLOOD PRESSURE: 70 MMHG | WEIGHT: 130 LBS | RESPIRATION RATE: 16 BRPM | HEART RATE: 76 BPM

## 2023-11-29 DIAGNOSIS — R05.3 CHRONIC COUGH: ICD-10-CM

## 2023-11-29 PROCEDURE — 95012 NITRIC OXIDE EXP GAS DETER: CPT

## 2023-11-29 PROCEDURE — 99214 OFFICE O/P EST MOD 30 MIN: CPT | Mod: 25

## 2023-11-29 RX ORDER — EPINEPHRINE 0.3 MG/.3ML
0.3 INJECTION INTRAMUSCULAR
Qty: 1 | Refills: 0 | Status: ACTIVE | COMMUNITY
Start: 2023-06-07 | End: 1900-01-01

## 2023-12-12 ENCOUNTER — RESULT REVIEW (OUTPATIENT)
Age: 78
End: 2023-12-12

## 2023-12-12 RX ORDER — FAMOTIDINE 40 MG/1
40 TABLET, FILM COATED ORAL
Qty: 90 | Refills: 3 | Status: ACTIVE | COMMUNITY
Start: 2023-11-14 | End: 1900-01-01

## 2023-12-12 RX ORDER — METRONIDAZOLE 500 MG/1
500 TABLET ORAL 3 TIMES DAILY
Qty: 42 | Refills: 0 | Status: ACTIVE | COMMUNITY
Start: 2023-12-12 | End: 1900-01-01

## 2024-01-11 ENCOUNTER — OUTPATIENT (OUTPATIENT)
Dept: OUTPATIENT SERVICES | Facility: HOSPITAL | Age: 79
LOS: 1 days | Discharge: HOME | End: 2024-01-11

## 2024-01-11 ENCOUNTER — APPOINTMENT (OUTPATIENT)
Dept: SPEECH THERAPY | Facility: HOSPITAL | Age: 79
End: 2024-01-11

## 2024-01-11 ENCOUNTER — OUTPATIENT (OUTPATIENT)
Dept: OUTPATIENT SERVICES | Facility: HOSPITAL | Age: 79
LOS: 1 days | End: 2024-01-11
Payer: MEDICARE

## 2024-01-11 ENCOUNTER — APPOINTMENT (OUTPATIENT)
Dept: RADIOLOGY | Facility: HOSPITAL | Age: 79
End: 2024-01-11

## 2024-01-11 DIAGNOSIS — Z90.49 ACQUIRED ABSENCE OF OTHER SPECIFIED PARTS OF DIGESTIVE TRACT: Chronic | ICD-10-CM

## 2024-01-11 DIAGNOSIS — Z98.41 CATARACT EXTRACTION STATUS, RIGHT EYE: Chronic | ICD-10-CM

## 2024-01-11 DIAGNOSIS — K21.9 GASTRO-ESOPHAGEAL REFLUX DISEASE WITHOUT ESOPHAGITIS: ICD-10-CM

## 2024-01-11 DIAGNOSIS — Z98.890 OTHER SPECIFIED POSTPROCEDURAL STATES: Chronic | ICD-10-CM

## 2024-01-11 PROCEDURE — 74230 X-RAY XM SWLNG FUNCJ C+: CPT | Mod: 26

## 2024-01-11 NOTE — PLAN
[FreeTextEntry2] :  1.) Continue with current diet regimen of Regular and Thin Liquids 2.) Aspiration Precautions 3.) Reflux Precautions 4.) Maintain Good Oral Hygiene Care 5.) Follow up with Physician 6.) Consider Voice Therapy/Evaluation pending ENT's discretion/clearance.  7.) Swallow Therapy to maximize swallow mechanism   SLP provided Patient education regarding preliminary results. Patient verbalized understanding and will follow up with Physician.

## 2024-01-11 NOTE — HISTORY OF PRESENT ILLNESS
[FreeTextEntry1] : Patient arrived to Radiology for an OutPatient Modified Barium Swallow Study. Patient is a 79 y/o female with PMH as per EMR:  Active Problems Abdominal pain (789.00) (R10.9) Abnormal CT scan, chest (793.2) (R93.89) Acid reflux (530.81) (K21.9) Active asthma (493.90) (J45.909) Allergic rhinitis due to allergen (477.9) (J30.9) Asthma (493.90) (J45.909) Asthma, severe persistent, poorly-controlled (493.90) (J45.50) Nunez's esophagus (530.85) (K22.70) Bloating (787.3) (R14.0) Bronchoplasty Change of voice (784.49) (R49.9) Chronic diarrhea (787.91) (K52.9) Cough, persistent (786.2) (R05.3) COVID-19 virus infection (079.89) (U07.1) Diarrhea, unspecified type (787.91) (R19.7) Dysphonia (784.42) (R49.0) 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.83) GERD (gastroesophageal reflux disease) (530.81) (K21.9) History of allergy (V15.09) (Z88.9) Hypertension, unspecified type (401.9) (I10) LPRD (laryngopharyngeal reflux disease) (478.79) (K21.9) SUSAN (obstructive sleep apnea) (327.23) (G47.33) Postop check (V67.00) (Z09) Pre-procedural laboratory examination (V72.63) (Z01.812) Reflux laryngitis (464.00,530.81) (J04.0,K21.9) S/P tracheoplasty (V45.89) (Z98.890) Screening for viral disease (V73.99) (Z11.59) SOB (shortness of breath) (786.05) (R06.02) Tracheobronchomalacia (519.19) (J39.8)     Past Medical History History of Abdominal pain, RLQ (right lower quadrant) (789.03) (R10.31) History of Accident caused by hypodermic needle, subsequent encounter (V58.89) (W46.0XXD) History of Acute bronchitis with asthma with acute exacerbation (466.0,493.92) (J20.9,J45.901) History of Asthmatic bronchitis (493.90) (J45.909) History of Colitis (558.9) (K52.9) History of Collagenous colitis (558.9) (K52.831) History of High cholesterol (272.0) (E78.00) History of abnormal weight loss (V13.89) (Z87.898) History of epigastric pain (V13.89) (Z87.898) History of gastroesophageal reflux (GERD) (V12.79) (Z87.19) History of mycoplasma pneumonia (V12.61) (Z87.01) History of nausea (V12.79) (Z87.898) History of pancreatitis (V12.79) (Z87.19) History of postnasal drip (V13.89) (Z87.898) History of shortness of breath (V13.89) (Z87.898) History of Hospital discharge follow-up (V67.59) (Z09) History of Malaise and fatigue (780.79) (R53.81,R53.83) History of Poor sleep (V69.4) (Z72.820) History of Postop check (V67.00) (Z09) History of Postoperative pain (338.18) (G89.18)   Surgical History History of Appendectomy History of Complete Colonoscopy History of Tracheoplasty   ENT Note 11/14/2023 - 78yF with h/o TBM s/p tracheoplasty 2017, h/o hiatal hernia with reflux, newer more recent return of coughing, PND, and 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 Head of bed elevation, reflux precautions, start famotidine at nighttime and continue ppi in the morning. Start astelin and mometasone in the nose. Saline washes for congestion. MBS to assess for aspiration. Consider injection laryngoplasty. Consider GI referral. RTC 3-4 months unless issues arise sooner. TEB sooner.   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 No current/repeated pneumonia. This Modified Barium Swallow Study is to objectively assess the Physiology of the Oral and Pharyngeal Stage swallowing mechanism for treatment plan for least restrictive diet.   Referring MD: Dr. Chao Mcneil

## 2024-01-11 NOTE — ASSESSMENT
[FreeTextEntry1] : Patient presents with Functional Oral and Mild Pharyngeal Stage swallowing mechanism. The Oral Stage is characterized by adequate oral containment, adequate chewing for solid, adequate bolus manipulation, adequate tongue motion with adequate anterior to posterior transfer of the bolus for puree/solids; with adequate oral clearance.  The Pharyngeal Stage is characterized by adequate initiation of the pharyngeal swallow, adequate laryngeal elevation, adequate tongue base retraction and reduced pharyngeal constriction. There is trace/mild pharyngeal residue (lateral pharyngeal wall) post primary swallow for puree/solids. A spontaneous reswallow clears the trace/mild pharyngeal residue for puree/solids.   There was No Aspiration observed before, during or after the swallow for puree, solids, thin liquids.    RESULTS: No Aspiration observed before, during or after the swallow for puree, solids, thin liquids.   Of Note: Patient was given a Solid, Thin Liquids and a Barium Tablet in Anterior Posterior AP View. An Esophageal Screen was performed. Patient was given a Barium Tablet with a cup of water. The Tablet was observed to course through the pharynx/esophagus without hold up. This cannot be considered as a full complete evaluation of the esophagus.

## 2024-01-11 NOTE — ASSESSMENT
[FreeTextEntry1] : 76 year old female with a PMH of diverticulosis, Nunez's esophagus, asthmatic bronchitis, GERD/LPRD, post nasal drip syndrome, tracheobronchomalacia s/p bronchoscopy, RIGHT video assisted thorascopic surgery, robotic assisted tracheobronchoplasty with Prolene mesh done on 11/1/17 presents today for a follow up visit. \par Patient reports recurrent cough since Feb 2022 after caught a cold. Cough is consistent, productive, yellow or white, mucous like or sticky lumps of phlegm. No improvement with antibiotics, steroid, and inhaler treatment. Per patient, her GERD has been under controlled with PPI and H2 blocker. Her CXR on 02/11/2022 was unremarkable. On physical exam, there're scattered wheezing and crackles b/l lung fields. \par \par Given the patient's history, complaints, and symptoms,  I recommended awake dynamic bronchoscopy and esophagogastroduodenoscopy to assess the stability of her repair and to assess the degree of her hiatal hernia noted on CT scan and reflux sequelae. The implication, risk, benefits, and alternatives were explained to the patient, who understood and agreed to the above. \par \par Plan:\par 1. Awake dynamic bronchoscopy and EGD on 06/10/22\par 2. Preprocedure blood work, EKG done in office today. \par 3. Medical clearance\par 4. COVID PCR testing 3 days prior to the procedure. \par \par VANDANA Fofana, was scribe [and  if needed] for and in the presence of Dr. DHRUV STYLES for the following sections: history of present illness, past medical/surgical/family/social/ allergy history, review of systems, vital signs, physical exam, and disposition.\par \par I, Dhruv Styles,  personally performed the services described in the documentation, reviewed the documentation recorded by the scribe in my presence and it accurately and completely records my words and actions.\par \par \par 
88

## 2024-01-16 ENCOUNTER — NON-APPOINTMENT (OUTPATIENT)
Age: 79
End: 2024-01-16

## 2024-01-17 DIAGNOSIS — R13.13 DYSPHAGIA, PHARYNGEAL PHASE: ICD-10-CM

## 2024-01-18 ENCOUNTER — APPOINTMENT (OUTPATIENT)
Dept: OTOLARYNGOLOGY | Facility: CLINIC | Age: 79
End: 2024-01-18
Payer: MEDICARE

## 2024-01-18 PROCEDURE — 92526 ORAL FUNCTION THERAPY: CPT | Mod: GN

## 2024-01-25 ENCOUNTER — APPOINTMENT (OUTPATIENT)
Dept: OTOLARYNGOLOGY | Facility: CLINIC | Age: 79
End: 2024-01-25

## 2024-02-01 ENCOUNTER — APPOINTMENT (OUTPATIENT)
Dept: OTOLARYNGOLOGY | Facility: CLINIC | Age: 79
End: 2024-02-01

## 2024-02-06 ENCOUNTER — APPOINTMENT (OUTPATIENT)
Dept: OTOLARYNGOLOGY | Facility: CLINIC | Age: 79
End: 2024-02-06
Payer: MEDICARE

## 2024-02-06 ENCOUNTER — APPOINTMENT (OUTPATIENT)
Dept: PULMONOLOGY | Facility: CLINIC | Age: 79
End: 2024-02-06
Payer: MEDICARE

## 2024-02-06 VITALS
HEART RATE: 79 BPM | OXYGEN SATURATION: 98 % | RESPIRATION RATE: 16 BRPM | WEIGHT: 140 LBS | DIASTOLIC BLOOD PRESSURE: 70 MMHG | SYSTOLIC BLOOD PRESSURE: 142 MMHG | TEMPERATURE: 97 F | BODY MASS INDEX: 26.43 KG/M2 | HEIGHT: 61 IN

## 2024-02-06 DIAGNOSIS — J30.9 ALLERGIC RHINITIS, UNSPECIFIED: ICD-10-CM

## 2024-02-06 PROCEDURE — 95012 NITRIC OXIDE EXP GAS DETER: CPT

## 2024-02-06 PROCEDURE — 99214 OFFICE O/P EST MOD 30 MIN: CPT | Mod: 95

## 2024-02-06 PROCEDURE — 99214 OFFICE O/P EST MOD 30 MIN: CPT | Mod: 25

## 2024-02-06 PROCEDURE — 94010 BREATHING CAPACITY TEST: CPT

## 2024-02-06 NOTE — PHYSICAL EXAM
[No Acute Distress] : no acute distress [Normal Oropharynx] : normal oropharynx [III] : Mallampati Class: III [Normal Appearance] : normal appearance [No Neck Mass] : no neck mass [Normal Rate/Rhythm] : normal rate/rhythm [Normal S1, S2] : normal s1, s2 [No Murmurs] : no murmurs [No Resp Distress] : no resp distress [No Abnormalities] : no abnormalities [Benign] : benign [Normal Gait] : normal gait [No Clubbing] : no clubbing [No Cyanosis] : no cyanosis [No Edema] : no edema [FROM] : FROM [Normal Color/ Pigmentation] : normal color/ pigmentation [No Focal Deficits] : no focal deficits [Oriented x3] : oriented x3 [Normal Affect] : normal affect [TextBox_68] : I:E ratio 1:3; mild expiratory wheeze

## 2024-02-06 NOTE — ASSESSMENT
[FreeTextEntry1] : Ms. Phipps is a 78 year old female with a history of recently diverticulosis, GERD, collagenous colitis, severe persistent asthma, allergies, TBM, OSAS; ?PNA complicated by tracheomalacia, s/p tracheoplasty with bronchospasm. - s/p asthmatic type bronchitis and GERD, asthma/ allergy/ LPR (resolved)- Active Asthma (improved) - s/p ENT eval ([facility])- ?reflux-related ([facility] with [facility]) - still symptopatic - ?pending laryngoplasty  Her cough and shortness of breath is related to: -acquired tracheobronchomalacia -asthma - severe persistent -abnormal hypersensitivity panel -post nasal drip syndrome -GERD ([facility])  problem 1: severe persistent asthma-(Active) - NC with Rx- noncompliant ([facility]) -s/p Prednisone 30 mg x 5 days, 20 mg x 5 days, 10 mg x 5 days- 3/2023 -s/p Prednisone 20mg for 7 days then 10mg for 7 days (2/2022) -11/2022 -Information sheet given to the patient to be reviewed, this medication is never to be used without consulting the prescribing physician. Proper dietary restraint is necessary specifically salt containing foods, if any reaction may occur should be reported. -continue Symbicort 160 2 inhalations BID or equivalent  -continue to use Xopenex (.63) via nebulizer Q8H (in the morning) -continue to use Pulmicort 0.5 BID (after Xopenex) -continue to use Singulair 10 mg QHS -Asthma is believed to be caused by inherited (genetic) and environmental factor, but its exact cause is unknown. Asthma may be triggered by allergens, lung infections, or irritants in the air. Asthma triggers are different for each person -Inhaler technique reviewed as well as oral hygiene techniques reviewed with patient. Avoidance of cold air, extremes of temperature, rescue inhaler should be used before exercise. Order of medication reviewed with patient. Recommended use of a cool mist humidifier in the bedroom.  problem 2A: Biologic eval (since 2023) - s/p blood work: asthma profile (-), food IgE panel (-), eosinophil level (330), IgE level (-), Vitamin D level (WNL) -Rx move to [facility] ([place])  Problem 2B: Eosinophilic Asthma  - 7/17/2022 -Dupixent (11/2022, 1/2023) (Cost prohibitive) -The safety and efficacy of Nucala was established in three double-blind , randomized, placebo controlled trials in patients with severe asthma. Compared to a placebo, patients with severe asthma receiving Nucala had a fever exacerbation requiring hospitalization and.or emergency department visits, and a longer time to first exacerbation. In addition, patients with severe asthma receiving Nucala or Fasenra experienced greater reductions in their daily maintenance oral corticosteroid dose,m while maintaining asthma control compared with patients receiving placebo. Treatment with Nucala did not result in a significant improvement in lung function as measured by the volume of air exhaled by patients in one second. The most common side effects include: headache, injection site reactions back pain, weakness and fatigue; hypersensitivity reactions can occur within hours or days including swelling of the face, mouth and tongue, fainting, dizziness, hives, breathing problems and rash; herpes zoster infections have occurred. The drug is a monoclonal antibody that inhibits interleukin-5 which helps regular eosinophils, a type of white blood cell that contributes to asthma. The over-prodcution of eosinophils can cause inflammation in the lungs, increasing the frequency of asthma attacks. Patients must also take other medications, including high dose inhaled corticosteroids and at least one additional asthma drug.  problem 3: sensory neuropathic cough- improved -off Elavil 25 mg up to TID- QHS- on the shelf -continue Baclofen 10mg pre-meal, PRN -continue Hycodan syrup - (ISTOP) -continue Neurontin 100 Q8H -Sensory neuropathic cough is an etiology of cough that is often realized once someone has been ruled out for common disease such as: asthma, COPD, eosinophilic bronchitis, bronchiectasis, post nasal drip, and GERD. It sometimes develops following a URI, herpes zoster outbreak in pharynx or thyroid or cervical spine injury. However, many patients have no identifiable antecedent explanation.  problem 4: tracheobronchomalacia -s/p surgery with Dr. Boyd Ramirez in 11/2017 -s/p bronchoscopy with Dr. Artis Tracheomalacia is usually acquired in adults and common causes include damage by tracheostomy or endotracheal intubation damaging the tracheal cartilage with increase risk with multiple intubations, prolonged intubation, and concurrent high dose steroid therapy; external chest wall trauma and surgery; chronic compression of the trachea by benign etiologies (eg, benign mediastinal goiter) or malignancy; relapsing polychondritis; or recurrent infection. Tracheomalacia can be asymptomatic, however signs or symptoms can develop as the severity of the airway narrowing progresses with major symptoms include dyspnea, cough, and sputum retention. Other symptoms include severe paroxysms of coughing, wheezing or stridor, barking cough and may be exacerbated by forced expiration, cough, and Valsalva maneuver. Tracheomalacia is diagnosed by a bronchoscopic visualization of dynamic airway collapse on dynamic chest CT. Therapy is warranted in symptomatic patients with severe tracheomalacia and includes surgical repair as tracheobronchoplasty. The patient was referred to Dr. Artis/ Dr. eKlley, at [facility] for a surgical consult.  problem 5: abnormal hypersensitivity panel/allergic panel -s/p allergist evaluation - no treatment recommended at this time -Environmental measures for allergies were encouraged including mattress and pillow cover, air purifier, and environmental controls.  Problem 6: s/p mycoplasma pneumonia -s/p prolonged Zithromax for anti inflammatory  problem 7: overweight (resolved) -Weight loss, exercise, and diet control were discussed and are highly encouraged. Treatment options were given such as, aqua therapy, and contacting a nutritionist. Recommended to use the elliptical, stationary bike, less use of treadmill. Obesity is associated with worsening asthma, shortness of breath, and potential for cardiac disease, diabetes, and other underlying medical conditions.  problem 8: GERD/LPR - active -continue Baclofen 10mg pre-meal - noncompliant -continue to use Protonix 40 mg before breakfast -continue Pepcid 40 mg QHS -recommended [employer] Probiotic supplement -Recommended to chew on DGL (diglyceride licorice root) before breakfast and dinner -Recommended to take Slippery Elm or drink Slippery Elm Tea (Throat Coat Tea)  -Rule of 2s: avoid eating too much, eating too late, eating too spicy, eating two hours before bed -Things to avoid including overeating, spicy foods, tight clothing, eating within three hours of bed, this list is not all inclusive. -For treatment of reflux, possible options discussed including diet control, H2 blockers, PPIs, as well as coating motility agents discussed as treatment options. Timing of meals and proximity of last meal to sleep were discussed. If symptoms persist, a formal gastrointestinal evaluation is needed.  problem 9: dysphonia -recommended to use Fisherman's Friend Lozenges - ?laryngoplasty pending  -felt to be related to cough causing vocal cord trauma, LPR, medication effect -recommended hydration and time  problem 10: allergic rhinitis -recommended xlear saline -recommended OTC antihistamine PRN (Claritin 10 mg QAM) -recommended Allergist evaluation (work up negative 2022) -s/p ENT formal eval with Jake Mcneil -MBS pending -Environmental measures for allergies were encouraged including mattress and pillow cover, air purifier, and environmental controls.  problem 11: SUSAN -continue Rozerem 8mg qhs -recommended to use "Chin Strap" -recommended to take nocturnal medications earlier; OxyAid -recommended to have a mouth piece made - denied; failed CPAP -based on her Nunez's esophagus, EDS, ? snoring, elevated mallampati class, and poor sleep; she is being set up for an at home sleep study  Sleep apnea is associated with adverse clinical consequences which an affect most organ systems. Cardiovascular disease risk includes arrhythmias, atrial fibrillation, hypertension, coronary artery disease, and stroke. Metabolic disorders include diabetes type 2, non-alcoholic fatty liver disease. Mood disorder especially depression; and cognitive decline especially in the elderly. Associations with chronic reflux/Nunez's esophagus some but not all inclusive. -Reasons include arousal consistent with hypopnea; respiratory events most prominent in REM sleep or supine position; therefore sleep staging and body position are important for accurate diagnosis and estimation of AHI. -Good sleep hygiene was encouraged including avoiding watching television an hour before bed, keeping caffeine at a low, avoiding reading, television, or anything, in bed, no drinking any liquids three hours before bedtime, and only getting into bed when tired and ready for sleep.  Problem 12: Health Maintenance/COVID19 Precautions -VACCINE HESITANT -Covid 19 precaution, vaccine and booster discussed at length and recommended -educated patient on COVID 19 vaccine and appropriate recommendations (hesitant due to allergies) - Clean your hands often. Wash your hands often with soap and water for at least 20 seconds, especially after blowing your nose, coughing, or sneezing, or having been in a public place. - If soap and water are not available, use a hand  that contains at least 60% alcohol. - To the extent possible, avoid touching high-touch surfaces in public places - elevator buttons, door handles, handrails, handshaking with people, etc. Use a tissue or your sleeve to cover your hand or finger if you must touch something. - Wash your hands after touching surfaces in public places. - Avoid touching your face, nose, eyes, etc. - Clean and disinfect your home to remove germs: practice routine cleaning of frequently touched surfaces (for example: tables, doorknobs, light switches, handles, desks, toilets, faucets, sinks & cell phones) - Avoid crowds, especially in poorly ventilated spaces. Your risk of exposure to respiratory viruses like COVID-19 may increase in crowded, closed-in settings with little air circulation if there are people in the crowd who are sick. All patients are recommended to practice social distancing and stay at least 6 feet away from others. - Avoid all non-essential travel including plane trips, and especially avoid embarking on cruise ships. -If COVID-19 is spreading in your community, take extra measures to put distance between yourself and other people to further reduce your risk of being exposed to this new virus. -Stay home as much as possible. - Consider ways of getting food brought to your house through family, social, or commercial networks -Be aware that the virus has been known to live in the air up to 3 hours post exposure, cardboard up to 24 hours post exposure, copper up to 4 hours post exposure, steel and plastic up to 2-3 days post exposure. Risk of transmission from these surfaces are affected by many variables. COVID-19 precautionary Immune Support Recommendations: -OTC Vitamin C 500mg BID -OTC Quercetin 250-500mg BID -OTC Zinc 75-100mg per day -OTC Melatonin 1 or 2mg a night -OTC Vitamin D 1-4000mg per day -OTC Tonic Water 8oz per day -Water 0.5-1 gallon per day Asthma and COVID19: You need to make sure your asthma is under control. This often requires the use of inhaled corticosteroids (and sometimes oral corticosteroids). Inhaled corticosteroids do not likely reduce your immune system's ability to fight infections, but oral corticosteroids may. It is important to use the steps above to protect yourself to limit your exposure to any respiratory virus.  problem 13: health maintenance -recommended RSV vaccine in the Fall for anyone over the age of 60 -recommended a yearly flu shot after October 15 2018 (refused 2020) -recommended strep pneumonia vaccines: Prevnar-13 vaccine, followed by Pneumo vaccine 23 on year following (refused) -recommended early intervention for URIs -recommended osteoporosis evaluations -recommended early dermatological evaluations -recommended after the age of 50 to the age of 70, colonoscopy every 5 years  F/P in 3-4 months She is encouraged to call with any changes, concerns, or questions.

## 2024-02-06 NOTE — HISTORY OF PRESENT ILLNESS
[FreeTextEntry1] : Ms. Phipps is a 78 year old female with a history of allergic rhinitis, Nunez's esophagus, bronchoplasty/tracheoplasty, persistent cough, GERD, LPRD, SUSAN, TBM, and SOB presenting to the office today for a follow-up pulmonary evaluation. Her chief complaint is   -she notes a cough -she notes going for a swallow study and was told to see a speech therapist -she notes not getting enough sleep  -she notes her sleep is interrupted (due to cough) -she notes 2-3 hours of uninterrupted sleep at a time -she notes that she has gained weight (on purpose)  -patient denies any headaches, nausea, vomiting, fever, chills, sweats, palpitations, diarrhea, constipation, dysphagia, myalgias, dizziness, leg swelling, leg pain, itchy eyes, itchy ears, heartburn, reflux or sour taste in the mouth

## 2024-02-06 NOTE — PROCEDURE
[FreeTextEntry1] : PFT revealed normal flows, with a FEV1 of 1.36L, which is 78.7% of predicted, with a normal flow volume loop. -PFTs performed today for evaluation of SOB (02/06/2024)   Feno was 81; a normal value being less than 25. The American Thoracic Society (ATS) strongly recommends the use of FeNO measurement to aid in the assessment, management, and long-term monitoring of asthma. In their 2011 clinical practice guideline, the ATS emphasizes the importance of using FeNO.

## 2024-02-06 NOTE — ADDENDUM
[FreeTextEntry1] :  Documented by Henry Dyer acting as a scribe for Dr. Denzel Saunders on 02/6/2024.   All medical record entries made by the Scribe were at my, Dr. Denzel Saunders's, direction and personally dictated by me on 2/6/2024. I have reviewed the chart and agree that the record accurately reflects my personal performance of the history, physical exam, assessment and plan. I have also personally directed, reviewed, and agree with the discharge instructions.

## 2024-02-07 NOTE — REASON FOR VISIT
[Home] : at home, [unfilled] , at the time of the visit. [Medical Office: ([facility])___] : at the medical office located in  [Patient] : the patient [Subsequent Evaluation] : a subsequent evaluation for [FreeTextEntry2] : dysphonia

## 2024-02-07 NOTE — CONSULT LETTER
[Dear  ___] : Dear  [unfilled], [FreeTextEntry2] : Dear Dr. Christopher Henry [FreeTextEntry3] :  Chao Mcneil MD, PhD  Chief, Division of Laryngology  Department of Otolaryngology  [facility]  Pediatric Otolaryngology, [facility]   of Otolaryngology  [facility]

## 2024-02-13 ENCOUNTER — APPOINTMENT (OUTPATIENT)
Dept: PULMONOLOGY | Facility: CLINIC | Age: 79
End: 2024-02-13

## 2024-02-21 ENCOUNTER — APPOINTMENT (OUTPATIENT)
Dept: OTOLARYNGOLOGY | Facility: CLINIC | Age: 79
End: 2024-02-21
Payer: MEDICARE

## 2024-02-21 VITALS
WEIGHT: 140 LBS | HEIGHT: 61 IN | SYSTOLIC BLOOD PRESSURE: 152 MMHG | BODY MASS INDEX: 26.43 KG/M2 | TEMPERATURE: 98.2 F | OXYGEN SATURATION: 97 % | HEART RATE: 69 BPM | DIASTOLIC BLOOD PRESSURE: 82 MMHG

## 2024-02-21 PROCEDURE — 31573 LARGSC W/THER INJECTION: CPT | Mod: 50

## 2024-02-21 PROCEDURE — 99213 OFFICE O/P EST LOW 20 MIN: CPT | Mod: 25

## 2024-02-28 ENCOUNTER — APPOINTMENT (OUTPATIENT)
Dept: PULMONOLOGY | Facility: CLINIC | Age: 79
End: 2024-02-28

## 2024-04-03 ENCOUNTER — APPOINTMENT (OUTPATIENT)
Dept: OTOLARYNGOLOGY | Facility: CLINIC | Age: 79
End: 2024-04-03
Payer: MEDICARE

## 2024-04-03 PROCEDURE — 99214 OFFICE O/P EST MOD 30 MIN: CPT | Mod: 25

## 2024-04-03 PROCEDURE — 31573 LARGSC W/THER INJECTION: CPT | Mod: 50

## 2024-04-03 NOTE — HISTORY OF PRESENT ILLNESS
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 History of tracheobronchomalacia, Nunez's esophagus. s/p bronchoplasty/tracheoplasty (Dr. Matt Ramirez in 2017). 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 Last seen 2/21/24 at which tie SLN injections performed. Reports significant improvement of cough with SLN infections - cough mostly resolved.  Minimal discomfort afterwards. No bleeding, dyspnea or issues with swallowing.  States voice becomes hoarse intermittently.  Occasional throat clearing sensation - states possible postnasal drip.  Taking Pantoprazole daily and Famotidine at night PRN.

## 2024-04-03 NOTE — CONSULT LETTER
[Dear  ___] : Dear  [unfilled], [Consult Letter:] : I had the pleasure of evaluating your patient, [unfilled]. [Please see my note below.] : Please see my note below. [Consult Closing:] : Thank you very much for allowing me to participate in the care of this patient.  If you have any questions, please do not hesitate to contact me. [Sincerely,] : Sincerely, [FreeTextEntry2] : Dr. Henry Christopher [FreeTextEntry3] : Chao Mcneil MD, PhD Chief, Division of Laryngology Department of Otolaryngology [facility] Pediatric Otolaryngology, [facility]  of Otolaryngology [facility]

## 2024-05-25 ENCOUNTER — RX RENEWAL (OUTPATIENT)
Age: 79
End: 2024-05-25

## 2024-05-30 RX ORDER — BUDESONIDE AND FORMOTEROL FUMARATE DIHYDRATE 160; 4.5 UG/1; UG/1
160-4.5 AEROSOL RESPIRATORY (INHALATION) TWICE DAILY
Qty: 3 | Refills: 1 | Status: ACTIVE | COMMUNITY
Start: 2024-02-06 | End: 1900-01-01

## 2024-06-10 DIAGNOSIS — R10.9 UNSPECIFIED ABDOMINAL PAIN: ICD-10-CM

## 2024-06-11 ENCOUNTER — APPOINTMENT (OUTPATIENT)
Dept: CT IMAGING | Facility: IMAGING CENTER | Age: 79
End: 2024-06-11

## 2024-06-11 LAB
ALBUMIN SERPL ELPH-MCNC: 4.5 G/DL
ALP BLD-CCNC: 67 U/L
ALT SERPL-CCNC: 27 U/L
AMYLASE/CREAT SERPL: 41 U/L
ANION GAP SERPL CALC-SCNC: 12 MMOL/L
AST SERPL-CCNC: 21 U/L
BILIRUB SERPL-MCNC: 0.3 MG/DL
BUN SERPL-MCNC: 16 MG/DL
CALCIUM SERPL-MCNC: 9.7 MG/DL
CHLORIDE SERPL-SCNC: 104 MMOL/L
CO2 SERPL-SCNC: 24 MMOL/L
CREAT SERPL-MCNC: 0.81 MG/DL
EGFR: 74 ML/MIN/1.73M2
HCT VFR BLD CALC: 39.5 %
HGB BLD-MCNC: 13.2 G/DL
LPL SERPL-CCNC: 37 U/L
MCHC RBC-ENTMCNC: 31.8 PG
MCHC RBC-ENTMCNC: 33.4 GM/DL
MCV RBC AUTO: 95.2 FL
PLATELET # BLD AUTO: 335 K/UL
POTASSIUM SERPL-SCNC: 4.4 MMOL/L
PROT SERPL-MCNC: 7.1 G/DL
RBC # BLD: 4.15 M/UL
RBC # FLD: 15.1 %
SODIUM SERPL-SCNC: 140 MMOL/L
WBC # FLD AUTO: 9.18 K/UL

## 2024-06-13 ENCOUNTER — APPOINTMENT (OUTPATIENT)
Dept: PULMONOLOGY | Facility: CLINIC | Age: 79
End: 2024-06-13
Payer: MEDICARE

## 2024-06-13 VITALS
SYSTOLIC BLOOD PRESSURE: 136 MMHG | DIASTOLIC BLOOD PRESSURE: 72 MMHG | OXYGEN SATURATION: 98 % | WEIGHT: 140 LBS | TEMPERATURE: 97.6 F | BODY MASS INDEX: 26.43 KG/M2 | HEIGHT: 61 IN | HEART RATE: 90 BPM | RESPIRATION RATE: 16 BRPM

## 2024-06-13 DIAGNOSIS — J45.50 SEVERE PERSISTENT ASTHMA, UNCOMPLICATED: ICD-10-CM

## 2024-06-13 DIAGNOSIS — K21.9 GASTRO-ESOPHAGEAL REFLUX DISEASE W/OUT ESOPHAGITIS: ICD-10-CM

## 2024-06-13 DIAGNOSIS — R93.89 ABNORMAL FINDINGS ON DIAGNOSTIC IMAGING OF OTHER SPECIFIED BODY STRUCTURES: ICD-10-CM

## 2024-06-13 DIAGNOSIS — R06.02 SHORTNESS OF BREATH: ICD-10-CM

## 2024-06-13 DIAGNOSIS — J39.8 OTHER SPECIFIED DISEASES OF UPPER RESPIRATORY TRACT: ICD-10-CM

## 2024-06-13 DIAGNOSIS — G47.33 OBSTRUCTIVE SLEEP APNEA (ADULT) (PEDIATRIC): ICD-10-CM

## 2024-06-13 DIAGNOSIS — J82.83 EOSINOPHILIC ASTHMA: ICD-10-CM

## 2024-06-13 PROCEDURE — 95012 NITRIC OXIDE EXP GAS DETER: CPT

## 2024-06-13 PROCEDURE — 94010 BREATHING CAPACITY TEST: CPT

## 2024-06-13 PROCEDURE — 99214 OFFICE O/P EST MOD 30 MIN: CPT | Mod: 25

## 2024-06-13 NOTE — ADDENDUM
[FreeTextEntry1] :  Documented by Chepe Martin acting as a scribe for Dr. Denzel Saunders on 06/13/2024 .   All medical record entries made by the Scribe were at my, Dr. Denzel Saunders's direction and personally dictated by me on 06/13/2024 . I have reviewed the chart and agree that the record accurately reflects my personal performance of the history, Physical exam, assessment, and plan. I have also personally directed, reviewed, and agree with the discharge instructions.

## 2024-06-13 NOTE — HISTORY OF PRESENT ILLNESS
[FreeTextEntry1] : Ms. Phipps is a 78-year-old female with a history of allergic rhinitis, Nunez's esophagus, bronchoplasty/tracheoplasty, persistent cough, GERD, LPRD, SUSAN, TBM, and SOB presenting to the office today for a follow-up pulmonary evaluation. Her chief complaint is   -she notes she went to Dr. Mcneil and found a wound in her vocal cords and gave her a shot -she notes she had abdomen pain that is improving, she wanted a CT scan, but her insurance did not approve it  she notes Her bowels are regular... and is bloated she notes sleep is not enough due to pain in stomach and is unsure of whether its gas or possible Kidney stones -she notes her stool is regular -she notes when she breaths more deeply, her stomach hurts more  she notes that her appetite is good..    -Patient denies any headaches, nausea, vomiting, fever, chills, sweats, chest pain, chest pressure, palpitations, coughing, wheezing, fatigue, diarrhea, constipation, dysphagia, arthralgias, dizziness, leg swelling, leg pain, itchy eyes, itchy ears, dysphonia, heartburn, reflux or sour taste in mouth.

## 2024-06-13 NOTE — PROCEDURE
[FreeTextEntry1] : PFT reveals normal flows; FEV1 was 1.93 L which is 111.5 % of predicted with a normal flow volume loop. PFT's for performed to evaluate for Asthma.    FENO was 20; a normal value being less than 25. Fractional exhaled nitric oxide (FENO) is regarded as a simple, noninvasive method for assessing eosinophilic airway inflammation. Produced by a variety of cells within the lung, nitric oxide (NO) concentrations are generally low in healthy individuals. However, high concentrations of NO appear to be involved in nonspecific host defense mechanisms and chronic inflammatory diseases such as asthma. The American Thoracic Society (ATS) therefore has strongly recommended using FENO to aid in the assessment, management, and long-term monitoring of eosinophilic airway inflammation and asthma, and for identifying steroid responsive individuals whose chronic respiratory symptoms may be caused by airway inflammation. In their 2011 clinical practice guideline, the ATS emphasizes the importance of using FENO.

## 2024-06-13 NOTE — ASSESSMENT
[FreeTextEntry1] : Ms. Phipps is a 78 year old female with a history of recently diverticulosis, GERD, collagenous colitis, severe persistent asthma, allergies, TBM, OSAS; ?PNA complicated by tracheomalacia, s/p tracheoplasty with bronchospasm. - s/p asthmatic type bronchitis and GERD, asthma/ allergy/ LPR (resolved)- Active Asthma (improved) - s/p ENT eval ([facility])- ?reflux-related ([facility] with [facility]) - still symptopatic - ? s/p laryngoplasty (secreted) - thriving  Her cough and shortness of breath is related to: -acquired tracheobronchomalacia -asthma - severe persistent -abnormal hypersensitivity panel -post nasal drip syndrome -GERD ([place])  problem 1: severe persistent asthma-(Active) - NC with Rx- noncompliant ([facility]) -s/p Prednisone 30 mg x 5 days, 20 mg x 5 days, 10 mg x 5 days- 3/2023 -s/p Prednisone 20mg for 7 days then 10mg for 7 days (2/2022) -11/2022 -Information sheet given to the patient to be reviewed, this medication is never to be used without consulting the prescribing physician. Proper dietary restraint is necessary specifically salt containing foods, if any reaction may occur should be reported. -continue Symbicort 160 2 inhalations BID or equivalent  -continue to use Xopenex (.63) via nebulizer Q8H (in the morning) -continue to use Pulmicort 0.5 BID (after Xopenex) -continue to use Singulair 10 mg QHS -Asthma is believed to be caused by inherited (genetic) and environmental factor, but its exact cause is unknown. Asthma may be triggered by allergens, lung infections, or irritants in the air. Asthma triggers are different for each person -Inhaler technique reviewed as well as oral hygiene techniques reviewed with patient. Avoidance of cold air, extremes of temperature, rescue inhaler should be used before exercise. Order of medication reviewed with patient. Recommended use of a cool mist humidifier in the bedroom.  problem 2A: Biologic eval (since 2023) - s/p blood work: asthma profile (-), food IgE panel (-), eosinophil level (330), IgE level (-), Vitamin D level (WNL) -Rx move to [facility] ([facility])  Problem 2B: Eosinophilic Asthma  - 7/17/2022 -Dupixent (11/2022, 1/2023) (Cost prohibitive) -The safety and efficacy of Nucala was established in three double-blind , randomized, placebo controlled trials in patients with severe asthma. Compared to a placebo, patients with severe asthma receiving Nucala had a fever exacerbation requiring hospitalization and.or emergency department visits, and a longer time to first exacerbation. In addition, patients with severe asthma receiving Nucala or Fasenra experienced greater reductions in their daily maintenance oral corticosteroid dose,m while maintaining asthma control compared with patients receiving placebo. Treatment with Nucala did not result in a significant improvement in lung function as measured by the volume of air exhaled by patients in one second. The most common side effects include: headache, injection site reactions back pain, weakness and fatigue; hypersensitivity reactions can occur within hours or days including swelling of the face, mouth and tongue, fainting, dizziness, hives, breathing problems and rash; herpes zoster infections have occurred. The drug is a monoclonal antibody that inhibits interleukin-5 which helps regular eosinophils, a type of white blood cell that contributes to asthma. The over-prodcution of eosinophils can cause inflammation in the lungs, increasing the frequency of asthma attacks. Patients must also take other medications, including high dose inhaled corticosteroids and at least one additional asthma drug.  problem 3: sensory neuropathic cough- improved -off Elavil 25 mg up to TID- QHS- on the shelf -continue Baclofen 10mg pre-meal, PRN -continue Hycodan syrup - (ISTOP) -continue Neurontin 100 Q8H -Sensory neuropathic cough is an etiology of cough that is often realized once someone has been ruled out for common disease such as: asthma, COPD, eosinophilic bronchitis, bronchiectasis, post nasal drip, and GERD. It sometimes develops following a URI, herpes zoster outbreak in pharynx or thyroid or cervical spine injury. However, many patients have no identifiable antecedent explanation.  problem 4: tracheobronchomalacia -s/p surgery with Dr. Boyd Ramirez in 11/2017 -s/p bronchoscopy with Dr. Artis Tracheomalacia is usually acquired in adults and common causes include damage by tracheostomy or endotracheal intubation damaging the tracheal cartilage with increase risk with multiple intubations, prolonged intubation, and concurrent high dose steroid therapy; external chest wall trauma and surgery; chronic compression of the trachea by benign etiologies (eg, benign mediastinal goiter) or malignancy; relapsing polychondritis; or recurrent infection. Tracheomalacia can be asymptomatic, however signs or symptoms can develop as the severity of the airway narrowing progresses with major symptoms include dyspnea, cough, and sputum retention. Other symptoms include severe paroxysms of coughing, wheezing or stridor, barking cough and may be exacerbated by forced expiration, cough, and Valsalva maneuver. Tracheomalacia is diagnosed by a bronchoscopic visualization of dynamic airway collapse on dynamic chest CT. Therapy is warranted in symptomatic patients with severe tracheomalacia and includes surgical repair as tracheobronchoplasty. The patient was referred to Dr. Artis/ Dr. Kelley, at [facility] for a surgical consult.  problem 5: abnormal hypersensitivity panel/allergic panel -s/p allergist evaluation - no treatment recommended at this time -Environmental measures for allergies were encouraged including mattress and pillow cover, air purifier, and environmental controls.  Problem 6: s/p mycoplasma pneumonia -s/p prolonged Zithromax for anti inflammatory  problem 7: overweight (resolved) -Weight loss, exercise, and diet control were discussed and are highly encouraged. Treatment options were given such as, aqua therapy, and contacting a nutritionist. Recommended to use the elliptical, stationary bike, less use of treadmill. Obesity is associated with worsening asthma, shortness of breath, and potential for cardiac disease, diabetes, and other underlying medical conditions.  problem 8: GERD/LPR - active ([place]) -continue Baclofen 10mg pre-meal - noncompliant -continue to use Protonix 40 mg before breakfast -continue Pepcid 40 mg QHS -recommended [employer] Probiotic supplement / Digestive Enzyme -Recommended to chew on DGL (diglyceride licorice root) before breakfast and dinner -Recommended to take Slippery Elm or drink Slippery Elm Tea (Throat Coat Tea)  -Rule of 2s: avoid eating too much, eating too late, eating too spicy, eating two hours before bed -Things to avoid including overeating, spicy foods, tight clothing, eating within three hours of bed, this list is not all inclusive. -For treatment of reflux, possible options discussed including diet control, H2 blockers, PPIs, as well as coating motility agents discussed as treatment options. Timing of meals and proximity of last meal to sleep were discussed. If symptoms persist, a formal gastrointestinal evaluation is needed.  problem 9: dysphonia -recommended to use Fisherman's Friend Lozenges - ?s/p laryngoplasty ([facility])  -felt to be related to cough causing vocal cord trauma, LPR, medication effect -recommended hydration and time  problem 10: allergic rhinitis -recommended xlear saline -recommended OTC antihistamine PRN (Claritin 10 mg QAM) -recommended Allergist evaluation (work up negative 2022) -s/p ENT formal eval with Jake Mcneil -MBS pending -Environmental measures for allergies were encouraged including mattress and pillow cover, air purifier, and environmental controls.  problem 11: SUSAN -continue Rozerem 8mg qhs -recommended to use "Chin Strap" -recommended to take nocturnal medications earlier; OxyAid -recommended to have a mouth piece made - denied; failed CPAP -based on her Nunez's esophagus, EDS, ? snoring, elevated mallampati class, and poor sleep; she is being set up for an at home sleep study  Sleep apnea is associated with adverse clinical consequences which an affect most organ systems. Cardiovascular disease risk includes arrhythmias, atrial fibrillation, hypertension, coronary artery disease, and stroke. Metabolic disorders include diabetes type 2, non-alcoholic fatty liver disease. Mood disorder especially depression; and cognitive decline especially in the elderly. Associations with chronic reflux/Nunez's esophagus some but not all inclusive. -Reasons include arousal consistent with hypopnea; respiratory events most prominent in REM sleep or supine position; therefore sleep staging and body position are important for accurate diagnosis and estimation of AHI. -Good sleep hygiene was encouraged including avoiding watching television an hour before bed, keeping caffeine at a low, avoiding reading, television, or anything, in bed, no drinking any liquids three hours before bedtime, and only getting into bed when tired and ready for sleep.  Problem 12: Health Maintenance/COVID19 Precautions -VACCINE HESITANT -Covid 19 precaution, vaccine and booster discussed at length and recommended -educated patient on COVID 19 vaccine and appropriate recommendations (hesitant due to allergies) - Clean your hands often. Wash your hands often with soap and water for at least 20 seconds, especially after blowing your nose, coughing, or sneezing, or having been in a public place. - If soap and water are not available, use a hand  that contains at least 60% alcohol. - To the extent possible, avoid touching high-touch surfaces in public places - elevator buttons, door handles, handrails, handshaking with people, etc. Use a tissue or your sleeve to cover your hand or finger if you must touch something. - Wash your hands after touching surfaces in public places. - Avoid touching your face, nose, eyes, etc. - Clean and disinfect your home to remove germs: practice routine cleaning of frequently touched surfaces (for example: tables, doorknobs, light switches, handles, desks, toilets, faucets, sinks & cell phones) - Avoid crowds, especially in poorly ventilated spaces. Your risk of exposure to respiratory viruses like COVID-19 may increase in crowded, closed-in settings with little air circulation if there are people in the crowd who are sick. All patients are recommended to practice social distancing and stay at least 6 feet away from others. - Avoid all non-essential travel including plane trips, and especially avoid embarking on cruise ships. -If COVID-19 is spreading in your community, take extra measures to put distance between yourself and other people to further reduce your risk of being exposed to this new virus. -Stay home as much as possible. - Consider ways of getting food brought to your house through family, social, or commercial networks -Be aware that the virus has been known to live in the air up to 3 hours post exposure, cardboard up to 24 hours post exposure, copper up to 4 hours post exposure, steel and plastic up to 2-3 days post exposure. Risk of transmission from these surfaces are affected by many variables. COVID-19 precautionary Immune Support Recommendations: -OTC Vitamin C 500mg BID -OTC Quercetin 250-500mg BID -OTC Zinc 75-100mg per day -OTC Melatonin 1 or 2mg a night -OTC Vitamin D 1-4000mg per day -OTC Tonic Water 8oz per day -Water 0.5-1 gallon per day Asthma and COVID19: You need to make sure your asthma is under control. This often requires the use of inhaled corticosteroids (and sometimes oral corticosteroids). Inhaled corticosteroids do not likely reduce your immune system's ability to fight infections, but oral corticosteroids may. It is important to use the steps above to protect yourself to limit your exposure to any respiratory virus.  problem 13: health maintenance -recommend PEMF device -recommended RSV vaccine in the Fall for anyone over the age of 60 -recommended a yearly flu shot after October 15 2018 (refused 2020) -recommended strep pneumonia vaccines: Prevnar-13 vaccine, followed by Pneumo vaccine 23 on year following (refused) -recommended early intervention for URIs -recommended osteoporosis evaluations -recommended early dermatological evaluations -recommended after the age of 50 to the age of 70, colonoscopy every 5 years  F/P in 3-4 months She is encouraged to call with any changes, concerns, or questions.

## 2024-06-13 NOTE — PHYSICAL EXAM
[No Acute Distress] : no acute distress [Normal Oropharynx] : normal oropharynx [III] : Mallampati Class: III [Normal Appearance] : normal appearance [No Neck Mass] : no neck mass [Normal Rate/Rhythm] : normal rate/rhythm [Normal S1, S2] : normal s1, s2 [No Murmurs] : no murmurs [No Resp Distress] : no resp distress [No Abnormalities] : no abnormalities [Benign] : benign [Normal Gait] : normal gait [No Clubbing] : no clubbing [No Cyanosis] : no cyanosis [No Edema] : no edema [FROM] : FROM [Normal Color/ Pigmentation] : normal color/ pigmentation [No Focal Deficits] : no focal deficits [Oriented x3] : oriented x3 [Normal Affect] : normal affect [Clear to Auscultation Bilaterally] : clear to auscultation bilaterally [TextBox_68] : I:E ratio 1:3; clear

## 2024-06-15 ENCOUNTER — RX RENEWAL (OUTPATIENT)
Age: 79
End: 2024-06-15

## 2024-06-15 RX ORDER — PANTOPRAZOLE 40 MG/1
40 TABLET, DELAYED RELEASE ORAL
Qty: 90 | Refills: 1 | Status: ACTIVE | COMMUNITY
Start: 2018-09-05 | End: 1900-01-01

## 2024-06-18 ENCOUNTER — APPOINTMENT (OUTPATIENT)
Dept: CT IMAGING | Facility: IMAGING CENTER | Age: 79
End: 2024-06-18
Payer: MEDICARE

## 2024-06-18 ENCOUNTER — OUTPATIENT (OUTPATIENT)
Dept: OUTPATIENT SERVICES | Facility: HOSPITAL | Age: 79
LOS: 1 days | End: 2024-06-18
Payer: MEDICARE

## 2024-06-18 DIAGNOSIS — Z98.41 CATARACT EXTRACTION STATUS, RIGHT EYE: Chronic | ICD-10-CM

## 2024-06-18 DIAGNOSIS — R10.9 UNSPECIFIED ABDOMINAL PAIN: ICD-10-CM

## 2024-06-18 DIAGNOSIS — Z98.890 OTHER SPECIFIED POSTPROCEDURAL STATES: Chronic | ICD-10-CM

## 2024-06-18 DIAGNOSIS — Z90.49 ACQUIRED ABSENCE OF OTHER SPECIFIED PARTS OF DIGESTIVE TRACT: Chronic | ICD-10-CM

## 2024-06-18 PROCEDURE — 74177 CT ABD & PELVIS W/CONTRAST: CPT

## 2024-06-18 PROCEDURE — 74177 CT ABD & PELVIS W/CONTRAST: CPT | Mod: 26

## 2024-06-19 ENCOUNTER — NON-APPOINTMENT (OUTPATIENT)
Age: 79
End: 2024-06-19

## 2024-06-26 ENCOUNTER — APPOINTMENT (OUTPATIENT)
Dept: OTOLARYNGOLOGY | Facility: CLINIC | Age: 79
End: 2024-06-26
Payer: MEDICARE

## 2024-06-26 VITALS — OXYGEN SATURATION: 98 % | HEART RATE: 69 BPM | DIASTOLIC BLOOD PRESSURE: 72 MMHG | SYSTOLIC BLOOD PRESSURE: 152 MMHG

## 2024-06-26 PROCEDURE — 99214 OFFICE O/P EST MOD 30 MIN: CPT | Mod: 25

## 2024-06-26 PROCEDURE — 31579 LARYNGOSCOPY TELESCOPIC: CPT

## 2024-06-26 RX ORDER — IPRATROPIUM BROMIDE 42 UG/1
0.06 SPRAY NASAL
Qty: 15 | Refills: 0 | Status: ACTIVE | COMMUNITY
Start: 2022-06-24 | End: 1900-01-01

## 2024-06-26 RX ORDER — AZELASTINE HYDROCHLORIDE 137 UG/1
137 SPRAY, METERED NASAL
Qty: 1 | Refills: 2 | Status: ACTIVE | COMMUNITY
Start: 2023-11-14 | End: 1900-01-01

## 2024-07-23 ENCOUNTER — APPOINTMENT (OUTPATIENT)
Dept: GASTROENTEROLOGY | Facility: CLINIC | Age: 79
End: 2024-07-23
Payer: MEDICARE

## 2024-07-23 VITALS
WEIGHT: 138 LBS | HEART RATE: 78 BPM | OXYGEN SATURATION: 95 % | BODY MASS INDEX: 26.06 KG/M2 | SYSTOLIC BLOOD PRESSURE: 139 MMHG | DIASTOLIC BLOOD PRESSURE: 82 MMHG | HEIGHT: 61 IN

## 2024-07-23 DIAGNOSIS — K21.9 GASTRO-ESOPHAGEAL REFLUX DISEASE W/OUT ESOPHAGITIS: ICD-10-CM

## 2024-07-23 DIAGNOSIS — K59.09 OTHER CONSTIPATION: ICD-10-CM

## 2024-07-23 PROCEDURE — 99213 OFFICE O/P EST LOW 20 MIN: CPT

## 2024-07-23 PROCEDURE — G2211 COMPLEX E/M VISIT ADD ON: CPT

## 2024-07-23 RX ORDER — LINACLOTIDE 72 UG/1
72 CAPSULE, GELATIN COATED ORAL
Qty: 30 | Refills: 5 | Status: ACTIVE | COMMUNITY
Start: 2024-07-23 | End: 1900-01-01

## 2024-07-23 NOTE — ASSESSMENT
[FreeTextEntry1] : Reflux Constipation Pantoprazole and famotidine renewed Therapeutic trial Linzess

## 2024-07-23 NOTE — HISTORY OF PRESENT ILLNESS
[FreeTextEntry1] : 78-year-old female Generally well-controlled reflux, pantoprazole, famotidine Hiatus hernia on most recent upper endoscopy January 2023 Worsening of chronic constipation Thin stools Normal colonoscopy August 2023  Social history: Retired dentist, physician

## 2024-09-04 ENCOUNTER — APPOINTMENT (OUTPATIENT)
Dept: OTOLARYNGOLOGY | Facility: CLINIC | Age: 79
End: 2024-09-04

## 2024-09-04 PROCEDURE — 99213 OFFICE O/P EST LOW 20 MIN: CPT | Mod: 25

## 2024-09-04 PROCEDURE — 31573 LARGSC W/THER INJECTION: CPT | Mod: 50

## 2024-09-04 NOTE — HISTORY OF PRESENT ILLNESS
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 History of tracheobronchomalacia, Nunez's esophagus. s/p bronchoplasty/tracheoplasty (Dr. Matt Ramirez in 2017). 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 Last SLN injection 2/21/24 and 4/3/24, coughing improved following injections. Reports increase in coughing over the past few weeks. Rarely productive. Continues on Pantoprazole daily and Famotidine at night. Occasional voice hoarseness.  Would like to discuss additional injection.

## 2024-09-04 NOTE — CONSULT LETTER
[Dear  ___] : Dear  [unfilled], [Consult Letter:] : I had the pleasure of evaluating your patient, [unfilled]. [Please see my note below.] : Please see my note below. [Consult Closing:] : Thank you very much for allowing me to participate in the care of this patient.  If you have any questions, please do not hesitate to contact me. [Sincerely,] : Sincerely, [FreeTextEntry2] : Dr. Henry Christopher [FreeTextEntry3] : Chao Mcneil MD, PhD Chief, Division of Laryngology Department of Otolaryngology [facility] Pediatric Otolaryngology, [facility]  of Otolaryngology [facility]

## 2024-09-04 NOTE — PHYSICAL EXAM
Work on ROM as discussed.  Light use with right arm is ok.  Follow up in 3 weeks for recheck.   [Midline] : trachea located in midline position [Normal] : no rashes

## 2024-11-15 ENCOUNTER — APPOINTMENT (OUTPATIENT)
Dept: PULMONOLOGY | Facility: CLINIC | Age: 79
End: 2024-11-15
Payer: MEDICARE

## 2024-11-15 VITALS
OXYGEN SATURATION: 97 % | TEMPERATURE: 97.6 F | BODY MASS INDEX: 26.06 KG/M2 | HEIGHT: 61 IN | SYSTOLIC BLOOD PRESSURE: 140 MMHG | HEART RATE: 74 BPM | RESPIRATION RATE: 16 BRPM | DIASTOLIC BLOOD PRESSURE: 70 MMHG | WEIGHT: 138 LBS

## 2024-11-15 DIAGNOSIS — J82.83 EOSINOPHILIC ASTHMA: ICD-10-CM

## 2024-11-15 DIAGNOSIS — K21.9 GASTRO-ESOPHAGEAL REFLUX DISEASE W/OUT ESOPHAGITIS: ICD-10-CM

## 2024-11-15 DIAGNOSIS — R93.89 ABNORMAL FINDINGS ON DIAGNOSTIC IMAGING OF OTHER SPECIFIED BODY STRUCTURES: ICD-10-CM

## 2024-11-15 DIAGNOSIS — J39.8 OTHER SPECIFIED DISEASES OF UPPER RESPIRATORY TRACT: ICD-10-CM

## 2024-11-15 DIAGNOSIS — J45.50 SEVERE PERSISTENT ASTHMA, UNCOMPLICATED: ICD-10-CM

## 2024-11-15 DIAGNOSIS — R06.02 SHORTNESS OF BREATH: ICD-10-CM

## 2024-11-15 PROCEDURE — 99214 OFFICE O/P EST MOD 30 MIN: CPT | Mod: 25

## 2024-11-15 PROCEDURE — 95012 NITRIC OXIDE EXP GAS DETER: CPT

## 2024-11-15 PROCEDURE — 94010 BREATHING CAPACITY TEST: CPT

## 2024-12-18 ENCOUNTER — APPOINTMENT (OUTPATIENT)
Dept: OTOLARYNGOLOGY | Facility: CLINIC | Age: 79
End: 2024-12-18
Payer: MEDICARE

## 2024-12-18 PROCEDURE — 99214 OFFICE O/P EST MOD 30 MIN: CPT | Mod: 25

## 2024-12-18 PROCEDURE — 31573 LARGSC W/THER INJECTION: CPT | Mod: 50

## 2024-12-18 RX ORDER — AZITHROMYCIN 250 MG/1
250 TABLET, FILM COATED ORAL
Qty: 1 | Refills: 1 | Status: ACTIVE | COMMUNITY
Start: 2024-12-18 | End: 1900-01-01

## 2025-01-30 ENCOUNTER — NON-APPOINTMENT (OUTPATIENT)
Age: 80
End: 2025-01-30

## 2025-01-30 ENCOUNTER — APPOINTMENT (OUTPATIENT)
Dept: GASTROENTEROLOGY | Facility: CLINIC | Age: 80
End: 2025-01-30
Payer: MEDICARE

## 2025-01-30 VITALS
SYSTOLIC BLOOD PRESSURE: 140 MMHG | HEIGHT: 63 IN | HEART RATE: 85 BPM | OXYGEN SATURATION: 96 % | RESPIRATION RATE: 16 BRPM | DIASTOLIC BLOOD PRESSURE: 80 MMHG | BODY MASS INDEX: 24.8 KG/M2 | WEIGHT: 140 LBS

## 2025-01-30 DIAGNOSIS — K21.9 GASTRO-ESOPHAGEAL REFLUX DISEASE W/OUT ESOPHAGITIS: ICD-10-CM

## 2025-01-30 PROCEDURE — 99214 OFFICE O/P EST MOD 30 MIN: CPT

## 2025-01-30 PROCEDURE — G2211 COMPLEX E/M VISIT ADD ON: CPT

## 2025-01-30 RX ORDER — SUCRALFATE 1 G/10ML
1 SUSPENSION ORAL
Qty: 1 | Refills: 2 | Status: ACTIVE | COMMUNITY
Start: 2025-01-30 | End: 1900-01-01

## 2025-02-05 ENCOUNTER — APPOINTMENT (OUTPATIENT)
Dept: OTOLARYNGOLOGY | Facility: CLINIC | Age: 80
End: 2025-02-05

## 2025-02-05 VITALS — DIASTOLIC BLOOD PRESSURE: 76 MMHG | SYSTOLIC BLOOD PRESSURE: 144 MMHG | HEART RATE: 87 BPM | OXYGEN SATURATION: 98 %

## 2025-02-05 PROCEDURE — 31579 LARYNGOSCOPY TELESCOPIC: CPT

## 2025-02-05 PROCEDURE — 99214 OFFICE O/P EST MOD 30 MIN: CPT | Mod: 25

## 2025-02-05 RX ORDER — PANTOPRAZOLE 40 MG/1
40 TABLET, DELAYED RELEASE ORAL
Qty: 180 | Refills: 0 | Status: ACTIVE | COMMUNITY
Start: 2025-02-05 | End: 1900-01-01

## 2025-02-25 ENCOUNTER — APPOINTMENT (OUTPATIENT)
Dept: OTOLARYNGOLOGY | Facility: CLINIC | Age: 80
End: 2025-02-25
Payer: MEDICARE

## 2025-02-25 PROCEDURE — 99214 OFFICE O/P EST MOD 30 MIN: CPT | Mod: 2W

## 2025-03-24 ENCOUNTER — APPOINTMENT (OUTPATIENT)
Dept: RADIOLOGY | Facility: HOSPITAL | Age: 80
End: 2025-03-24
Payer: MEDICARE

## 2025-03-24 ENCOUNTER — OUTPATIENT (OUTPATIENT)
Dept: OUTPATIENT SERVICES | Facility: HOSPITAL | Age: 80
LOS: 1 days | End: 2025-03-24

## 2025-03-24 DIAGNOSIS — Z98.41 CATARACT EXTRACTION STATUS, RIGHT EYE: Chronic | ICD-10-CM

## 2025-03-24 DIAGNOSIS — Z98.890 OTHER SPECIFIED POSTPROCEDURAL STATES: Chronic | ICD-10-CM

## 2025-03-24 DIAGNOSIS — Z90.49 ACQUIRED ABSENCE OF OTHER SPECIFIED PARTS OF DIGESTIVE TRACT: Chronic | ICD-10-CM

## 2025-03-24 DIAGNOSIS — K21.9 GASTRO-ESOPHAGEAL REFLUX DISEASE WITHOUT ESOPHAGITIS: ICD-10-CM

## 2025-03-24 PROCEDURE — 74220 X-RAY XM ESOPHAGUS 1CNTRST: CPT | Mod: 26

## 2025-04-02 RX ORDER — PREDNISONE 10 MG/1
10 TABLET ORAL
Qty: 21 | Refills: 0 | Status: ACTIVE | COMMUNITY
Start: 2025-04-02 | End: 1900-01-01

## 2025-04-02 RX ORDER — PROMETHAZINE HYDROCHLORIDE AND DEXTROMETHORPHAN HYDROBROMIDE ORAL SOLUTION 15; 6.25 MG/5ML; MG/5ML
6.25-15 SOLUTION ORAL
Qty: 480 | Refills: 1 | Status: ACTIVE | COMMUNITY
Start: 2025-04-02 | End: 1900-01-01

## 2025-05-13 ENCOUNTER — APPOINTMENT (OUTPATIENT)
Dept: OTOLARYNGOLOGY | Facility: CLINIC | Age: 80
End: 2025-05-13

## 2025-05-13 VITALS — SYSTOLIC BLOOD PRESSURE: 152 MMHG | DIASTOLIC BLOOD PRESSURE: 79 MMHG | OXYGEN SATURATION: 96 % | HEART RATE: 75 BPM

## 2025-05-13 PROCEDURE — 31573 LARGSC W/THER INJECTION: CPT | Mod: 50

## 2025-05-13 PROCEDURE — 99214 OFFICE O/P EST MOD 30 MIN: CPT | Mod: 25

## 2025-05-14 ENCOUNTER — APPOINTMENT (OUTPATIENT)
Dept: PULMONOLOGY | Facility: CLINIC | Age: 80
End: 2025-05-14
Payer: MEDICARE

## 2025-05-14 VITALS
TEMPERATURE: 97.3 F | OXYGEN SATURATION: 98 % | DIASTOLIC BLOOD PRESSURE: 64 MMHG | BODY MASS INDEX: 24.27 KG/M2 | RESPIRATION RATE: 16 BRPM | SYSTOLIC BLOOD PRESSURE: 130 MMHG | HEART RATE: 71 BPM | WEIGHT: 137 LBS | HEIGHT: 63 IN

## 2025-05-14 DIAGNOSIS — G47.33 OBSTRUCTIVE SLEEP APNEA (ADULT) (PEDIATRIC): ICD-10-CM

## 2025-05-14 DIAGNOSIS — J45.909 UNSPECIFIED ASTHMA, UNCOMPLICATED: ICD-10-CM

## 2025-05-14 DIAGNOSIS — K21.9 GASTRO-ESOPHAGEAL REFLUX DISEASE W/OUT ESOPHAGITIS: ICD-10-CM

## 2025-05-14 DIAGNOSIS — R93.89 ABNORMAL FINDINGS ON DIAGNOSTIC IMAGING OF OTHER SPECIFIED BODY STRUCTURES: ICD-10-CM

## 2025-05-14 DIAGNOSIS — J45.50 SEVERE PERSISTENT ASTHMA, UNCOMPLICATED: ICD-10-CM

## 2025-05-14 PROCEDURE — 99214 OFFICE O/P EST MOD 30 MIN: CPT | Mod: 25

## 2025-05-14 PROCEDURE — 95012 NITRIC OXIDE EXP GAS DETER: CPT

## 2025-05-22 ENCOUNTER — TRANSCRIPTION ENCOUNTER (OUTPATIENT)
Age: 80
End: 2025-05-22

## 2025-08-12 ENCOUNTER — APPOINTMENT (OUTPATIENT)
Dept: OTOLARYNGOLOGY | Facility: CLINIC | Age: 80
End: 2025-08-12
Payer: MEDICARE

## 2025-08-12 VITALS — HEART RATE: 77 BPM | SYSTOLIC BLOOD PRESSURE: 152 MMHG | DIASTOLIC BLOOD PRESSURE: 77 MMHG | OXYGEN SATURATION: 98 %

## 2025-08-12 PROCEDURE — 31579 LARYNGOSCOPY TELESCOPIC: CPT

## 2025-08-12 PROCEDURE — 99214 OFFICE O/P EST MOD 30 MIN: CPT | Mod: 25

## 2025-09-09 ENCOUNTER — APPOINTMENT (OUTPATIENT)
Dept: ULTRASOUND IMAGING | Facility: CLINIC | Age: 80
End: 2025-09-09

## 2025-09-10 ENCOUNTER — APPOINTMENT (OUTPATIENT)
Dept: ENDOCRINOLOGY | Facility: CLINIC | Age: 80
End: 2025-09-10

## 2025-09-10 ENCOUNTER — APPOINTMENT (OUTPATIENT)
Dept: PULMONOLOGY | Facility: CLINIC | Age: 80
End: 2025-09-10
Payer: MEDICARE

## 2025-09-10 VITALS
RESPIRATION RATE: 17 BRPM | HEART RATE: 82 BPM | WEIGHT: 137 LBS | DIASTOLIC BLOOD PRESSURE: 64 MMHG | HEIGHT: 61 IN | OXYGEN SATURATION: 95 % | BODY MASS INDEX: 25.86 KG/M2 | SYSTOLIC BLOOD PRESSURE: 132 MMHG | TEMPERATURE: 97.4 F

## 2025-09-10 VITALS
WEIGHT: 137 LBS | HEIGHT: 61 IN | OXYGEN SATURATION: 95 % | DIASTOLIC BLOOD PRESSURE: 70 MMHG | HEART RATE: 80 BPM | SYSTOLIC BLOOD PRESSURE: 133 MMHG | BODY MASS INDEX: 25.86 KG/M2 | RESPIRATION RATE: 16 BRPM

## 2025-09-10 DIAGNOSIS — R06.02 SHORTNESS OF BREATH: ICD-10-CM

## 2025-09-10 DIAGNOSIS — G47.33 OBSTRUCTIVE SLEEP APNEA (ADULT) (PEDIATRIC): ICD-10-CM

## 2025-09-10 DIAGNOSIS — J45.909 UNSPECIFIED ASTHMA, UNCOMPLICATED: ICD-10-CM

## 2025-09-10 DIAGNOSIS — R93.89 ABNORMAL FINDINGS ON DIAGNOSTIC IMAGING OF OTHER SPECIFIED BODY STRUCTURES: ICD-10-CM

## 2025-09-10 DIAGNOSIS — K21.9 GASTRO-ESOPHAGEAL REFLUX DISEASE W/OUT ESOPHAGITIS: ICD-10-CM

## 2025-09-10 DIAGNOSIS — J45.50 SEVERE PERSISTENT ASTHMA, UNCOMPLICATED: ICD-10-CM

## 2025-09-10 PROCEDURE — ZZZZZ: CPT

## 2025-09-10 PROCEDURE — 94010 BREATHING CAPACITY TEST: CPT

## 2025-09-10 PROCEDURE — 95012 NITRIC OXIDE EXP GAS DETER: CPT

## 2025-09-10 PROCEDURE — 99214 OFFICE O/P EST MOD 30 MIN: CPT | Mod: 25

## 2025-09-15 ENCOUNTER — APPOINTMENT (OUTPATIENT)
Dept: ENDOCRINOLOGY | Facility: CLINIC | Age: 80
End: 2025-09-15

## 2025-09-15 ENCOUNTER — LABORATORY RESULT (OUTPATIENT)
Age: 80
End: 2025-09-15

## 2025-09-16 ENCOUNTER — APPOINTMENT (OUTPATIENT)
Dept: ENDOCRINOLOGY | Facility: CLINIC | Age: 80
End: 2025-09-16
Payer: MEDICARE

## 2025-09-16 DIAGNOSIS — R42 DIZZINESS AND GIDDINESS: ICD-10-CM

## 2025-09-16 PROCEDURE — 96372 THER/PROPH/DIAG INJ SC/IM: CPT

## 2025-09-16 RX ORDER — LANCETS
EACH MISCELLANEOUS
Qty: 1 | Refills: 2 | Status: ACTIVE | COMMUNITY
Start: 2025-09-16 | End: 1900-01-01

## 2025-09-16 RX ORDER — ISOPROPYL ALCOHOL 0.7 ML/ML
SWAB TOPICAL
Qty: 1 | Refills: 0 | Status: ACTIVE | COMMUNITY
Start: 2025-09-16 | End: 1900-01-01

## 2025-09-16 RX ORDER — BLOOD-GLUCOSE METER
W/DEVICE KIT MISCELLANEOUS
Qty: 1 | Refills: 0 | Status: ACTIVE | COMMUNITY
Start: 2025-09-16 | End: 1900-01-01

## 2025-09-16 RX ORDER — COSYNTROPIN 0.25 MG/ML
0.25 INJECTION, POWDER, LYOPHILIZED, FOR SOLUTION INTRAVENOUS
Qty: 0 | Refills: 0 | Status: COMPLETED | OUTPATIENT
Start: 2025-09-15

## 2025-09-17 LAB
25(OH)D3 SERPL-MCNC: 89.2 NG/ML
ACTH STIM CORTISOL 30 MIN: 23.2 UG/DL
ACTH STIM CORTISOL BASELINE: 7.1 UG/DL
ACTH STIMULATION : CORTISOL 60 MIN: 28.8 UG/DL
ESTIMATED AVERAGE GLUCOSE: 128 MG/DL
HBA1C MFR BLD HPLC: 6.1 %
VIT B12 SERPL-MCNC: 1394 PG/ML

## 2025-09-18 ENCOUNTER — APPOINTMENT (OUTPATIENT)
Dept: GASTROENTEROLOGY | Facility: CLINIC | Age: 80
End: 2025-09-18
Payer: MEDICARE

## 2025-09-18 VITALS
HEART RATE: 86 BPM | BODY MASS INDEX: 25.86 KG/M2 | RESPIRATION RATE: 16 BRPM | HEIGHT: 61 IN | WEIGHT: 137 LBS | DIASTOLIC BLOOD PRESSURE: 76 MMHG | OXYGEN SATURATION: 95 % | SYSTOLIC BLOOD PRESSURE: 139 MMHG

## 2025-09-18 DIAGNOSIS — R10.9 UNSPECIFIED ABDOMINAL PAIN: ICD-10-CM

## 2025-09-18 DIAGNOSIS — R14.0 ABDOMINAL DISTENSION (GASEOUS): ICD-10-CM

## 2025-09-18 DIAGNOSIS — K21.9 GASTRO-ESOPHAGEAL REFLUX DISEASE W/OUT ESOPHAGITIS: ICD-10-CM

## 2025-09-18 PROCEDURE — G2211 COMPLEX E/M VISIT ADD ON: CPT

## 2025-09-18 PROCEDURE — 99213 OFFICE O/P EST LOW 20 MIN: CPT

## 2025-09-19 ENCOUNTER — APPOINTMENT (OUTPATIENT)
Dept: ELECTROPHYSIOLOGY | Facility: CLINIC | Age: 80
End: 2025-09-19

## 2025-09-19 ENCOUNTER — APPOINTMENT (OUTPATIENT)
Dept: CARDIOLOGY | Facility: CLINIC | Age: 80
End: 2025-09-19

## 2025-09-19 VITALS
DIASTOLIC BLOOD PRESSURE: 78 MMHG | HEIGHT: 61 IN | OXYGEN SATURATION: 97 % | HEART RATE: 71 BPM | WEIGHT: 137 LBS | SYSTOLIC BLOOD PRESSURE: 158 MMHG | BODY MASS INDEX: 25.86 KG/M2

## (undated) DEVICE — TUBING IV SET GRAVITY 3Y 100" MACRO

## (undated) DEVICE — PACK IV START WITH CHG

## (undated) DEVICE — SENSOR O2 FINGER ADULT

## (undated) DEVICE — BIOPSY FORCEP RADIAL JAW 4 STANDARD WITH NEEDLE

## (undated) DEVICE — SYR LUER LOK 50CC

## (undated) DEVICE — BALLOON US ENDO

## (undated) DEVICE — POLY TRAP ETRAP

## (undated) DEVICE — COLONOSCOPE 2416901: Type: DURABLE MEDICAL EQUIPMENT

## (undated) DEVICE — SUCTION YANKAUER NO CONTROL VENT

## (undated) DEVICE — SYR ALLIANCE II INFLATION 60ML

## (undated) DEVICE — ELCTR GROUNDING PAD ADULT COVIDIEN

## (undated) DEVICE — TUBING SUCTION CONN 6FT STERILE

## (undated) DEVICE — TUBING SUCTION 20FT

## (undated) DEVICE — FOLEY HOLDER STATLOCK 2 WAY ADULT

## (undated) DEVICE — FORCEP RADIAL JAW 4 JUMBO 2.8MM 3.2MM 240CM ORANGE DISP

## (undated) DEVICE — CLAMP BX HOT RAD JAW 3

## (undated) DEVICE — CATH IV SAFE BC 22G X 1" (BLUE)

## (undated) DEVICE — CATH IV SAFE BC 20G X 1.16" (PINK)

## (undated) DEVICE — SOL INJ NS 0.9% 500ML 2 PORT

## (undated) DEVICE — BITE BLOCK ADULT 20 X 27MM (GREEN)

## (undated) DEVICE — IRRIGATOR BIO SHIELD

## (undated) DEVICE — BRUSH COLONOSCOPY CYTOLOGY